# Patient Record
Sex: FEMALE | Race: ASIAN | NOT HISPANIC OR LATINO | Employment: OTHER | URBAN - METROPOLITAN AREA
[De-identification: names, ages, dates, MRNs, and addresses within clinical notes are randomized per-mention and may not be internally consistent; named-entity substitution may affect disease eponyms.]

---

## 2018-04-10 ENCOUNTER — TRANSFERRED RECORDS (OUTPATIENT)
Dept: HEALTH INFORMATION MANAGEMENT | Facility: CLINIC | Age: 73
End: 2018-04-10

## 2018-08-04 ENCOUNTER — TRANSFERRED RECORDS (OUTPATIENT)
Dept: HEALTH INFORMATION MANAGEMENT | Facility: CLINIC | Age: 73
End: 2018-08-04

## 2018-09-13 ENCOUNTER — TRANSFERRED RECORDS (OUTPATIENT)
Dept: HEALTH INFORMATION MANAGEMENT | Facility: CLINIC | Age: 73
End: 2018-09-13

## 2018-11-14 LAB — MAMMOGRAM: NORMAL

## 2018-11-28 ENCOUNTER — TRANSFERRED RECORDS (OUTPATIENT)
Dept: HEALTH INFORMATION MANAGEMENT | Facility: CLINIC | Age: 73
End: 2018-11-28

## 2018-12-10 ENCOUNTER — TRANSFERRED RECORDS (OUTPATIENT)
Dept: HEALTH INFORMATION MANAGEMENT | Facility: CLINIC | Age: 73
End: 2018-12-10

## 2018-12-10 LAB — TSH SERPL-ACNC: 1.39 UIU/ML (ref 0.35–4.94)

## 2019-03-11 ENCOUNTER — DOCUMENTATION ONLY (OUTPATIENT)
Dept: CARE COORDINATION | Facility: CLINIC | Age: 74
End: 2019-03-11

## 2019-03-29 ENCOUNTER — OFFICE VISIT (OUTPATIENT)
Dept: DERMATOLOGY | Facility: CLINIC | Age: 74
End: 2019-03-29
Payer: COMMERCIAL

## 2019-03-29 VITALS — SYSTOLIC BLOOD PRESSURE: 145 MMHG | HEART RATE: 72 BPM | DIASTOLIC BLOOD PRESSURE: 78 MMHG

## 2019-03-29 DIAGNOSIS — L65.0 TELOGEN EFFLUVIUM: ICD-10-CM

## 2019-03-29 DIAGNOSIS — L64.9 ANDROGENETIC ALOPECIA: Primary | ICD-10-CM

## 2019-03-29 ASSESSMENT — PAIN SCALES - GENERAL: PAINLEVEL: NO PAIN (0)

## 2019-03-29 NOTE — LETTER
3/29/2019       RE: Leonardo De La Rosa  [address] [facility] [address]     Dear Colleague,    Thank you for referring your patient, Leonardo De La Rosa, to the [facility] DERMATOLOGY at [facility]. Please see a copy of my visit note below.    [facility] Dermatology Note      Dermatology Problem List:  1. 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 Cazares in 12/2018; report requested  -topical minoxidil 5% foam    2. Skin dyspigmentation, central forehead and chin  -strict photoprotection  -has hydroquinone at home, future: Triluma    Encounter Date: Mar 29, 2019    CC:   Chief Complaint   Patient presents with     Derm Problem     Leonardo is here for hairloss in the last 6 months and pigmentation on her face.      History of Present Illness:  Ms. Leonardo De La Rosa is a 73 year old female who presents in self referral for hair loss. She states she noticed rapid and diffuse loss of hair over the crown of the scalp (no where else on her body) ~6 months ago. She denies any recent stressors (3-6 months prior to shedding) including major life events, surgeries, anesthesia, rapid weight loss. She also has not noticed any symptoms on the scalp including burning, pain, or dysesthesia or redness or significant scale. The patient denies a personal history of Sjogren's syndrome, systemic or cutaneous lupus erythematosus, rheumatoid arthritis, vitiligo, inflammatory bowel disease, thyroid disease, dermatomyositis, and celiac disease.     The patient saw Dr. Cazares in 12/2018 and had labs and  a biopsy performed.  The following labs were unremarkable in 12/2018: vitamin B12/folate, ferritin, zinc, TSH/free T4, vitamin D, EVA, DHEA-S, testosterone, androstenedione, and SHBG. Unfortunately, we do not have copy of the office visit or results of the biopsy. Mediation review is significant for starting venlafaxine last spring and notes her hair loss may have started around this time.     For the dyspigmentation of the central forehead, she states she has had this for several years. Always in fixed areas (she notices this in the central forehead and chin). She wears sunscreen daily. She did notice this around the time she was taking oral contraceptives. She has not noted other areas of involvement. She does have some topical hydroquinone that she is not currently using, but it has helped in the past.       Past Medical History:   Patient Active Problem List   Diagnosis     Pain     Postsurgical arthrodesis status     Past Medical History:   Diagnosis Date     Arthritis     Feet Hands and back.     PONV (postoperative nausea and vomiting)     Severe     Past Surgical History:   Procedure Laterality Date     ARTHRODESIS FOOT  4/22/2013    Procedure: ARTHRODESIS FOOT;  Left Second and Third Tarsal metatarsal Arthrodesis, Bunion Correction, Lapidus Procedure, Weil Osteotomy   ;  Surgeon: Alber Pace MD;  Location: [facility] OR     BUNIONECTOMY LAPIDUS WITH TARSAL METATARSAL (TMT) FUSION  4/22/2013    Procedure: BUNIONECTOMY LAPIDUS WITH TARSAL METATARSAL (TMT) FUSION;;  Surgeon: Alber Pace MD;  Location: [facility] OR     C TOTAL KNEE ARTHROPLASTY      left     FOOT SURGERY      right bunionectomy.     HYSTERECTOMY TOTAL ABDOMINAL, BILATERAL SALPINGO-OOPHORECTOMY, COMBINED         Social History:  Patient does not have a smoking history on file. she has never used smokeless tobacco. She reports that she does not drink alcohol or use drugs.    Family History:  History reviewed. No pertinent family  history.    Medications:  Current Outpatient Medications   Medication Sig Dispense Refill     acetaminophen (TYLENOL) 325 MG tablet Take 3 tablets by mouth every 8 hours. 100 tablet 0     ASPIRIN EC PO Take 81 mg by mouth daily.       Atorvastatin Calcium (LIPITOR PO) Take 10 mg by mouth daily.       calcium carbonate-vitamin D (CALCIUM + D) 600-200 MG-UNIT TABS Take 1 tablet by mouth daily.       Celecoxib (CELEBREX PO) Take 200 mg by mouth daily.       Cholecalciferol (VITAMIN D3 PO) Take 1,000 Units by mouth daily.       glucosamine-chondroitin 500-400 MG CAPS Take 1 capsule by mouth daily.       HYDROmorphone (DILAUDID) 2 MG tablet Take 1 tablet by mouth every 3 hours as needed. 40 tablet 0     hydrOXYzine (ATARAX) 10 MG tablet Take 1-2 tablets by mouth every 6 hours. 50 tablet 1     multivitamin, therapeutic with minerals (MULTI-VITAMIN) TABS Take 1 tablet by mouth daily.       Omega-3 Fatty Acids (OMEGA-3 FISH OIL PO) Take 1,200 g by mouth daily.       ondansetron (ZOFRAN-ODT) 4 MG disintegrating tablet Take 1 tablet by mouth every 6 hours as needed for nausea. 30 tablet 1     senna-docusate (SENOKOT-S;PERICOLACE) 8.6-50 MG per tablet Take 1-2 tablets by mouth 2 times daily. 14 tablet 0        Allergies   Allergen Reactions     Reglan      Review of Systems:  -As per HPI  -Constitutional: Otherwise feeling well today, in usual state of health.  -Skin: As above in HPI. No additional skin concerns.    Physical exam:  Vitals: /78   Pulse 72   GEN: This is a well developed, well-nourished female in no acute distress, in a pleasant mood.    SKIN: Examined face, scalp, neck, upper chest, upper back, arms, hands including nails/digits  -decreased density of follicles, primarily along the crown of the scalp with some miniaturization  -no macular erythema, perifollicular erythema  -subtle perifollicular accentuation   -there is no loss of follicular ostia in areas of hair loss  -normal density of eyebrows and  eyelashes  -poorly demarcated hyperpigmentation over central forehead  -No other lesions of concern on areas examined.     Impression/Plan:  1. Hair loss, appears nonscarring on clinical/trichoscopic exam with some miniaturization of follicles that is not prominent. Given the acute onset of hair loss, we would favor telogen effluvium, but no clear inciting factors. Of note, patient started venlafaxine shortly before hair loss started; this medication has been associated with hair loss, though this is not a common side effect. Recommend patient discuss an alternative antidepressant/antineuropathic agent with PCP in case this is contributory. For component of androgenetic alopecia, will start minoxidil 5% foam. Will obtain recent biopsy report and labs as well.    Recommended starting topical 5% minoxidil foam    Will have the patient sign a release of information to obtain biopsy results +/- office visit with Dr. Cazares    2. Dyspigmentation of central forehead: quite subtle on exam today, possibly consistent with melasma vs lichen planus pigmentosus, partially treated with hydroquinone. Discussed treatment options of topical agents, including risks/benefits, and recommend strict photoprotection and hydroquinone.    Can consider topical Triluma in the future if recalcitrant.     Follow-up in 2 months, earlier for new or changing lesions.     Dr. Zimmerman staffed the patient.    Staff Involved:  Resident(Amira Jung)/Staff    Staff Physician Comments:   I saw and evaluated the patient with the resident and I edited the assessment and plan as documented in the note. I was present for the examination.    Yves Zimmerman MD

## 2019-03-29 NOTE — PROGRESS NOTES
[facility] Dermatology Note      Dermatology Problem List:  1. 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 Cazares in 12/2018; report requested  -topical minoxidil 5% foam    2. Skin dyspigmentation, central forehead and chin  -strict photoprotection  -has hydroquinone at home, future: Triluma    Encounter Date: Mar 29, 2019    CC:   Chief Complaint   Patient presents with     Derm Problem     Leonardo is here for hairloss in the last 6 months and pigmentation on her face.      History of Present Illness:  Ms. Leonardo De La Rosa is a 73 year old female who presents in self referral for hair loss. She states she noticed rapid and diffuse loss of hair over the crown of the scalp (no where else on her body) ~6 months ago. She denies any recent stressors (3-6 months prior to shedding) including major life events, surgeries, anesthesia, rapid weight loss. She also has not noticed any symptoms on the scalp including burning, pain, or dysesthesia or redness or significant scale. The patient denies a personal history of Sjogren's syndrome, systemic or cutaneous lupus erythematosus, rheumatoid arthritis, vitiligo, inflammatory bowel disease, thyroid disease, dermatomyositis, and celiac disease.     The patient saw Dr. Cazares in 12/2018 and had labs and a biopsy performed.  The following labs were unremarkable in 12/2018: vitamin B12/folate, ferritin, zinc, TSH/free T4, vitamin D, EVA, DHEA-S, testosterone, androstenedione, and SHBG. Unfortunately, we do not have copy of the office visit or results of the biopsy. Mediation  review is significant for starting venlafaxine last spring and notes her hair loss may have started around this time.     For the dyspigmentation of the central forehead, she states she has had this for several years. Always in fixed areas (she notices this in the central forehead and chin). She wears sunscreen daily. She did notice this around the time she was taking oral contraceptives. She has not noted other areas of involvement. She does have some topical hydroquinone that she is not currently using, but it has helped in the past.       Past Medical History:   Patient Active Problem List   Diagnosis     Pain     Postsurgical arthrodesis status     Past Medical History:   Diagnosis Date     Arthritis     Feet Hands and back.     PONV (postoperative nausea and vomiting)     Severe     Past Surgical History:   Procedure Laterality Date     ARTHRODESIS FOOT  4/22/2013    Procedure: ARTHRODESIS FOOT;  Left Second and Third Tarsal metatarsal Arthrodesis, Bunion Correction, Lapidus Procedure, Weil Osteotomy   ;  Surgeon: Alber Pace MD;  Location: [facility] OR     BUNIONECTOMY LAPIDUS WITH TARSAL METATARSAL (TMT) FUSION  4/22/2013    Procedure: BUNIONECTOMY LAPIDUS WITH TARSAL METATARSAL (TMT) FUSION;;  Surgeon: Alber Pace MD;  Location: [facility] OR     C TOTAL KNEE ARTHROPLASTY      left     FOOT SURGERY      right bunionectomy.     HYSTERECTOMY TOTAL ABDOMINAL, BILATERAL SALPINGO-OOPHORECTOMY, COMBINED         Social History:  Patient does not have a smoking history on file. she has never used smokeless tobacco. She reports that she does not drink alcohol or use drugs.    Family History:  History reviewed. No pertinent family history.    Medications:  Current Outpatient Medications   Medication Sig Dispense Refill     acetaminophen (TYLENOL) 325 MG tablet Take 3 tablets by mouth every 8 hours. 100 tablet 0     ASPIRIN EC PO Take 81 mg by mouth daily.       Atorvastatin Calcium (LIPITOR PO) Take 10 mg by  mouth daily.       calcium carbonate-vitamin D (CALCIUM + D) 600-200 MG-UNIT TABS Take 1 tablet by mouth daily.       Celecoxib (CELEBREX PO) Take 200 mg by mouth daily.       Cholecalciferol (VITAMIN D3 PO) Take 1,000 Units by mouth daily.       glucosamine-chondroitin 500-400 MG CAPS Take 1 capsule by mouth daily.       HYDROmorphone (DILAUDID) 2 MG tablet Take 1 tablet by mouth every 3 hours as needed. 40 tablet 0     hydrOXYzine (ATARAX) 10 MG tablet Take 1-2 tablets by mouth every 6 hours. 50 tablet 1     multivitamin, therapeutic with minerals (MULTI-VITAMIN) TABS Take 1 tablet by mouth daily.       Omega-3 Fatty Acids (OMEGA-3 FISH OIL PO) Take 1,200 g by mouth daily.       ondansetron (ZOFRAN-ODT) 4 MG disintegrating tablet Take 1 tablet by mouth every 6 hours as needed for nausea. 30 tablet 1     senna-docusate (SENOKOT-S;PERICOLACE) 8.6-50 MG per tablet Take 1-2 tablets by mouth 2 times daily. 14 tablet 0        Allergies   Allergen Reactions     Reglan      Review of Systems:  -As per HPI  -Constitutional: Otherwise feeling well today, in usual state of health.  -Skin: As above in HPI. No additional skin concerns.    Physical exam:  Vitals: /78   Pulse 72   GEN: This is a well developed, well-nourished female in no acute distress, in a pleasant mood.    SKIN: Examined face, scalp, neck, upper chest, upper back, arms, hands including nails/digits  -decreased density of follicles, primarily along the crown of the scalp with some miniaturization  -no macular erythema, perifollicular erythema  -subtle perifollicular accentuation   -there is no loss of follicular ostia in areas of hair loss  -normal density of eyebrows and eyelashes  -poorly demarcated hyperpigmentation over central forehead  -No other lesions of concern on areas examined.     Impression/Plan:  1. Hair loss, appears nonscarring on clinical/trichoscopic exam with some miniaturization of follicles that is not prominent. Given the acute  onset of hair loss, we would favor telogen effluvium, but no clear inciting factors. Of note, patient started venlafaxine shortly before hair loss started; this medication has been associated with hair loss, though this is not a common side effect. Recommend patient discuss an alternative antidepressant/antineuropathic agent with PCP in case this is contributory. For component of androgenetic alopecia, will start minoxidil 5% foam. Will obtain recent biopsy report and labs as well.    Recommended starting topical 5% minoxidil foam    Will have the patient sign a release of information to obtain biopsy results +/- office visit with Dr. Cazares    2. Dyspigmentation of central forehead: quite subtle on exam today, possibly consistent with melasma vs lichen planus pigmentosus, partially treated with hydroquinone. Discussed treatment options of topical agents, including risks/benefits, and recommend strict photoprotection and hydroquinone.    Can consider topical Triluma in the future if recalcitrant.     Follow-up in 2 months, earlier for new or changing lesions.     Dr. Zimmerman staffed the patient.    Staff Involved:  Resident(Amira Jung)/Staff    Staff Physician Comments:   I saw and evaluated the patient with the resident and I edited the assessment and plan as documented in the note. I was present for the examination.    Yves Zimmerman MD   of Dermatology  Department of Dermatology  [facility]

## 2019-03-29 NOTE — NURSING NOTE
Dermatology Rooming Note    Leonardo K Rj's goals for this visit include:   Chief Complaint   Patient presents with     Derm Problem     Leonardo is here for hairloss in the last 6 months and pigmentation on her face.      Jodi Guevara LPN

## 2019-04-03 ENCOUNTER — OFFICE VISIT (OUTPATIENT)
Dept: OTOLARYNGOLOGY | Facility: CLINIC | Age: 74
End: 2019-04-03
Payer: MEDICARE

## 2019-04-03 VITALS — SYSTOLIC BLOOD PRESSURE: 160 MMHG | DIASTOLIC BLOOD PRESSURE: 85 MMHG | HEART RATE: 75 BPM | OXYGEN SATURATION: 100 %

## 2019-04-03 DIAGNOSIS — J31.0 CHRONIC RHINITIS: Primary | ICD-10-CM

## 2019-04-03 DIAGNOSIS — R05.3 CHRONIC COUGH: ICD-10-CM

## 2019-04-03 DIAGNOSIS — J38.7 IRRITABLE LARYNX: ICD-10-CM

## 2019-04-03 PROCEDURE — 31575 DIAGNOSTIC LARYNGOSCOPY: CPT | Performed by: OTOLARYNGOLOGY

## 2019-04-03 PROCEDURE — 99203 OFFICE O/P NEW LOW 30 MIN: CPT | Mod: 25 | Performed by: OTOLARYNGOLOGY

## 2019-04-03 RX ORDER — VENLAFAXINE HYDROCHLORIDE 150 MG/1
150 CAPSULE, EXTENDED RELEASE ORAL DAILY
COMMUNITY
End: 2019-06-19

## 2019-04-03 ASSESSMENT — PAIN SCALES - GENERAL: PAINLEVEL: MODERATE PAIN (5)

## 2019-04-03 NOTE — PATIENT INSTRUCTIONS
Humidifier and possibly air filter in bedroom.    Use saline nasal spray frequently 2-8 times a day    Speech therapy for cough suppresion and muslce tension dysphonia    Continu anti reflux medications  GERD (Gastro Esophageal Reflux Disorder)  Other names    reflux     Laryngopharyngeal Reflux Disorder (LPRD)   Symptoms    dry, choking sensation, especially during the night     voice quality that is worst in the morning     raw, burning sensation in the throat     pain in throat, neck, or running from back of chin along neck     frequent coughing or desire to clear throat     rough, gritty voice quality     decline in voice quality or comfort with continued voice use     a sour taste in your mouth upon waking up       Interestingly, the majority of the patients in the [facility] who have laryngeal symptoms of GERD do not have any stomach discomfort or sensation of heartburn.   Cause  Acid from the stomach refluxes back up through the esophagus and spills over onto the larynx. This irritates the vocal folds (also called vocal cords; see the explanation of this terminology) and creates inflammation, which causes the vocal folds to vibrate unevenly. Coughing and throat clearing from the irritation can make the inflammation worse. The resulting voice disorder is often related to the poor vibratory quality of the inflamed vocal folds and the muscle tension created by effortful attempts at compensation.  Treatment  Anti-reflux medication and dietary precautions are the first line of defense. Functional voice therapy is useful to teach techniques for reducing effortful compensation and instruct the individual in improved vocal hygiene.  At the [facility], we do not hand out a list of foods and beverages that must be avoided. Rather, we educate about types of foods and beverages known to cause reflux and encourage the individual to systematically investigate which foods stimulate their own reflux. Also,  the individual is encouraged to manage reflux under the care of a Gastroenterologist (gastrointestinal specialist).  Interested readers are encouraged to look at the website of the [facility] at [facility] for a more in depth discussion of GERD and the voice (see our Links page).  Lifestyle changes that may help reduce symptoms of GERD/LPRD    eat smaller meals more frequently throughout the day, rather than three large meals     elevate the head of your bed 2-3 inches (don't just use extra pillows for your head)     avoid clothes that fit tightly around the waist     avoid lying down within 2-3 hours of eating (don't eat dinner late at night)   Types of foods known to trigger increased stomach acid    spicy food (such as chili or jalapeño peppers, Azeri or Szechuan spices)     acidic foods such as tomato products or citrus products     greasy foods     caffeine     alcohol     carbonation     roughage, such as popcorn and peanuts, or raw vegetables     dairy products     strong mint such as peppermint candies     chocolate     Reading this list might make you think you can only eat bread and oatmeal for the rest of your life. Fortunately, most individuals are not triggered by everything on this list. For example, for every person who is lactose intolerant and has problems with dairy products, there may be someone else whose digestive system is calmed by milk. Also, in our experience, few persons are triggered by peppermints or chocolate. Therefore, we recommend that you experiment with your own dietary habits, changing one food class at a time. Also, if you have recently started taking anti-reflux medications, you may want to wait for several months, to see how the medication works without any change in your dietary habits.

## 2019-04-03 NOTE — LETTER
4/3/2019         RE: Leonardo De La oRsa  [address] [facility] [address]        Dear Colleague,    Thank you for referring your patient, Leonardo De La Rosa, to the [facility]. Please see a copy of my visit note below.      HISTORY OF PRESENT ILLNESS: Leonardo De La Rosa is a 73 year old female with a history of chronic cough for the last 6 months.  On ENT examination and Staci she was found to have posterior commissure hypertrophy and otherwise normal laryngeal exam.  She had been placed on omeprazole 40 mg a day.  She was switched in September to Protonix 40 mg every day for 3 months.  Lifestyle modification modifications were also encouraged.  She was also treated for postnasal drainage as that could be contributing to her cough.  A sinus rinse twice a day along with continuing Flonase was also recommended.  She also has a history of right frontal sinusitis and bilateral sphenoid sinusitis with sinus ossification.  It was felt by the prior ENT that this was unlikely that her cough was due to this.  Speech therapy was considered for chronic cough as well.  She has tried antibiotics for her sinusitis which have not been effective for her.    She had a history of sinus surgery approximately 30 years ago per her report.  Findings on a CT scan report on 9/13/2018 o showed chronic appearing opacification within the right frontal sinus with a nuchal.  Ostial reaction and internal calcification in the right frontal sinus which is unchanged since August 2018.  I was able to see a copy of the scan it looks more like a hypoplastic right frontal sinus with a possible bony cyst or space.  There was also chronic opacification within the sphenoid sinuses bilaterally with bulky internal calcifications and mucosal periosteal reaction which are unchanged.  There is mild mucosal thickening within the right maxillary sinus.  The left frontal, ethmoid and maxillary sinuses are clear without evidence of mucoperiosteal  reaction.      Treatment for her chronic cough has also included a trial of antibiotics for which she was given doxycycline as she was intolerant of Augmentin due to diarrhea.  Unfortunately this did not help her cough.  She notes her cough occurs mostly in the evening when she lies down and can result in a coughing fit.      PAST MEDICAL HISTORY:   Past Medical History:   Diagnosis Date     Arthritis     Feet Hands and back.     PONV (postoperative nausea and vomiting)     Severe       PAST SURGICAL HISTORY:   Past Surgical History:   Procedure Laterality Date     ARTHRODESIS FOOT  2013    Procedure: ARTHRODESIS FOOT;  Left Second and Third Tarsal metatarsal Arthrodesis, Bunion Correction, Lapidus Procedure, Weil Osteotomy   ;  Surgeon: Alber Pace MD;  Location: [facility] OR     BUNIONECTOMY LAPIDUS WITH TARSAL METATARSAL (TMT) FUSION  2013    Procedure: BUNIONECTOMY LAPIDUS WITH TARSAL METATARSAL (TMT) FUSION;;  Surgeon: Alber Pace MD;  Location: [facility] OR     C TOTAL KNEE ARTHROPLASTY      left     FOOT SURGERY      right bunionectomy.     HYSTERECTOMY TOTAL ABDOMINAL, BILATERAL SALPINGO-OOPHORECTOMY, COMBINED       Family History      Medical History Relation Name Comments   Heart Disease Mother   cad     Relation Name Status Comments   Mother           SOCIAL HISTORY:   Social History     Tobacco Use     Smoking status: Not on file     Smokeless tobacco: Never Used   Substance Use Topics     Alcohol use: No       REVIEW OF SYSTEMS: Ten point review of systems was performed and is negative except for what is noted in the HPI and PMH.     ALLERGIES: Reglan    MEDICATIONS:   Current Outpatient Medications   Medication Sig Dispense Refill     acetaminophen (TYLENOL) 325 MG tablet Take 3 tablets by mouth every 8 hours. 100 tablet 0     ASPIRIN EC PO Take 81 mg by mouth daily.       Atorvastatin Calcium (LIPITOR PO) Take 10 mg by mouth daily.       calcium carbonate-vitamin D (CALCIUM  + D) 600-200 MG-UNIT TABS Take 1 tablet by mouth daily.       Celecoxib (CELEBREX PO) Take 200 mg by mouth daily.       Cholecalciferol (VITAMIN D3 PO) Take 1,000 Units by mouth daily.       glucosamine-chondroitin 500-400 MG CAPS Take 1 capsule by mouth daily.       HYDROmorphone (DILAUDID) 2 MG tablet Take 1 tablet by mouth every 3 hours as needed. 40 tablet 0     hydrOXYzine (ATARAX) 10 MG tablet Take 1-2 tablets by mouth every 6 hours. 50 tablet 1     multivitamin, therapeutic with minerals (MULTI-VITAMIN) TABS Take 1 tablet by mouth daily.       Omega-3 Fatty Acids (OMEGA-3 FISH OIL PO) Take 1,200 g by mouth daily.       ondansetron (ZOFRAN-ODT) 4 MG disintegrating tablet Take 1 tablet by mouth every 6 hours as needed for nausea. 30 tablet 1     senna-docusate (SENOKOT-S;PERICOLACE) 8.6-50 MG per tablet Take 1-2 tablets by mouth 2 times daily. 14 tablet 0       PHYSICAL EXAMINATION:  She  is awake, alert and in no apparent distress.    Her tympanic membranes are clear and intact bilaterally. External auditory canals are clear.  Nasal exam shows a mild septal deviation without obstruction.  Total septal perforation present.  The anterior aspect of the middle turbinate on the right side is largely gone.  Examination of the oral cavity shows no suspicious lesions.  There is symmetric movement of the tongue and soft palate.    The oropharynx is clear.  Her neck is supple without significant adenopathy.  Pulse is regular.  Upper airway is clear.  Cranial nerves II-XII are grossly intact.       PROCEDURE: A flexible laryngoscopy and nasal endoscopy  was performed.  Informed consent was obtained and a time out was performed. 3% lidocaine and 0.25% phenylephrine was sprayed into the nasal cavity and allowed 3 to 5 minutes for effect.  The scope was initially passed through the right nostril.  Extensive surgical changes are seen.  The majority of the inferior turbinate is absent although a posterior remnant is still in  place.  The ethmoid cells are largely open.  A sphenoidotomy is seen.  I was able to look superiorly at what appears to be the  the frontal sinus.  A septal perforation is seen.  On the left side the turbinates are intact.  No surgical changes are present on examination no intranasal polyps or masses are seen.  The airway is much narrower.     Examination of the hypopharynx showed the vocal folds to be mobile and meet in the midline.  No nodules, polyps or ulcerations are seen.  There is mild posterior commissure hypertrophy.  On connected speech there is contracture of the supraglottic larynx in the anterior posterior dimension.  Complete closure of the epiglottic arytenoid space is not seen.  The immediate subglottic area is clear as is the hypopharynx.           IMPRESSION/PLAN: She has irritable larynx with an associated chronic cough.  She does have extensive surgical changes of the nasal cavity resulting in a decrease of humidification and filtering effect.    I am recommending a humidifier for her bedroom as well as an air filter for her bedroom.  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  I want to continue her antireflux medications and did give her her an anti-reflux diet.  I advised her this will not immediately resolve her cough but hopefully if we can make her larynx less irritable this will help.  Because of the decreased filtering available for the right nostril this may be more prolonged process that we might otherwise see.  She does have some changes in her sinus particularly in the sphenoid sinuses and if this is not effective we might consider sphenoidotomies and clearing of the sphenoid sinuses on either side from possible fungal debris or osteoma.  For this I would consider consultation with  my rhinology colleagues for their evaluation.        Again, thank you for allowing me to participate in the care of your patient.         Sincerely,        Jeff Lyons MD

## 2019-04-03 NOTE — NURSING NOTE
Leonardo De La Rosa's goals for this visit include:   Chief Complaint   Patient presents with     Consult     Cough which is causing pt to loose sleep. Voice getting hoarse       She requests these members of her care team be copied on today's visit information: PCP    PCP: Nikolas Gudino    Referring Provider:  No referring provider defined for this encounter.    /85 (BP Location: Right arm, Patient Position: Chair, Cuff Size: Adult Regular)   Pulse 75   SpO2 100%     Do you need any medication refills at today's visit? None      Caren Barrientos, CMA

## 2019-04-03 NOTE — PROGRESS NOTES
HISTORY OF PRESENT ILLNESS: Leonardo De La Rosa is a 73 year old female with a history of chronic cough for the last 6 months.  On ENT examination and Staci she was found to have posterior commissure hypertrophy and otherwise normal laryngeal exam.  She had been placed on omeprazole 40 mg a day.  She was switched in September to Protonix 40 mg every day for 3 months.  Lifestyle modification modifications were also encouraged.  She was also treated for postnasal drainage as that could be contributing to her cough.  A sinus rinse twice a day along with continuing Flonase was also recommended.  She also has a history of right frontal sinusitis and bilateral sphenoid sinusitis with sinus ossification.  It was felt by the prior ENT that this was unlikely that her cough was due to this.  Speech therapy was considered for chronic cough as well.  She has tried antibiotics for her sinusitis which have not been effective for her.    She had a history of sinus surgery approximately 30 years ago per her report.  Findings on a CT scan report on 9/13/2018 o showed chronic appearing opacification within the right frontal sinus with a nuchal.  Ostial reaction and internal calcification in the right frontal sinus which is unchanged since August 2018.  I was able to see a copy of the scan it looks more like a hypoplastic right frontal sinus with a possible bony cyst or space.  There was also chronic opacification within the sphenoid sinuses bilaterally with bulky internal calcifications and mucosal periosteal reaction which are unchanged.  There is mild mucosal thickening within the right maxillary sinus.  The left frontal, ethmoid and maxillary sinuses are clear without evidence of mucoperiosteal reaction.      Treatment for her chronic cough has also included a trial of antibiotics for which she was given doxycycline as she was intolerant of Augmentin due to diarrhea.  Unfortunately this did not help her cough.  She notes her cough  occurs mostly in the evening when she lies down and can result in a coughing fit.      PAST MEDICAL HISTORY:   Past Medical History:   Diagnosis Date     Arthritis     Feet Hands and back.     PONV (postoperative nausea and vomiting)     Severe       PAST SURGICAL HISTORY:   Past Surgical History:   Procedure Laterality Date     ARTHRODESIS FOOT  2013    Procedure: ARTHRODESIS FOOT;  Left Second and Third Tarsal metatarsal Arthrodesis, Bunion Correction, Lapidus Procedure, Weil Osteotomy   ;  Surgeon: Alber Pace MD;  Location: [facility] OR     BUNIONECTOMY LAPIDUS WITH TARSAL METATARSAL (TMT) FUSION  2013    Procedure: BUNIONECTOMY LAPIDUS WITH TARSAL METATARSAL (TMT) FUSION;;  Surgeon: Alber Pace MD;  Location: [facility] OR     C TOTAL KNEE ARTHROPLASTY      left     FOOT SURGERY      right bunionectomy.     HYSTERECTOMY TOTAL ABDOMINAL, BILATERAL SALPINGO-OOPHORECTOMY, COMBINED       Family History      Medical History Relation Name Comments   Heart Disease Mother   cad     Relation Name Status Comments   Mother           SOCIAL HISTORY:   Social History     Tobacco Use     Smoking status: Not on file     Smokeless tobacco: Never Used   Substance Use Topics     Alcohol use: No       REVIEW OF SYSTEMS: Ten point review of systems was performed and is negative except for what is noted in the HPI and PMH.     ALLERGIES: Reglan    MEDICATIONS:   Current Outpatient Medications   Medication Sig Dispense Refill     acetaminophen (TYLENOL) 325 MG tablet Take 3 tablets by mouth every 8 hours. 100 tablet 0     ASPIRIN EC PO Take 81 mg by mouth daily.       Atorvastatin Calcium (LIPITOR PO) Take 10 mg by mouth daily.       calcium carbonate-vitamin D (CALCIUM + D) 600-200 MG-UNIT TABS Take 1 tablet by mouth daily.       Celecoxib (CELEBREX PO) Take 200 mg by mouth daily.       Cholecalciferol (VITAMIN D3 PO) Take 1,000 Units by mouth daily.       glucosamine-chondroitin 500-400 MG CAPS Take 1  capsule by mouth daily.       HYDROmorphone (DILAUDID) 2 MG tablet Take 1 tablet by mouth every 3 hours as needed. 40 tablet 0     hydrOXYzine (ATARAX) 10 MG tablet Take 1-2 tablets by mouth every 6 hours. 50 tablet 1     multivitamin, therapeutic with minerals (MULTI-VITAMIN) TABS Take 1 tablet by mouth daily.       Omega-3 Fatty Acids (OMEGA-3 FISH OIL PO) Take 1,200 g by mouth daily.       ondansetron (ZOFRAN-ODT) 4 MG disintegrating tablet Take 1 tablet by mouth every 6 hours as needed for nausea. 30 tablet 1     senna-docusate (SENOKOT-S;PERICOLACE) 8.6-50 MG per tablet Take 1-2 tablets by mouth 2 times daily. 14 tablet 0       PHYSICAL EXAMINATION:  She  is awake, alert and in no apparent distress.    Her tympanic membranes are clear and intact bilaterally. External auditory canals are clear.  Nasal exam shows a mild septal deviation without obstruction.  Total septal perforation present.  The anterior aspect of the middle turbinate on the right side is largely gone.  Examination of the oral cavity shows no suspicious lesions.  There is symmetric movement of the tongue and soft palate.    The oropharynx is clear.  Her neck is supple without significant adenopathy.  Pulse is regular.  Upper airway is clear.  Cranial nerves II-XII are grossly intact.       PROCEDURE: A flexible laryngoscopy and nasal endoscopy  was performed.  Informed consent was obtained and a time out was performed. 3% lidocaine and 0.25% phenylephrine was sprayed into the nasal cavity and allowed 3 to 5 minutes for effect.  The scope was initially passed through the right nostril.  Extensive surgical changes are seen.  The majority of the inferior turbinate is absent although a posterior remnant is still in place.  The ethmoid cells are largely open.  A sphenoidotomy is seen.  I was able to look superiorly at what appears to be the  the frontal sinus.  A septal perforation is seen.  On the left side the turbinates are intact.  No surgical  changes are present on examination no intranasal polyps or masses are seen.  The airway is much narrower.     Examination of the hypopharynx showed the vocal folds to be mobile and meet in the midline.  No nodules, polyps or ulcerations are seen.  There is mild posterior commissure hypertrophy.  On connected speech there is contracture of the supraglottic larynx in the anterior posterior dimension.  Complete closure of the epiglottic arytenoid space is not seen.  The immediate subglottic area is clear as is the hypopharynx.           IMPRESSION/PLAN: She has irritable larynx with an associated chronic cough.  She does have extensive surgical changes of the nasal cavity resulting in a decrease of humidification and filtering effect.    I am recommending a humidifier for her bedroom as well as an air filter for her bedroom.  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  I want to continue her antireflux medications and did give her her an anti-reflux diet.  I advised her this will not immediately resolve her cough but hopefully if we can make her larynx less irritable this will help.  Because of the decreased filtering available for the right nostril this may be more prolonged process that we might otherwise see.  She does have some changes in her sinus particularly in the sphenoid sinuses and if this is not effective we might consider sphenoidotomies and clearing of the sphenoid sinuses on either side from possible fungal debris or osteoma.  For this I would consider consultation with  my rhinology colleagues for their evaluation.

## 2019-04-05 PROBLEM — J31.0 CHRONIC RHINITIS: Status: ACTIVE | Noted: 2019-04-05

## 2019-04-09 ENCOUNTER — TELEPHONE (OUTPATIENT)
Dept: OTOLARYNGOLOGY | Facility: CLINIC | Age: 74
End: 2019-04-09

## 2019-04-09 NOTE — TELEPHONE ENCOUNTER
[facility] Call Center    Phone Message    May a detailed message be left on voicemail: yes    Reason for Call: Other: patient's daughter is calling to request patient get an appointment tomorrow because the mucus she has is causing a lot of discomfort to her.  She states patient spoke to Dr. Lyons's nurse earlier today and she was offered an appointment on 4/17 but patient is hoping to get in tomorrow since she is so uncomfortable.  Please call patient's daughter back to discuss options.       Action Taken: Message routed to:  Adult Clinics: ENT p [id number]

## 2019-04-11 NOTE — TELEPHONE ENCOUNTER
Phone call to pts daughter Lucie.  Spoke with both pt and daughter on speaker. Reviewed pts concerns with Dr Lyons, specifically the cleaning out of the sinuses. Pt states she was not clear on whether this was done in-clinic or if it was a surgery.  Per Dr Lyons, procedure would be a sinus surgery, not an in-clinic procedure.  Pt states that she would first like to complete speech therapy appointment to see if symptoms can be improved this way, and will call back if she would like to pursue the surgical route.  If so, pt understands she would need a CT scan prior, for surgical planning.  Lena Man RN

## 2019-04-25 ENCOUNTER — HOSPITAL ENCOUNTER (OUTPATIENT)
Dept: SPEECH THERAPY | Facility: CLINIC | Age: 74
Setting detail: THERAPIES SERIES
End: 2019-04-25
Attending: OTOLARYNGOLOGY
Payer: MEDICARE

## 2019-04-25 DIAGNOSIS — R05.3 CHRONIC COUGH: ICD-10-CM

## 2019-04-25 DIAGNOSIS — J38.7 IRRITABLE LARYNX: ICD-10-CM

## 2019-04-25 PROCEDURE — 92524 BEHAVRAL QUALIT ANALYS VOICE: CPT | Mod: GN | Performed by: STUDENT IN AN ORGANIZED HEALTH CARE EDUCATION/TRAINING PROGRAM

## 2019-04-25 PROCEDURE — 92507 TX SP LANG VOICE COMM INDIV: CPT | Mod: GN | Performed by: STUDENT IN AN ORGANIZED HEALTH CARE EDUCATION/TRAINING PROGRAM

## 2019-04-25 NOTE — PROGRESS NOTES
[facility]          OUTPATIENT SPEECH LANGUAGE PATHOLOGY VOICE EVALUATION  PLAN OF TREATMENT FOR OUTPATIENT REHABILITATION  (COMPLETE FOR INITIAL CLAIMS ONLY)    Patient's Last Name, First Name, M.I.  YOB: 1945  Leonardo De La Rosa                        Provider s Name: [facility] Medical Record No.  [id number]     Onset Date:  4/3/2019 (order date)    Start of Care Date:  4/25/2019   Type:     ___PT  __OT   _X_SLP    Medical Diagnosis: Irritable larynx syndrome, Chronic cough, Muscle tension dysphonia   Speech Language Pathology Diagnosis:  Irritable larynx syndrome, Chronic cough, Dysphonia    Visits from SOC: 1      _________________________________________________________________________________  Plan of Treatment/Functional Goals:  Voice         Goals     1. Goal Identifier: Cough       Goal Description: Patient will learn, demonstrate, and implement at least 3 cough/throat clear reduction strategies (i.e. hydration/vocal hygiene, alternative cough/throat clear techniques, breathing techniques to arrest cough, management of triggers) in order to report a week of typical activities in which the cough does not exceed a level of 2 out of 10, 90% of the time.       Target Date: 07/24/19   2. Goal Identifier: Throat relaxation       Goal Description: Patient will learn, demonstrate, and implement use of 2-3 voice exercises (Semi-occluded vocal tract, resonant voice, circumlaryngeal massage), given min cues from SLP to promote reduced laryngeal tension and irritation.       Target Date: 07/24/19             Frequency and Duration: 1x/week for 6 weeks with 1-2 monthly follow-ups  Allison E. Alpers, SLP       I CERTIFY THE NEED FOR THESE SERVICES FURNISHED UNDER        THIS PLAN OF TREATMENT AND WHILE UNDER MY CARE     (Physician co-signature of this document indicates review and  certification of the therapy plan).                Certification Date From:  04/25/19  Certification Date To:  07/24/19  Referring Provider: Jeff Lyons MD               Initial Assessment        See Epic Evaluation Start of Care

## 2019-04-25 NOTE — PROGRESS NOTES
Valentina MA SLP Cough and Voice Evaluation  04/25/19 2287       Present No   General Information   Type Of Visit Initial   Start Of Care Date 04/25/19   Referring Physician Jeff Lyons MD  (ENT)   Orders Evaluate And Treat   Orders Comment Chronic cough and muscle tension dysphonia, also need cough suppression therapy   Medical Diagnosis Irritable larynx, Chronic cough   Onset Of Illness/injury Or Date Of Surgery 04/03/19  (order date)   Precautions/Limitations  no known precautions/limitations   Hearing WFL for 1:1 conversation in session   Surgical/Medical history reviewed Yes   Pertinent History Of Current Problem 74yo female presenting with 6-12 month history of chronic coughing.  Cough has been on and off and occurs mostly at night.  Primary triggers include reflux and PND, but pt also reports that talking for an extended period of time can trigger a cough.  Cough tends to be dry, and once she starts coughing she will have a difficult time stopping the cough.  Cough is preceded by a tickle sensation in her throat.  Cough has improved since starting medications and dietary modifications to manage reflux, but is not yet at desired levels.  Sips of water also help to reduce the cough, but not completely.  Pt denies difficulties with hoarseness, swallowing, breathing, or throat pain.  She has tried a variety of home remedies and medical intervention for the cough, including steroids, antibiotics, inhalers, and allergy medications, most of which have not been helpful.  PMH significant for sinus surgery, arthritis.   Prior Level of Functioning No previous problems.   Patient/family Goals To reduce the cough as much as possible   Evaluation Results   Voice Observations COUGH/THROAT CLEAR: Cough triggered after 1-2 minutes of reclining in chair.  Frequent episodes of sharp, dry coughing observed, each lasting about 2-3 seconds in duration.  Rhinorrhea observed with cough.  Locus of  "cough sounds consistent with upper airway and becomes less frequent with use of relaxed breathing techniques and sips of water.  VISIBLE TENSION: neck.  PALPATION OF THYROHYOID REGION: firm musculature with reduced thyrohyoid space and additional closure on phonation.  Mild tenderness reported.   Voice Profile during conversation, 1 min monologue and paragraph reading   Voice Quality Scratchy;Glottal pierre   Voice quality comments SPEECH: Intermittent mild-moderate strain and intermittent mild roughness vs glottal pierre.   Voice quality severity rating continuum (1=Severe, 7=WNL) 5   Breath control Tight   Breath Control comments Excessive thoracic muscle use pattern on inspiration for speech.  Inspirations for speech are shallow.  Pt talks to past end of breath stream with poor respiratory/phonatory coordination.  Pt demonstrating excessive thoracic muscle use pattern with neck and shoulder involvement for cued deep inspiration.   Breath control severity rating continuum (1=Severe, 7=WNL) 5   Voice Use / Effort Hard glottal attacks;Throat push;Contraction of neck muscles;Pinched / squeezed larynx   Voice use / Effort severity rating continuum (1=Severe, 7=WNL) 5   Comments Mild-moderate chronic dry cough and dysphonia characterized by strain, roughness vs glottal pierre, hard glottal onsets, poor respiratory/phonatory coordination, and tight laryngeal musculature during phonation.   Videostroboscopy / Endoscopy   Other observations Laryngoscopy performed by Dr. Lyons on 4/3/2019.  Significant finginds, per Epic report: \"Examination of the hypopharynx showed the vocal folds to be mobile and meet in the midline.  No nodules, polyps or ulcerations are seen.  There is mild posterior commissure hypertrophy.  On connected speech there is contracture of the supraglottic larynx in the anterior posterior dimension.  Complete closure of the epiglottic arytenoid space is not seen.  The immediate subglottic area is clear as is the " "hypopharynx.\"   General Therapy Interventions   Planned Therapy Interventions Voice   Voice Throat clearing elimination plan;Relaxed breath sequence techniques;Breath flow to sound flow;Voice quality/pitch or volume tasks;Resonant voice techniques   Impressions and Recommendations   Communication Diagnosis Irritable larynx syndrome, Chronic cough, Dysphonia   Summary Ms. De La Rosa presents with mild-moderate chronic dry cough and dysphonia characterized by strain, roughness vs glottal pierre, hard glottal onsets, poor respiratory/phonatory coordination, and tight laryngeal musculature during phonation.  Based on today's evaluation and laryngoscopy with ENT, chronic cough is at least partially accounted for by irritable larynx syndrome in the context of laryngopharyngeal reflux and post nasal drainage, and is likely exacerbated by supraglottic hyperfunction.   Recommendations A course of skilled speech therapy is recommended in order to optimize vocal technique, train techniques to reduce the chronic cough, and promote reduced laryngeal tension and irritation that may be contributing to the cough, in order to improve patient's overall quality of life.   Frequency and Duration 1x/week for 6 weeks with 1-2 monthly follow-ups   Prognosis  Good with intervention   Risks and Benefits of Treatment have been explained. Yes   Patient & /or Caregiver  in agreement with plan of care Yes   Patient Education SLP provided education regarding muscle tension dysphonia causes, symptoms, and treatment options.  Therapy initiated today.   Educational Assessment   Barriers to Learning No barriers   Preferred Learning Style Listening;Reading;Demonstration;Pictures / Video   Voice Goals   Voice Goals 1;2   Voice Goal 1   Goal Identifier Cough   Goal Description Patient will learn, demonstrate, and implement at least 3 cough/throat clear reduction strategies (i.e. hydration/vocal hygiene, alternative cough/throat clear techniques, breathing " techniques to arrest cough, management of triggers) in order to report a week of typical activities in which the cough does not exceed a level of 2 out of 10, 90% of the time.   Target Date 07/24/19   Voice Goal 2   Goal Identifier Throat relaxation   Goal Description Patient will learn, demonstrate, and implement use of 2-3 voice exercises (Semi-occluded vocal tract, resonant voice, circumlaryngeal massage), given min cues from SLP to promote reduced laryngeal tension and irritation.   Target Date 07/24/19   Total Session Time   Voice Minutes (43140) 30   Total Evaluation Time 50   Therapy Certification   Certification date from 04/25/19   Certification date to 07/24/19   Medical Diagnosis Irritable larynx syndrome, Chronic cough, Muscle tension dysphonia     Thank you for the referral of this patient.    Allison Alpers, B.A. (tony), M.A., CCC-SLP  Speech-Language Pathologist  Certificate of Vocology  [facility], [place]  [phone]

## 2019-06-19 ENCOUNTER — OFFICE VISIT (OUTPATIENT)
Dept: DERMATOLOGY | Facility: CLINIC | Age: 74
End: 2019-06-19
Payer: COMMERCIAL

## 2019-06-19 VITALS — HEART RATE: 65 BPM | DIASTOLIC BLOOD PRESSURE: 80 MMHG | SYSTOLIC BLOOD PRESSURE: 162 MMHG

## 2019-06-19 DIAGNOSIS — L64.9 ANDROGENETIC ALOPECIA: ICD-10-CM

## 2019-06-19 DIAGNOSIS — L81.9 HYPERPIGMENTATION: ICD-10-CM

## 2019-06-19 DIAGNOSIS — L64.9 ANDROGENETIC ALOPECIA: Primary | ICD-10-CM

## 2019-06-19 LAB
ANION GAP SERPL CALCULATED.3IONS-SCNC: 6 MMOL/L (ref 3–14)
BUN SERPL-MCNC: 14 MG/DL (ref 7–30)
CALCIUM SERPL-MCNC: 9.2 MG/DL (ref 8.5–10.1)
CHLORIDE SERPL-SCNC: 102 MMOL/L (ref 94–109)
CO2 SERPL-SCNC: 25 MMOL/L (ref 20–32)
CREAT SERPL-MCNC: 0.69 MG/DL (ref 0.52–1.04)
GFR SERPL CREATININE-BSD FRML MDRD: 87 ML/MIN/{1.73_M2}
GLUCOSE SERPL-MCNC: 84 MG/DL (ref 70–99)
POTASSIUM SERPL-SCNC: 4.4 MMOL/L (ref 3.4–5.3)
SODIUM SERPL-SCNC: 133 MMOL/L (ref 133–144)

## 2019-06-19 RX ORDER — SPIRONOLACTONE 50 MG/1
100 TABLET, FILM COATED ORAL DAILY
Qty: 60 TABLET | Refills: 3 | Status: SHIPPED | OUTPATIENT
Start: 2019-06-19 | End: 2021-03-04

## 2019-06-19 ASSESSMENT — PAIN SCALES - GENERAL: PAINLEVEL: NO PAIN (0)

## 2019-06-19 NOTE — PROGRESS NOTES
[facility] Dermatology Note      Dermatology Problem List:  1. 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 Cazares in 12/2018; report requested  -topical minoxidil 5% foam started 3/29/19  -spironolactone 100 mg started 6/19/19    2. Hyperpigmentation/melasma, central forehead, left upper lip and chin  -strict photoprotection  -has hydroquinone at home, future: Tri-neville    Encounter Date: Jun 19, 2019    CC:   Chief Complaint   Patient presents with     Derm Problem     2 month hairloss, no changes     History of Present Illness:  Ms. Leonardo De La Rosa is a 73 year old female who presents for follow up for androgenic alopecia. She was last seen in Dermatology clinic on 3/29/19 in which she was started on topical 5% minoxidil foam that she has been using for the past 3 months without any noticed improvement with starting the minoxidil. She reports that the hair loss is diffuse especially on the crown of her scalp. She stopped taking the venlafaxine after the last visit since it could have been contributing to the hair loss. She was previously taking effexor for pain management for chronic low back pain.     She also reports hyperpigmented areas on her forehead, left upper lip and chin that she uses topical hydroquinone as needed. She has not used it in 4 weeks, but does not notice significant improvement with using it. She states that she always uses sun protection. She has previously had facial chemical peels about 8 months that helped.     Past Medical History:   Patient Active Problem List   Diagnosis     Pain      Postsurgical arthrodesis status     Irritable larynx     Chronic cough     Chronic rhinitis     Past Medical History:   Diagnosis Date     Arthritis     Feet Hands and back.     PONV (postoperative nausea and vomiting)     Severe     Past Surgical History:   Procedure Laterality Date     ARTHRODESIS FOOT  4/22/2013    Procedure: ARTHRODESIS FOOT;  Left Second and Third Tarsal metatarsal Arthrodesis, Bunion Correction, Lapidus Procedure, Weil Osteotomy   ;  Surgeon: Alber Pace MD;  Location: [facility] OR     BUNIONECTOMY LAPIDUS WITH TARSAL METATARSAL (TMT) FUSION  4/22/2013    Procedure: BUNIONECTOMY LAPIDUS WITH TARSAL METATARSAL (TMT) FUSION;;  Surgeon: Alber Pace MD;  Location: [facility] OR     C TOTAL KNEE ARTHROPLASTY      left     FOOT SURGERY      right bunionectomy.     HYSTERECTOMY TOTAL ABDOMINAL, BILATERAL SALPINGO-OOPHORECTOMY, COMBINED         Social History:  Patient reports that she has never smoked. She has never used smokeless tobacco. She reports that she does not drink alcohol or use drugs.    Family History:  No family history on file.    Medications:  Current Outpatient Medications   Medication Sig Dispense Refill     acetaminophen (TYLENOL) 325 MG tablet Take 3 tablets by mouth every 8 hours. 100 tablet 0     ASPIRIN EC PO Take 81 mg by mouth daily.       Atorvastatin Calcium (LIPITOR PO) Take 40 mg by mouth daily        calcium carbonate-vitamin D (CALCIUM + D) 600-200 MG-UNIT TABS Take 1 tablet by mouth daily.       Celecoxib (CELEBREX PO) Take 200 mg by mouth daily.       Cholecalciferol (VITAMIN D3 PO) Take 1,000 Units by mouth daily.       glucosamine-chondroitin 500-400 MG CAPS Take 1 capsule by mouth daily.       HYDROmorphone (DILAUDID) 2 MG tablet Take 1 tablet by mouth every 3 hours as needed. 40 tablet 0     hydrOXYzine (ATARAX) 10 MG tablet Take 1-2 tablets by mouth every 6 hours. 50 tablet 1     multivitamin, therapeutic with minerals (MULTI-VITAMIN) TABS Take 1  tablet by mouth daily.       Omega-3 Fatty Acids (OMEGA-3 FISH OIL PO) Take 1,200 g by mouth every 7 days        ondansetron (ZOFRAN-ODT) 4 MG disintegrating tablet Take 1 tablet by mouth every 6 hours as needed for nausea. 30 tablet 1     senna-docusate (SENOKOT-S;PERICOLACE) 8.6-50 MG per tablet Take 1-2 tablets by mouth 2 times daily. 14 tablet 0     venlafaxine (EFFEXOR-XR) 150 MG 24 hr capsule Take 150 mg by mouth daily          Allergies   Allergen Reactions     Reglan      Review of Systems:  -As per HPI  -Constitutional: Otherwise feeling well today, in usual state of health.  -Skin: As above in HPI. No additional skin concerns.    Physical exam:  Vitals: /80 (BP Location: Right arm)   Pulse 65   GEN: This is a well developed, well-nourished female in no acute distress, in a pleasant mood.    SKIN: Focused exam of the scalp and face was completed.   -There is decreased density of follicles, primarily along the crown of the scalp with slight miniaturization by dermoscopy  -no macular erythema, perifollicular erythema  -normal density of eyebrows and eyelashes  -poorly demarcated hyperpigmentation over central forehead, left upper lip and central chin   -No other lesions of concern on areas examined.     Impression/Plan:  1. Androgenetic Alopecia: Stable on minoxidil 5% foam daily that was started 3 months ago. She has also stopped taking venlafaxine since last appointment since it may have been contributing to her hair loss. Discussed risks/benefits of treatment options including spironolactone, finasteride, low level light therapy, and platelet-rich plasma injections.    Continue using topical 5% minoxidil foam    Start taking spironolactone 50mg daily for 1-2 weeks, then increase to 100mg daily if tolerating the medication     Recommended checking BMP monthly for the next 3 months while starting spironolactone     Will attempt to obtain the biopsy results from previous biopsy with Dr. Cazares    2.  Hyperpigmentation of central forehead, left upper lip and chin: Discussed treatment options of topical agents, including risks/benefits, and recommend strict photoprotection and hydroquinone.    Recommended starting Tri-neville daily (provided local script to obtain at cheapest pharmacy and look for coupon on [facility].     Continue with strict sun protection when exposed to the sun      Follow-up in 3 months, earlier for new or changing lesions.     Dr. Zimmerman staffed the patient.    Staff Involved:  Resident(Dinora Kerns DO)/Staff    Staff Physician Comments:   I saw and evaluated the patient with the resident and I edited the assessment and plan as documented in the note. I was present for the examination.    Yves Zimmerman MD   of Dermatology  Department of Dermatology  [facility] [employer]

## 2019-06-19 NOTE — NURSING NOTE
Chief Complaint   Patient presents with     Derm Problem     2 month hairloss, no changes     Candy Shell, EMT

## 2019-06-19 NOTE — LETTER
6/19/2019       RE: Leonardo De La Rosa  [address] [id number]     Dear Colleague,    Thank you for referring your patient, Leonardo De La Rosa, to the [facility] DERMATOLOGY at [facility]. Please see a copy of my visit note below.    [facility] Dermatology Note      Dermatology Problem List:  1. 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 Cazares in 12/2018; report requested  -topical minoxidil 5% foam started 3/29/19  -spironolactone 100 mg started 6/19/19    2. Hyperpigmentation/melasma, central forehead, left upper lip and chin  -strict photoprotection  -has hydroquinone at home, future: Tri-[place]    Encounter Date: Jun 19, 2019    CC:   Chief Complaint   Patient presents with     Derm Problem     2 month hairloss, no changes     History of Present Illness:  Ms. Leonardo De La Rosa is a 73 year old female who presents for follow up for androgenic alopecia. She was last seen in Dermatology clinic on 3/29/19 in which she was started on topical 5% minoxidil foam that she has been using for the past 3 months without any noticed improvement with starting the minoxidil. She reports that the hair loss is diffuse especially on the crown of her scalp. She stopped taking the venlafaxine after the last visit since it could have been contributing to the hair loss. She was previously taking effexor for pain management for chronic low back pain.     She also reports hyperpigmented areas on her forehead, left upper lip and chin that she uses topical hydroquinone as needed. She has not  used it in 4 weeks, but does not notice significant improvement with using it. She states that she always uses sun protection. She has previously had facial chemical peels about 8 months that helped.     Past Medical History:   Patient Active Problem List   Diagnosis     Pain     Postsurgical arthrodesis status     Irritable larynx     Chronic cough     Chronic rhinitis     Past Medical History:   Diagnosis Date     Arthritis     Feet Hands and back.     PONV (postoperative nausea and vomiting)     Severe     Past Surgical History:   Procedure Laterality Date     ARTHRODESIS FOOT  4/22/2013    Procedure: ARTHRODESIS FOOT;  Left Second and Third Tarsal metatarsal Arthrodesis, Bunion Correction, Lapidus Procedure, Weil Osteotomy   ;  Surgeon: Alber Pace MD;  Location: [facility] OR     BUNIONECTOMY LAPIDUS WITH TARSAL METATARSAL (TMT) FUSION  4/22/2013    Procedure: BUNIONECTOMY LAPIDUS WITH TARSAL METATARSAL (TMT) FUSION;;  Surgeon: Alber Pace MD;  Location: [facility] OR     C TOTAL KNEE ARTHROPLASTY      left     FOOT SURGERY      right bunionectomy.     HYSTERECTOMY TOTAL ABDOMINAL, BILATERAL SALPINGO-OOPHORECTOMY, COMBINED         Social History:  Patient reports that she has never smoked. She has never used smokeless tobacco. She reports that she does not drink alcohol or use drugs.    Family History:  No family history on file.    Medications:  Current Outpatient Medications   Medication Sig Dispense Refill     acetaminophen (TYLENOL) 325 MG tablet Take 3 tablets by mouth every 8 hours. 100 tablet 0     ASPIRIN EC PO Take 81 mg by mouth daily.       Atorvastatin Calcium (LIPITOR PO) Take 40 mg by mouth daily        calcium carbonate-vitamin D (CALCIUM + D) 600-200 MG-UNIT TABS Take 1 tablet by mouth daily.       Celecoxib (CELEBREX PO) Take 200 mg by mouth daily.       Cholecalciferol (VITAMIN D3 PO) Take 1,000 Units by mouth daily.       glucosamine-chondroitin 500-400 MG CAPS Take 1 capsule by mouth  daily.       HYDROmorphone (DILAUDID) 2 MG tablet Take 1 tablet by mouth every 3 hours as needed. 40 tablet 0     hydrOXYzine (ATARAX) 10 MG tablet Take 1-2 tablets by mouth every 6 hours. 50 tablet 1     multivitamin, therapeutic with minerals (MULTI-VITAMIN) TABS Take 1 tablet by mouth daily.       Omega-3 Fatty Acids (OMEGA-3 FISH OIL PO) Take 1,200 g by mouth every 7 days        ondansetron (ZOFRAN-ODT) 4 MG disintegrating tablet Take 1 tablet by mouth every 6 hours as needed for nausea. 30 tablet 1     senna-docusate (SENOKOT-S;PERICOLACE) 8.6-50 MG per tablet Take 1-2 tablets by mouth 2 times daily. 14 tablet 0     venlafaxine (EFFEXOR-XR) 150 MG 24 hr capsule Take 150 mg by mouth daily          Allergies   Allergen Reactions     Reglan      Review of Systems:  -As per HPI  -Constitutional: Otherwise feeling well today, in usual state of health.  -Skin: As above in HPI. No additional skin concerns.    Physical exam:  Vitals: /80 (BP Location: Right arm)   Pulse 65   GEN: This is a well developed, well-nourished female in no acute distress, in a pleasant mood.    SKIN: Focused exam of the scalp and face was completed.   -There is decreased density of follicles, primarily along the crown of the scalp with slight miniaturization by dermoscopy  -no macular erythema, perifollicular erythema  -normal density of eyebrows and eyelashes  -poorly demarcated hyperpigmentation over central forehead, left upper lip and central chin   -No other lesions of concern on areas examined.     Impression/Plan:  1. Androgenetic Alopecia: Stable on minoxidil 5% foam daily that was started 3 months ago. She has also stopped taking venlafaxine since last appointment since it may have been contributing to her hair loss. Discussed risks/benefits of treatment options including spironolactone, finasteride, low level light therapy, and platelet-rich plasma injections.    Continue using topical 5% minoxidil foam    Start taking  spironolactone 50mg daily for 1-2 weeks, then increase to 100mg daily if tolerating the medication     Recommended checking BMP monthly for the next 3 months while starting spironolactone     Will attempt to obtain the biopsy results from previous biopsy with Dr. Cazares    2. Hyperpigmentation of central forehead, left upper lip and chin: Discussed treatment options of topical agents, including risks/benefits, and recommend strict photoprotection and hydroquinone.    Recommended starting Tri-neville daily (provided local script to obtain at cheapest pharmacy and look for coupon on [facility].     Continue with strict sun protection when exposed to the sun      Follow-up in 3 months, earlier for new or changing lesions.     Dr. Zimmerman staffed the patient.    Staff Involved:  Resident(Dinora Kerns DO)/Staff    Staff Physician Comments:   I saw and evaluated the patient with the resident and I edited the assessment and plan as documented in the note. I was present for the examination.    Yves Zimmerman MD   of Dermatology  Department of Dermatology  [facility]

## 2019-09-03 ENCOUNTER — DOCUMENTATION ONLY (OUTPATIENT)
Dept: CARE COORDINATION | Facility: CLINIC | Age: 74
End: 2019-09-03

## 2019-10-03 ENCOUNTER — HEALTH MAINTENANCE LETTER (OUTPATIENT)
Age: 74
End: 2019-10-03

## 2019-12-16 ENCOUNTER — HEALTH MAINTENANCE LETTER (OUTPATIENT)
Age: 74
End: 2019-12-16

## 2020-03-26 NOTE — ADDENDUM NOTE
Encounter addended by: Alpers, Allison E, SLP on: 3/26/2020 9:25 AM   Actions taken: Episode resolved

## 2020-05-04 ENCOUNTER — TRANSFERRED RECORDS (OUTPATIENT)
Dept: HEALTH INFORMATION MANAGEMENT | Facility: CLINIC | Age: 75
End: 2020-05-04

## 2020-08-12 ENCOUNTER — RECORDS - HEALTHEAST (OUTPATIENT)
Dept: ADMINISTRATIVE | Facility: OTHER | Age: 75
End: 2020-08-12

## 2020-08-18 ENCOUNTER — HOSPITAL ENCOUNTER (OUTPATIENT)
Dept: PALLIATIVE MEDICINE | Facility: OTHER | Age: 75
Discharge: HOME | End: 2020-08-18

## 2020-08-18 DIAGNOSIS — G89.29 CHRONIC BILATERAL LOW BACK PAIN WITHOUT SCIATICA: ICD-10-CM

## 2020-08-18 DIAGNOSIS — G89.4 CHRONIC PAIN SYNDROME: ICD-10-CM

## 2020-08-18 DIAGNOSIS — M54.50 CHRONIC BILATERAL LOW BACK PAIN WITHOUT SCIATICA: ICD-10-CM

## 2020-08-25 ENCOUNTER — HOSPITAL ENCOUNTER (OUTPATIENT)
Dept: PALLIATIVE MEDICINE | Facility: OTHER | Age: 75
Discharge: HOME | End: 2020-08-25

## 2020-08-25 ENCOUNTER — COMMUNICATION - HEALTHEAST (OUTPATIENT)
Dept: PALLIATIVE MEDICINE | Facility: OTHER | Age: 75
End: 2020-08-25

## 2020-08-25 DIAGNOSIS — M54.50 CHRONIC BILATERAL LOW BACK PAIN WITHOUT SCIATICA: ICD-10-CM

## 2020-08-25 DIAGNOSIS — G89.4 CHRONIC PAIN SYNDROME: ICD-10-CM

## 2020-08-25 DIAGNOSIS — G89.29 CHRONIC BILATERAL LOW BACK PAIN WITHOUT SCIATICA: ICD-10-CM

## 2020-09-01 ENCOUNTER — HOSPITAL ENCOUNTER (OUTPATIENT)
Dept: PALLIATIVE MEDICINE | Facility: OTHER | Age: 75
Discharge: HOME | End: 2020-09-01

## 2020-09-01 DIAGNOSIS — G89.29 CHRONIC BILATERAL LOW BACK PAIN WITHOUT SCIATICA: ICD-10-CM

## 2020-09-01 DIAGNOSIS — M54.50 CHRONIC BILATERAL LOW BACK PAIN WITHOUT SCIATICA: ICD-10-CM

## 2020-09-01 DIAGNOSIS — G89.4 CHRONIC PAIN SYNDROME: ICD-10-CM

## 2020-09-03 ENCOUNTER — HOSPITAL ENCOUNTER (OUTPATIENT)
Dept: PALLIATIVE MEDICINE | Facility: OTHER | Age: 75
Discharge: HOME | End: 2020-09-03

## 2020-09-03 DIAGNOSIS — G89.4 CHRONIC PAIN SYNDROME: ICD-10-CM

## 2020-09-03 DIAGNOSIS — G89.29 CHRONIC BILATERAL LOW BACK PAIN WITHOUT SCIATICA: ICD-10-CM

## 2020-09-03 DIAGNOSIS — M54.50 CHRONIC BILATERAL LOW BACK PAIN WITHOUT SCIATICA: ICD-10-CM

## 2020-09-08 ENCOUNTER — HOSPITAL ENCOUNTER (OUTPATIENT)
Dept: PALLIATIVE MEDICINE | Facility: OTHER | Age: 75
Discharge: HOME | End: 2020-09-08

## 2020-09-08 DIAGNOSIS — G89.4 CHRONIC PAIN SYNDROME: ICD-10-CM

## 2020-09-08 DIAGNOSIS — M54.50 CHRONIC BILATERAL LOW BACK PAIN WITHOUT SCIATICA: ICD-10-CM

## 2020-09-08 DIAGNOSIS — G89.29 CHRONIC BILATERAL LOW BACK PAIN WITHOUT SCIATICA: ICD-10-CM

## 2021-01-15 ENCOUNTER — HEALTH MAINTENANCE LETTER (OUTPATIENT)
Age: 76
End: 2021-01-15

## 2021-03-04 ENCOUNTER — AMBULATORY - HEALTHEAST (OUTPATIENT)
Dept: NEUROLOGY | Facility: CLINIC | Age: 76
End: 2021-03-04

## 2021-03-04 ENCOUNTER — OFFICE VISIT (OUTPATIENT)
Dept: NEUROLOGY | Facility: CLINIC | Age: 76
End: 2021-03-04
Payer: MEDICARE

## 2021-03-04 VITALS
DIASTOLIC BLOOD PRESSURE: 85 MMHG | SYSTOLIC BLOOD PRESSURE: 161 MMHG | HEART RATE: 87 BPM | BODY MASS INDEX: 21.58 KG/M2 | WEIGHT: 137.5 LBS | HEIGHT: 67 IN

## 2021-03-04 DIAGNOSIS — R41.9 NEUROCOGNITIVE DISORDER: ICD-10-CM

## 2021-03-04 DIAGNOSIS — R41.9 NEUROCOGNITIVE DISORDER: Primary | ICD-10-CM

## 2021-03-04 PROBLEM — R05.3 CHRONIC COUGH: Status: RESOLVED | Noted: 2019-04-03 | Resolved: 2021-03-04

## 2021-03-04 PROBLEM — Z98.1 STATUS POST LUMBAR SPINAL FUSION: Status: ACTIVE | Noted: 2020-02-24

## 2021-03-04 PROBLEM — J31.0 CHRONIC RHINITIS: Status: RESOLVED | Noted: 2019-04-05 | Resolved: 2021-03-04

## 2021-03-04 PROBLEM — K21.9 GASTROESOPHAGEAL REFLUX DISEASE: Status: ACTIVE | Noted: 2018-10-15

## 2021-03-04 PROBLEM — E78.5 HLD (HYPERLIPIDEMIA): Status: ACTIVE | Noted: 2021-03-04

## 2021-03-04 PROBLEM — J38.7 IRRITABLE LARYNX: Status: RESOLVED | Noted: 2019-04-03 | Resolved: 2021-03-04

## 2021-03-04 PROBLEM — Z98.1 STATUS POST LUMBAR SPINAL FUSION: Status: RESOLVED | Noted: 2020-02-24 | Resolved: 2021-03-04

## 2021-03-04 PROBLEM — M48.00 SPINAL STENOSIS: Status: ACTIVE | Noted: 2019-10-24

## 2021-03-04 PROCEDURE — 99205 OFFICE O/P NEW HI 60 MIN: CPT | Performed by: PSYCHIATRY & NEUROLOGY

## 2021-03-04 PROCEDURE — 99417 PROLNG OP E/M EACH 15 MIN: CPT | Performed by: PSYCHIATRY & NEUROLOGY

## 2021-03-04 RX ORDER — FLUTICASONE PROPIONATE 50 MCG
SPRAY, SUSPENSION (ML) NASAL
COMMUNITY
Start: 2020-10-22

## 2021-03-04 RX ORDER — METHOCARBAMOL 500 MG/1
TABLET, FILM COATED ORAL
COMMUNITY
Start: 2021-02-17 | End: 2023-01-01

## 2021-03-04 RX ORDER — PANTOPRAZOLE SODIUM 40 MG/1
TABLET, DELAYED RELEASE ORAL
COMMUNITY
Start: 2020-11-07 | End: 2021-08-27

## 2021-03-04 RX ORDER — PREGABALIN 100 MG/1
100 CAPSULE ORAL 2 TIMES DAILY
COMMUNITY
Start: 2020-07-29 | End: 2022-03-05

## 2021-03-04 RX ORDER — PRAVASTATIN SODIUM 20 MG
20 TABLET ORAL DAILY
COMMUNITY
Start: 2019-07-25

## 2021-03-04 RX ORDER — DULOXETIN HYDROCHLORIDE 60 MG/1
60 CAPSULE, DELAYED RELEASE ORAL DAILY
COMMUNITY
Start: 2020-10-16

## 2021-03-04 RX ORDER — LOSARTAN POTASSIUM 100 MG/1
100 TABLET ORAL DAILY
COMMUNITY
Start: 2020-12-17

## 2021-03-04 RX ORDER — FAMOTIDINE 40 MG/1
20 TABLET, FILM COATED ORAL EVERY EVENING
COMMUNITY
Start: 2020-12-16

## 2021-03-04 RX ORDER — ZOLPIDEM TARTRATE 5 MG/1
5 TABLET ORAL AT BEDTIME
COMMUNITY
Start: 2020-07-14 | End: 2023-01-01

## 2021-03-04 ASSESSMENT — MONTREAL COGNITIVE ASSESSMENT (MOCA)
11. FOR EACH PAIR OF WORDS, WHAT CATEGORY DO THEY BELONG TO (OUT OF 2): 2
10. [FLUENCY] NAME WORDS STARTING WITH DESIGNATED LETTER: 0
WHAT IS THE TOTAL SCORE (OUT OF 30): 23
12. MEMORY INDEX SCORE: 2
8. SERIAL SUBTRACTION OF 7S: 3
7. [VIGILENCE] TAP WHEN HEARING DESIGNATED LETTER: 1
4. NAME EACH OF THE THREE ANIMALS SHOWN: 3
9. REPEAT EACH SENTENCE: 2
13. ORIENTATION SUBSCORE: 6
VISUOSPATIAL/EXECUTIVE SUBSCORE: 2
WHAT LEVEL OF EDUCATION WAS ATTAINED: 0
6. READ LIST OF DIGITS [FORWARD/BACKWARD]: 2

## 2021-03-04 ASSESSMENT — MIFFLIN-ST. JEOR: SCORE: 1156.33

## 2021-03-04 NOTE — PROGRESS NOTES
NEUROLOGY CONSULTATION NOTE       [facility] [place]  [address] [place] [address]  Tel: [phone]  Fax: [phone]  [email or web address]     Leonardo De La Rosa,  1945, MRN [id number]  PCP: Cassie Ortega, None  Date: 3/4/2021     ASSESSMENT & PLAN     Diagnosis code  Neurocognitive disorder     Neurocognitive disorder  75-year-old female with sensorineural hearing loss, gastroesophageal reflux, osteoporosis was referred for evaluation of progressive cognitive decline.  She is well-known to me as she comes with her  regularly who has history of intracranial hemorrhage and dementia.  She scored 23/30 on [place] cognitive assessment.  I do suspect due to family dynamics she is under a lot of stress resulting in depression and anxiety that is accentuating her mild cognitive impairment.  It is reassuring that her MRI of the head and MRA are normal but I have recommended lab work to include folate, homocystine, methylmalonic acid level, TSH, vitamin B1, vitamin B12 and alpha-tocopherol.  Additionally, neuropsychological evaluation will be done.  Follow-up will be after above tests and if it confirms pseudodementia, we might need to make adjustment in the dose of Cymbalta or consider different antidepressant.    Thank you again for this referral, please feel free to contact me if you have any questions.    Augustin Terry MD  [facility]  (Former[facility], [facility]     REASON FOR CONSULTATION Memory Loss        HISTORY OF PRESENT ILLNESS     We have been requested by Dr. Ortega to evaluate Leonardo De La Rosa who is a 75 year old  female for cognitive decline    Patient is a 75-year-old female with history of sensorineural hearing loss, osteoporosis, gastroesophageal reflux who was referred for evaluation of progressive cognitive decline with frequent fall, repeating herself and at times not recalling events  leading up to her fall.  I know patient well as I take care of her  and she is brought in today at the insistence of her daughter who recently moved back home as she was concerned patient is having difficulty with memory she tends to get confused easily and has fallen down few times.  She recently injured her thumb and did not recall how that happened.  She recently was also evaluated for right ankle fracture.  She was doing FaceTime with her daughter when she suddenly fell forward resulting in fracture of the right ankle.  She is quite embarrassed coming to see neurologist as she does not want to discuss her problems but does admit that taking care of her  who has dementia with history of intracranial hemorrhage is taking an emotional toll.  He has become more stubborn and his lack of empathy is impairing her down.  She does do her best to place him but feels her life is in a rut and she is going around in circles.  There is no history of any auditory or visual hallucination or any bladder incontinence.  She does admit that due to her ongoing symptoms she has started drinking and is embarrassed about it as she feels she is a [employer] person and should not be doing this.  She has not done anything that puts her or other people at risk.  She is still able to attend to activities of daily living and also takes care of her      PROBLEM LIST   Patient Active Problem List   Diagnosis Code     Gastroesophageal reflux disease K21.9     Osteoporosis M81.0     Sensorineural hearing loss, bilateral H90.3     Spinal stenosis M48.00     HLD (hyperlipidemia) E78.5         PAST MEDICAL & SURGICAL HISTORY     Past Medical History:   Patient  has a past medical history of Arthritis, PONV (postoperative nausea and vomiting), Postsurgical arthrodesis status (4/23/2013), and Status post lumbar spinal fusion (2/24/2020).    Surgical History:  She  has a past surgical history that includes TOTAL KNEE ARTHROPLASTY;  "Foot surgery; Hysterectomy total abdominal, bilateral salpingo-oophorectomy, combined; Arthrodesis foot (4/22/2013); and Bunionectomy lapidus with tarsal metatarsal (TMT) fusion (4/22/2013).     SOCIAL HISTORY     Reviewed, and she  reports that she has never smoked. She has never used smokeless tobacco. She reports that she does not drink alcohol or use drugs.     FAMILY HISTORY     Reviewed, and family history includes Coronary Artery Disease in her mother.     ALLERGIES     Allergies   Allergen Reactions     Estratest H.S. Other (See Comments) and Unknown     Other reaction(s): Other (see comments)  Increase lipids  Outside source did not list reaction  Increase lipids       Propofol Nausea and Vomiting     Outside source states \"injectable and inhaled\"  Outside source states \"injectable and inhaled\"  Outside source states \"injectable and inhaled\"  Outside source states \"injectable and inhaled\"       Reglan      Estrogens Rash     Other reaction(s): Other (see comments)  Outside source did not list reaction  Estrogen patches, from patch only, ( Adhesive )  does tolerate estrogen other forms        Penicillins Nausea and Vomiting and Nausea     Other reaction(s): GI intolerance, GI Upset  Patient states upset stomach.  Patient states upset stomach.  Patient states upset stomach.  Patient states upset stomach.           REVIEW OF SYSTEMS     A 12 point review of system was performed and was negative except as outlined in the history of present illness.     HOME MEDICATIONS       Current Outpatient Medications:      acetaminophen (TYLENOL) 325 MG tablet, Take 3 tablets by mouth every 8 hours., Disp: 100 tablet, Rfl: 0     calcium carbonate-vitamin D (CALCIUM + D) 600-200 MG-UNIT TABS, Take 1 tablet by mouth daily., Disp: , Rfl:      Celecoxib (CELEBREX PO), Take 200 mg by mouth daily., Disp: , Rfl:      Chlorpheniramine-Phenylephrine 4-10 MG TABS, , Disp: , Rfl:      Cholecalciferol (VITAMIN D3 PO), Take 1,000 Units " "by mouth daily., Disp: , Rfl:      denosumab (PROLIA) 60 MG/ML SOSY injection, Inject 60 mg Subcutaneous once, Disp: , Rfl:      DULoxetine HCl 40 MG CPEP, TAKE 1 CAPSULE BY MOUTH EVERY DAY, Disp: , Rfl:      famotidine (PEPCID) 40 MG tablet, TAKE 1 TABLET BY MOUTH TWICE A DAY, Disp: , Rfl:      fluocin-hydroquinone-tretinoin (TRI-RODNEY) 0.01-4-0.05 % CREA, Externally apply topically At Bedtime, Disp: 30 g, Rfl: 3     fluticasone (FLONASE) 50 MCG/ACT nasal spray, INSTILL 1 SPRAY INTO EACH NOSTRIL ONCE DAILY AS NEEDED FOR RHINITIS, Disp: , Rfl:      glucosamine-chondroitin 500-400 MG CAPS, Take 1 capsule by mouth daily., Disp: , Rfl:      losartan (COZAAR) 100 MG tablet, Take 100 mg by mouth, Disp: , Rfl:      medical cannabis (Patient's own supply), See Admin Instructions (The purpose of this order is to document that the patient reports taking medical cannabis.  This is not a prescription, and is not used to certify that the patient has a qualifying medical condition.), Disp: , Rfl:      methocarbamol (ROBAXIN) 500 MG tablet, TAKE 1 TABLET BY MOUTH 3 TIMES A DAY, Disp: , Rfl:      multivitamin, therapeutic with minerals (MULTI-VITAMIN) TABS, Take 1 tablet by mouth daily., Disp: , Rfl:      Omega-3 Fatty Acids (OMEGA-3 FISH OIL PO), Take 1,200 g by mouth every 7 days , Disp: , Rfl:      pantoprazole (PROTONIX) 40 MG EC tablet, TAKE 1 TABLET BY MOUTH TWICE A DAY BEFORE MEALS, Disp: , Rfl:      pravastatin (PRAVACHOL) 40 MG tablet, Take 40 mg by mouth, Disp: , Rfl:      pregabalin (LYRICA) 100 MG capsule, Take 100 mg by mouth daily, Disp: , Rfl:      zolpidem (AMBIEN) 5 MG tablet, Take 5 mg by mouth, Disp: , Rfl:      ASPIRIN EC PO, Take 81 mg by mouth daily., Disp: , Rfl:       PHYSICAL EXAM     Vital signs  BP (!) 161/85 (BP Location: Left arm, Patient Position: Sitting)   Pulse 87   Ht 1.702 m (5' 7\")   Wt 62.4 kg (137 lb 8 oz)   BMI 21.54 kg/m      Weight:   137 lbs 8 oz    [place] Cognitive Assessment:  "   [place] Cognitive Assessment (MOCA)  Visuospatial/Executive : 2  Naming: 3  Attention - Digits: 2  Attention - Letters: 1  Attention - Subtraction: 3  Language - Repeat: 2  Language - Fluency : 0  Abstraction: 2  Delayed Recall: 2  Orientation: 6  Education: 0  MOCA Score: 23     [place] Cognitive Assessment Score:  MOCA Score: 23/30.    Patient is alert and oriented x4 in no acute distress. Vital signs were reviewed and are documented in electronic medical record. Neck was supple, no carotid bruits, thyromegaly, JVD, or lymphadenopathy was noted.   NEUROLOGY EXAM:    Patient s speech was normal with no aphasia or dysarthria. Mentation, and affect were also normal.     Funduscopic exam was normal, with normal cup to disc ratio. Cranial nerves II -XII were intact.     Patient had normal mass, tone and motor strength was 5/5 in all extremities without pronator drift.     Sensation was intact to light touch, pinprick, and vibratory sensation.     Reflexes were 0 symmetrical with downgoing toes.     No dysmetria noted on FNF or HKS.     Gait testing normal and could not be adequately tested due to right ankle fracture     DIAGNOSTIC STUDIES     PERTINENT RADIOLOGY  Following imaging studies were reviewed:     CT BRAIN 12/17/2020  1.  No acute intracranial process.  2.  Minimal chronic small vessel ischemic disease.    CT LUMBAR SPINE 9/23/2020  1. New but subacute to chronic appearing fractures of L2, including mild  superior endplate compression and incompletely healed bilateral pedicle  fractures, which on the left extend into the posterosuperior vertebral body.  2. Postoperative changes including anterior and posterior instrumented fusion at  L5-S1. No hardware failure. No advanced spinal canal or neural foraminal  narrowing at any level.    MRI, MRA BRAIN 2/26/2021  HEAD MRI:   1.  Mild chronic small vessel ischemic changes and age-related changes.  2.  Otherwise normal brain MRI.    HEAD MRA:   1.  Normal MRA  [place].    NECK MRA:  1.  Minimal plaque in the right carotid bulb, but no significant associated stenosis by NASCET criteria.  2.   Otherwise normal neck MRA.    MRI LUMBAR SPINE 9/23/2020  No new spondylolisthesis since 05/04/2020. Interval subacute  appearing superior L2 compression fracture and bilateral pedicle fractures.  Stable chronic healing fracture through the posterolateral left L3 vertebral  body and pedicle.     PERTINENT LABS  Following labs were reviewed:  Component Value Ref Range Performed At Pathologist Signature   IRON 56 25 - 156 ug/dL [facility]     UIBC (UNSATURATED)  323   [facility]     IRON BINDING CAPACITY  379 245 - 400 ug/dL [facility]     IRON,% SATURATION  15 (L) 20 - 55 % [facility]               Total time spent for face to face visit, reviewing labs/imaging studies, counseling and coordination of care was: 1 Hour 15 Minutes spent on the date of the encounter doing chart review, review of outside records, review of test results, interpretation of tests, patient visit, documentation and discussion with family     This note was dictated using voice recognition software.  Any grammatical or context distortions are unintentional and inherent to the software.

## 2021-03-04 NOTE — LETTER
3/4/2021         RE: Leonardo De La Rosa  [address] [facility] [address]        Dear Colleague,    Thank you for referring your patient, Leonardo De La Rosa, to the [facility] [place]. Please see a copy of my visit note below.    NEUROLOGY CONSULTATION NOTE       [facility] [place]  [address] [place] [address]  Tel: [phone]  Fax: [phone]  [email or web address]     Leonardo De La Rosa,  1945, MRN [id number]  PCP: Cassie Ortega, None  Date: 3/4/2021     ASSESSMENT & PLAN     Diagnosis code  1. Neurocognitive disorder     Neurocognitive disorder  75-year-old female with sensorineural hearing loss, gastroesophageal reflux, osteoporosis was referred for evaluation of progressive cognitive decline.  She is well-known to me as she comes with her  regularly who has history of intracranial hemorrhage and dementia.  She scored 23/30 on [facility] cognitive assessment.  I do suspect due to family dynamics she is under a lot of stress resulting in depression and anxiety that is accentuating her mild cognitive impairment.  It is reassuring that her MRI of the head and MRA are normal but I have recommended lab work to include folate, homocystine, methylmalonic acid level, TSH, vitamin B1, vitamin B12 and alpha-tocopherol.  Additionally, neuropsychological evaluation will be done.  Follow-up will be after above tests and if it confirms pseudodementia, we might need to make adjustment in the dose of Cymbalta or consider different antidepressant.    Thank you again for this referral, please feel free to contact me if you have any questions.    Augustin Terry MD  [facility]  ([facility], [facility]     REASON FOR CONSULTATION Memory Loss        HISTORY OF PRESENT ILLNESS     We have been requested by Dr. Ortega to evaluate Leonardo De La Rosa who is a 75 year old  female for cognitive  decline    Patient is a 75-year-old female with history of sensorineural hearing loss, osteoporosis, gastroesophageal reflux who was referred for evaluation of progressive cognitive decline with frequent fall, repeating herself and at times not recalling events leading up to her fall.  I know patient well as I take care of her  and she is brought in today at the insistence of her daughter who recently moved back home as she was concerned patient is having difficulty with memory she tends to get confused easily and has fallen down few times.  She recently injured her thumb and did not recall how that happened.  She recently was also evaluated for right ankle fracture.  She was doing FaceTime with her daughter when she suddenly fell forward resulting in fracture of the right ankle.  She is quite embarrassed coming to see neurologist as she does not want to discuss her problems but does admit that taking care of her  who has dementia with history of intracranial hemorrhage is taking an emotional toll.  He has become more stubborn and his lack of empathy is impairing her down.  She does do her best to place him but feels her life is in a rut and she is going around in circles.  There is no history of any auditory or visual hallucination or any bladder incontinence.  She does admit that due to her ongoing symptoms she has started drinking and is embarrassed about it as she feels she is a [employer] person and should not be doing this.  She has not done anything that puts her or other people at risk.  She is still able to attend to activities of daily living and also takes care of her      PROBLEM LIST   Patient Active Problem List   Diagnosis Code     Gastroesophageal reflux disease K21.9     Osteoporosis M81.0     Sensorineural hearing loss, bilateral H90.3     Spinal stenosis M48.00     HLD (hyperlipidemia) E78.5         PAST MEDICAL & SURGICAL HISTORY     Past Medical History:   Patient  has a past  "medical history of Arthritis, PONV (postoperative nausea and vomiting), Postsurgical arthrodesis status (4/23/2013), and Status post lumbar spinal fusion (2/24/2020).    Surgical History:  She  has a past surgical history that includes TOTAL KNEE ARTHROPLASTY; Foot surgery; Hysterectomy total abdominal, bilateral salpingo-oophorectomy, combined; Arthrodesis foot (4/22/2013); and Bunionectomy lapidus with tarsal metatarsal (TMT) fusion (4/22/2013).     SOCIAL HISTORY     Reviewed, and she  reports that she has never smoked. She has never used smokeless tobacco. She reports that she does not drink alcohol or use drugs.     FAMILY HISTORY     Reviewed, and family history includes Coronary Artery Disease in her mother.     ALLERGIES     Allergies   Allergen Reactions     Estratest H.S. Other (See Comments) and Unknown     Other reaction(s): Other (see comments)  Increase lipids  Outside source did not list reaction  Increase lipids       Propofol Nausea and Vomiting     Outside source states \"injectable and inhaled\"  Outside source states \"injectable and inhaled\"  Outside source states \"injectable and inhaled\"  Outside source states \"injectable and inhaled\"       Reglan      Estrogens Rash     Other reaction(s): Other (see comments)  Outside source did not list reaction  Estrogen patches, from patch only, ( Adhesive )  does tolerate estrogen other forms        Penicillins Nausea and Vomiting and Nausea     Other reaction(s): GI intolerance, GI Upset  Patient states upset stomach.  Patient states upset stomach.  Patient states upset stomach.  Patient states upset stomach.           REVIEW OF SYSTEMS     A 12 point review of system was performed and was negative except as outlined in the history of present illness.     HOME MEDICATIONS       Current Outpatient Medications:      acetaminophen (TYLENOL) 325 MG tablet, Take 3 tablets by mouth every 8 hours., Disp: 100 tablet, Rfl: 0     calcium carbonate-vitamin D (CALCIUM + " D) 600-200 MG-UNIT TABS, Take 1 tablet by mouth daily., Disp: , Rfl:      Celecoxib (CELEBREX PO), Take 200 mg by mouth daily., Disp: , Rfl:      Chlorpheniramine-Phenylephrine 4-10 MG TABS, , Disp: , Rfl:      Cholecalciferol (VITAMIN D3 PO), Take 1,000 Units by mouth daily., Disp: , Rfl:      denosumab (PROLIA) 60 MG/ML SOSY injection, Inject 60 mg Subcutaneous once, Disp: , Rfl:      DULoxetine HCl 40 MG CPEP, TAKE 1 CAPSULE BY MOUTH EVERY DAY, Disp: , Rfl:      famotidine (PEPCID) 40 MG tablet, TAKE 1 TABLET BY MOUTH TWICE A DAY, Disp: , Rfl:      fluocin-hydroquinone-tretinoin (TRI-RODNEY) 0.01-4-0.05 % CREA, Externally apply topically At Bedtime, Disp: 30 g, Rfl: 3     fluticasone (FLONASE) 50 MCG/ACT nasal spray, INSTILL 1 SPRAY INTO EACH NOSTRIL ONCE DAILY AS NEEDED FOR RHINITIS, Disp: , Rfl:      glucosamine-chondroitin 500-400 MG CAPS, Take 1 capsule by mouth daily., Disp: , Rfl:      losartan (COZAAR) 100 MG tablet, Take 100 mg by mouth, Disp: , Rfl:      medical cannabis (Patient's own supply), See Admin Instructions (The purpose of this order is to document that the patient reports taking medical cannabis.  This is not a prescription, and is not used to certify that the patient has a qualifying medical condition.), Disp: , Rfl:      methocarbamol (ROBAXIN) 500 MG tablet, TAKE 1 TABLET BY MOUTH 3 TIMES A DAY, Disp: , Rfl:      multivitamin, therapeutic with minerals (MULTI-VITAMIN) TABS, Take 1 tablet by mouth daily., Disp: , Rfl:      Omega-3 Fatty Acids (OMEGA-3 FISH OIL PO), Take 1,200 g by mouth every 7 days , Disp: , Rfl:      pantoprazole (PROTONIX) 40 MG EC tablet, TAKE 1 TABLET BY MOUTH TWICE A DAY BEFORE MEALS, Disp: , Rfl:      pravastatin (PRAVACHOL) 40 MG tablet, Take 40 mg by mouth, Disp: , Rfl:      pregabalin (LYRICA) 100 MG capsule, Take 100 mg by mouth daily, Disp: , Rfl:      zolpidem (AMBIEN) 5 MG tablet, Take 5 mg by mouth, Disp: , Rfl:      ASPIRIN EC PO, Take 81 mg by mouth daily.,  "Disp: , Rfl:       PHYSICAL EXAM     Vital signs  BP (!) 161/85 (BP Location: Left arm, Patient Position: Sitting)   Pulse 87   Ht 1.702 m (5' 7\")   Wt 62.4 kg (137 lb 8 oz)   BMI 21.54 kg/m      Weight:   137 lbs 8 oz    [facility] Cognitive Assessment:    [facility] Cognitive Assessment (MOCA)  Visuospatial/Executive : 2  Naming: 3  Attention - Digits: 2  Attention - Letters: 1  Attention - Subtraction: 3  Language - Repeat: 2  Language - Fluency : 0  Abstraction: 2  Delayed Recall: 2  Orientation: 6  Education: 0  MOCA Score: 23     [facility] Cognitive Assessment Score:  MOCA Score: 23/30.    Patient is alert and oriented x4 in no acute distress. Vital signs were reviewed and are documented in electronic medical record. Neck was supple, no carotid bruits, thyromegaly, JVD, or lymphadenopathy was noted.   NEUROLOGY EXAM:    Patient s speech was normal with no aphasia or dysarthria. Mentation, and affect were also normal.     Funduscopic exam was normal, with normal cup to disc ratio. Cranial nerves II -XII were intact.     Patient had normal mass, tone and motor strength was 5/5 in all extremities without pronator drift.     Sensation was intact to light touch, pinprick, and vibratory sensation.     Reflexes were 0 symmetrical with downgoing toes.     No dysmetria noted on FNF or HKS.     Gait testing normal and could not be adequately tested due to right ankle fracture     DIAGNOSTIC STUDIES     PERTINENT RADIOLOGY  Following imaging studies were reviewed:     CT BRAIN 12/17/2020  1.  No acute intracranial process.  2.  Minimal chronic small vessel ischemic disease.    CT LUMBAR SPINE 9/23/2020  1. New but subacute to chronic appearing fractures of L2, including mild  superior endplate compression and incompletely healed bilateral pedicle  fractures, which on the left extend into the posterosuperior vertebral body.  2. Postoperative changes including anterior and posterior instrumented fusion at  L5-S1. No " hardware failure. No advanced spinal canal or neural foraminal  narrowing at any level.    MRI, MRA BRAIN 2/26/2021  HEAD MRI:   1.  Mild chronic small vessel ischemic changes and age-related changes.  2.  Otherwise normal brain MRI.    HEAD MRA:   1.  Normal MRA [place] [facility] [place].    NECK MRA:  1.  Minimal plaque in the right carotid bulb, but no significant associated stenosis by NASCET criteria.  2.   Otherwise normal neck MRA.    MRI LUMBAR SPINE 9/23/2020  No new spondylolisthesis since 05/04/2020. Interval subacute  appearing superior L2 compression fracture and bilateral pedicle fractures.  Stable chronic healing fracture through the posterolateral left L3 vertebral  body and pedicle.     PERTINENT LABS  Following labs were reviewed:  Component Value Ref Range Performed At Pathologist Signature   IRON 56 25 - 156 ug/dL [facility]     UIBC (UNSATURATED)  323   [facility]     IRON BINDING CAPACITY  379 245 - 400 ug/dL [facility]     IRON,% SATURATION  15 (L) 20 - 55 % [facility]               Total time spent for face to face visit, reviewing labs/imaging studies, counseling and coordination of care was: 1 Hour 15 Minutes spent on the date of the encounter doing chart review, review of outside records, review of test results, interpretation of tests, patient visit, documentation and discussion with family       This note was dictated using voice recognition software.  Any grammatical or context distortions are unintentional and inherent to the software.       Again, thank you for allowing me to participate in the care of your patient.        Sincerely,        Augustin Terry MD

## 2021-03-04 NOTE — NURSING NOTE
Celi Carson - 02/26/2021  2:37 PM CST  EXAM: MR HEAD BRAIN STROKE WO MR ANGIO HEAD WO NECK WWO  LOCATION: [facility]  DATE/TIME: 2/26/2021 10:48 AM    INDICATION: Other Transient Cerebral Ischemic Attacks And Related Syndromes Unsteady Gait Weakness Of Lower Extremity, Unspecified Laterality Memory Loss  COMPARISON: None.  CONTRAST: None.  TECHNIQUE:   1) Routine multiplanar multisequence head MRI without and with intravenous contrast.  2) 3D time-of-flight head MRA without intravenous contrast.  3) Neck MRA without and with IV contrast. Stenosis measurements made according to NASCET criteria unless otherwise specified.    FINDINGS:  HEAD MRI:  INTRACRANIAL CONTENTS: No restricted diffusion, and no acute or subacute infarct. No mass, acute hemorrhage, or extra-axial fluid collections. Minor patchy and punctate areas of high T2 and FLAIR signal in the bilateral corona radiata. These are nonspecific but likely reflect sequelae of mild chronic small vessel ischemic disease. Otherwise normal brain parenchymal signal. Moderate diffuse brain volume loss with some mild ex vacuo enlargement of the ventricular system. No hydrocephalus. Normal position of the cerebellar tonsils. No pathologic contrast enhancement.    SELLA: No abnormality accounting for technique.    OSSEOUS STRUCTURES/SOFT TISSUES: Normal marrow signal. The major intracranial vascular flow voids are maintained.     ORBITS: No abnormality accounting for technique.     SINUSES/MASTOIDS: Mild mucosal thickening in the right frontal ethmoidal recess. Paranasal sinuses otherwise clear. No middle ear or mastoid effusion.     HEAD MRA:   ANTERIOR CIRCULATION: No stenosis/occlusion, aneurysm, or high flow vascular malformation. Standard [place] anatomy.    POSTERIOR CIRCULATION: No stenosis/occlusion, aneurysm, or high flow vascular malformation. Balanced vertebral arteries supply a normal basilar artery.     NECK MRA:   RIGHT  CAROTID: Minimal plaque at the distal aspect of the right carotid bulb, but no significant stenosis by NASCET criteria. No dissection.    LEFT CAROTID: No measurable stenosis or dissection.    VERTEBRAL ARTERIES: No focal stenosis or dissection. Balanced vertebral arteries.    AORTIC ARCH: Classic aortic arch anatomy with no significant stenosis at the origin of the great vessels.    IMPRESSION:  HEAD MRI:   1.  Mild chronic small vessel ischemic changes and age-related changes.  2.  Otherwise normal brain MRI.    HEAD MRA:   1.  Normal MRA [place].    NECK MRA:  1.  Minimal plaque in the right carotid bulb, but no significant associated stenosis by NASCET criteria.  2.   Otherwise normal neck MRA.

## 2021-03-04 NOTE — NURSING NOTE
Chief Complaint   Patient presents with     Memory Loss     Memory loss, falls, confusion, imbalance [id number]     Leonora Martinez CMA on 3/4/2021 at 1:13 PM

## 2021-03-04 NOTE — NURSING NOTE
[place] COGNITIVE ASSESSMENT (MOCA)  Version 7.1 Original Version  VISUOSPATIAL/EXECUTIVE               COPY CUBE      [ 0   ]                                [  0  ] DRAW CLOCK (Ten past eleven)  (3 points)    [1    ]                    [1  ]               [  0 ]       Contour            Numbers     Hands POINTS                2   / 5   NAMING    [  1 ]                                                                        [ 1   ]                                              1   ]  Linicolas Clark                                Camel                 3    / 3   MEMORY Read list of words, subject must repeat them. Do 2 trials, even if 1st trial is successful. Do a recall after 5 minutes  FACE VELVET Yarsani LUCAS RED No Points    1st          2nd         ATTENTION Read list of digits (1 digit/sec) Subject has to repeat in the forward order       [  1  ]   2  1  8  5  4                                [  1  ] 7 4 2                        2  /2   Read list of letters. The subject must tap with his hand at each letter A. No points if > 2 errors.  [    ] F B A C M N A A J K L B A F A K D E A A A J A M O F A A B            1  /1   Serial 7 subtraction starting at 100          [ 1   ] 93         [  1  ] 86          [ 1 ] 79          [  1  ] 72         [ 1   ] 65   4 or 5 correct subtractions: 3 points,  2 or 3 correct: 2 points,  1correct: 1 point,   0 correct: 0 points         3   /3   LANGUAGE Repeat: I only know that Kyler is the one to help today. [ 1    ]                                      The cat always hid under the couch when dogs were in the room. [ 1  ]            2   /2   Fluency: Name maximum number of words in one minute that begin with the letter F                                                                                                                    [ 0   ] _10__ (N > 11 words)           0    /1   ABSTRACTION Similarity between  e.g. banana-orange=fruit                                                                   [  1  ] train-bicycle                      [1    ] watch-ruler         2     /2   DELAYED  RECALL Has to recall words  WITH NO CUE FACE  [    ] VELVET  [    ] Jain  [    ]  LUCAS  [  1  ] RED  [ 1   ] Points for UNCUED recall only        2    /5           OPTIONAL Category cue           Multiple choice cue          ORIENTATION  [  1  ] Date     [1    ] Month       [ 1   ] Year      [ 1   ] Day      [ 1   ] Place        [ 1   ] City       6 /6   TOTAL  Normal > 26/30 Add 1 point if < 12 years education      23 /30

## 2021-03-25 ENCOUNTER — HOSPITAL ENCOUNTER (OUTPATIENT)
Dept: NEUROLOGY | Facility: CLINIC | Age: 76
Setting detail: THERAPIES SERIES
Discharge: STILL ADMITTED OR ADMINISTRATIVE | End: 2021-03-25
Attending: CLINICAL NEUROPSYCHOLOGIST

## 2021-03-25 DIAGNOSIS — F43.22 ADJUSTMENT DISORDER WITH ANXIETY: ICD-10-CM

## 2021-04-08 ENCOUNTER — HOSPITAL ENCOUNTER (OUTPATIENT)
Dept: NEUROLOGY | Facility: CLINIC | Age: 76
Setting detail: THERAPIES SERIES
Discharge: STILL ADMITTED OR ADMINISTRATIVE | End: 2021-04-08
Attending: CLINICAL NEUROPSYCHOLOGIST

## 2021-04-08 DIAGNOSIS — F43.22 ADJUSTMENT DISORDER WITH ANXIETY: ICD-10-CM

## 2021-04-12 ENCOUNTER — OFFICE VISIT (OUTPATIENT)
Dept: NEUROLOGY | Facility: CLINIC | Age: 76
End: 2021-04-12
Payer: MEDICARE

## 2021-04-12 VITALS
HEIGHT: 67 IN | HEART RATE: 76 BPM | WEIGHT: 138 LBS | DIASTOLIC BLOOD PRESSURE: 76 MMHG | BODY MASS INDEX: 21.66 KG/M2 | SYSTOLIC BLOOD PRESSURE: 143 MMHG

## 2021-04-12 DIAGNOSIS — R41.89 PSEUDODEMENTIA: ICD-10-CM

## 2021-04-12 PROCEDURE — 99214 OFFICE O/P EST MOD 30 MIN: CPT | Performed by: PSYCHIATRY & NEUROLOGY

## 2021-04-12 ASSESSMENT — MIFFLIN-ST. JEOR: SCORE: 1158.59

## 2021-04-12 NOTE — LETTER
2021         RE: Leonardo De La Rosa  [address] [facility] [address]        Dear Colleague,    Thank you for referring your patient, Leonardo De La Rosa, to the [facility] [place]. Please see a copy of my visit note below.    NEUROLOGY FOLLOW UP VISIT  NOTE       [facility] [place]  [address] [place] [address]  Tel: [phone]  Fax: [phone]  [email or web address]     Leonardo De La Rosa,  1945, MRN [id number]  PCP: Cassie Ortega, None  Date: 2021      ASSESSMENT & PLAN     Diagnosis code  1. Pseudodementia     Pseudodementia  75-year-old female with sensorineural hearing loss, good, osteoporosis was recently evaluated for progressive cognitive decline.  Although she scored 23/30 on Carlos Eduardo cognitive assessment, formal neuropsychological testing was unremarkable MRI of the brain, MRA and lab work for common causes of cognitive decline was also normal.  She is under a lot of stress due to her 's situation who has dementia but finds it difficult to except that her symptoms could be related to depression and anxiety.  She is already on Cymbalta 40 mg daily and I would recommend switching her to Zoloft or Paxil.  She was confused about the dose of Cymbalta and initially commented that she was taking 300 mg a day which is a fairly high dose and was somewhat surprised to learn that her pharmacy is only filling 40 mg daily.  She will check her home supply and will let us know if she is interested in switching to a different SSRI.  She will also benefit from seeing a psychologist but as noted above she has difficulty excepting that depression and anxiety could be the cause for her symptoms.  Follow-up will be in 2 months    Thank you again for this referral, please feel free to contact me if you have any questions.    Augustin Terry MD  [facility]  (Former[facility], [facility]     HISTORY OF PRESENT ILLNESS     Patient is a 75-year-old female with history of sensorineural hearing loss, osteoporosis, GERD who was seen on 3/4/2021 for progressive cognitive decline with frequent fall and repeating herself at times not recalling events leading up to her fall.  Her daughters were concerned that patient was having memory difficulty and she tends to get confused easily and fell down few times not recalling how she ended up falling.  This also resulted in right ankle fracture.  She was quite reluctant coming to see a neurologist as she did not want to discuss her medical problems but does admit taking care of her  who has dementia and intracranial hemorrhage takes an emotional toll.  He is high maintenance and due to cultural issues they are having difficulty managing issues they are facing in later part of the area life.  She had MRI of the head and MRA that was unremarkable and also had some lab work that included normal vitamin B12, TSH, vitamin B1, methylmalonic acid level, homocysteine, folic acid and alpha-tocopherol.  She also had had neuropsychological testing done that did not find any cognitive disorder and patient was felt to have adjustment disorder with anxiety.  Since her last visit she reports ongoing difficulty with her memory.  She feels quite overwhelmed taking care of her  Who at times can be quite stubborn.  She is happy to learn that she does not have dementia but has difficulty excepting that her symptoms could be depression or anxiety related.     PROBLEM LIST   Patient Active Problem List   Diagnosis Code     Gastroesophageal reflux disease K21.9     Osteoporosis M81.0     Sensorineural hearing loss, bilateral H90.3     Spinal stenosis M48.00     HLD (hyperlipidemia) E78.5     Pseudodementia R41.89         PAST MEDICAL & SURGICAL HISTORY     Past Medical History:   Patient  has a past medical history of Arthritis, PONV (postoperative nausea and  "vomiting), Postsurgical arthrodesis status (4/23/2013), and Status post lumbar spinal fusion (2/24/2020).    Surgical History:  She  has a past surgical history that includes TOTAL KNEE ARTHROPLASTY; Foot surgery; Hysterectomy total abdominal, bilateral salpingo-oophorectomy, combined; Arthrodesis foot (4/22/2013); and Bunionectomy lapidus with tarsal metatarsal (TMT) fusion (4/22/2013).     SOCIAL HISTORY     Reviewed, and she  reports that she has never smoked. She has never used smokeless tobacco. She reports that she does not drink alcohol or use drugs.     FAMILY HISTORY     Reviewed, and family history includes Coronary Artery Disease in her mother.     ALLERGIES     Allergies   Allergen Reactions     Estratest H.S. Other (See Comments) and Unknown     Other reaction(s): Other (see comments)  Increase lipids  Outside source did not list reaction  Increase lipids       Propofol Nausea and Vomiting     Outside source states \"injectable and inhaled\"  Outside source states \"injectable and inhaled\"  Outside source states \"injectable and inhaled\"  Outside source states \"injectable and inhaled\"       Reglan      Estrogens Rash     Other reaction(s): Other (see comments)  Outside source did not list reaction  Estrogen patches, from patch only, ( Adhesive )  does tolerate estrogen other forms        Penicillins Nausea and Vomiting and Nausea     Other reaction(s): GI intolerance, GI Upset  Patient states upset stomach.  Patient states upset stomach.  Patient states upset stomach.  Patient states upset stomach.           REVIEW OF SYSTEMS     A 12 point review of system was performed and was negative except as outlined in the history of present illness.     HOME MEDICATIONS     Current Outpatient Rx   Medication Sig Dispense Refill     acetaminophen (TYLENOL) 325 MG tablet Take 3 tablets by mouth every 8 hours. 100 tablet 0     ASPIRIN EC PO Take 81 mg by mouth daily.       calcium carbonate-vitamin D (CALCIUM + D) " "600-200 MG-UNIT TABS Take 1 tablet by mouth daily.       Celecoxib (CELEBREX PO) Take 200 mg by mouth daily.       Chlorpheniramine-Phenylephrine 4-10 MG TABS        Cholecalciferol (VITAMIN D3 PO) Take 1,000 Units by mouth daily.       denosumab (PROLIA) 60 MG/ML SOSY injection Inject 60 mg Subcutaneous once       DULoxetine HCl 40 MG CPEP TAKE 1 CAPSULE BY MOUTH EVERY DAY       famotidine (PEPCID) 40 MG tablet TAKE 1 TABLET BY MOUTH TWICE A DAY       fluocin-hydroquinone-tretinoin (TRI-RODNEY) 0.01-4-0.05 % CREA Externally apply topically At Bedtime 30 g 3     fluticasone (FLONASE) 50 MCG/ACT nasal spray INSTILL 1 SPRAY INTO EACH NOSTRIL ONCE DAILY AS NEEDED FOR RHINITIS       glucosamine-chondroitin 500-400 MG CAPS Take 1 capsule by mouth daily.       losartan (COZAAR) 100 MG tablet Take 100 mg by mouth       medical cannabis (Patient's own supply) See Admin Instructions (The purpose of this order is to document that the patient reports taking medical cannabis.  This is not a prescription, and is not used to certify that the patient has a qualifying medical condition.)       methocarbamol (ROBAXIN) 500 MG tablet TAKE 1 TABLET BY MOUTH 3 TIMES A DAY       multivitamin, therapeutic with minerals (MULTI-VITAMIN) TABS Take 1 tablet by mouth daily.       Omega-3 Fatty Acids (OMEGA-3 FISH OIL PO) Take 1,200 g by mouth every 7 days        pantoprazole (PROTONIX) 40 MG EC tablet TAKE 1 TABLET BY MOUTH TWICE A DAY BEFORE MEALS       pravastatin (PRAVACHOL) 40 MG tablet Take 40 mg by mouth       pregabalin (LYRICA) 100 MG capsule Take 100 mg by mouth daily       zolpidem (AMBIEN) 5 MG tablet Take 5 mg by mouth           PHYSICAL EXAM     Vital signs  BP (!) 143/76   Pulse 76   Ht 1.702 m (5' 7\")   Wt 62.6 kg (138 lb)   BMI 21.61 kg/m      Weight:   138 lbs 0 oz    Carlos Eduardo Cognitive Assessment:    [place] Cognitive Assessment (MOCA)  Visuospatial/Executive : 2  Naming: 3  Attention - Digits: 2  Attention - Letters: " 1  Attention - Subtraction: 3  Language - Repeat: 2  Language - Fluency : 0  Abstraction: 2  Delayed Recall: 2  Orientation: 6  Education: 0  MOCA Score: 23      Carlos Eduardo Cognitive Assessment Score:  MOCA Score: 23/30.     Patient is alert and oriented x4 in no acute distress. Vital signs were reviewed and are documented in electronic medical record. Neck was supple, no carotid bruits, thyromegaly, JVD, or lymphadenopathy was noted.   NEUROLOGY EXAM:    Patient s speech was normal with no aphasia or dysarthria. Mentation, and affect were also normal.     Funduscopic exam was normal, with normal cup to disc ratio. Cranial nerves II -XII were intact.     Patient had normal mass, tone and motor strength was 5/5 in all extremities without pronator drift.     Sensation was intact to light touch, pinprick, and vibratory sensation.     Reflexes were 0 symmetrical with downgoing toes.     No dysmetria noted on FNF or HKS.     Gait testing normal and could not be adequately tested due to right ankle fracture     DIAGNOSTIC STUDIES     PERTINENT RADIOLOGY  Following imaging studies were reviewed:     CT BRAIN 12/17/2020  1.  No acute intracranial process.  2.  Minimal chronic small vessel ischemic disease.     CT LUMBAR SPINE 9/23/2020  1. New but subacute to chronic appearing fractures of L2, including mild  superior endplate compression and incompletely healed bilateral pedicle  fractures, which on the left extend into the posterosuperior vertebral body.  2. Postoperative changes including anterior and posterior instrumented fusion at  L5-S1. No hardware failure. No advanced spinal canal or neural foraminal  narrowing at any level.     MRI, MRA BRAIN 2/26/2021  HEAD MRI:   1.  Mild chronic small vessel ischemic changes and age-related changes.  2.  Otherwise normal brain MRI.    HEAD MRA:   1.  Normal MRA [place].    NECK MRA:  1.  Minimal plaque in the right carotid bulb, but no significant associated stenosis by  NASCET criteria.  2.   Otherwise normal neck MRA.     MRI LUMBAR SPINE 9/23/2020  No new spondylolisthesis since 05/04/2020. Interval subacute  appearing superior L2 compression fracture and bilateral pedicle fractures.  Stable chronic healing fracture through the posterolateral left L3 vertebral  body and pedicle.    NEUROPSYCHOLOGICAL EVALUATION 3/25/2021  No Cognitive Disorder     Adjustment Disorder with Anxiety     RECOMMENDATIONS:  1) Stress management strategies are strongly encouraged, as improvement in emotional distress will lead to perceived improvements in her cognitive functioning (and even her physical pain level to some degree) over time. Such strategies may include:  ? Participating in individual psychotherapy. If the patient is amenable, I will refer her to our psychologist (Dr. Flavia Rivera). I will have our schedulers call her to set up an appointment, or she can always call our clinic at a later time to establish care ([phone]).  ? Consider an adjustment to her medications for her mood/anxiety. She experienced side effects with increased dosing of Cymbalta; as such, consider an alternative medication for anxiety (e.g., a selective serotonin reuptake inhibitor). This may help with her daytime anxiety as well as the heightened anxiety she experiences at night (she fears her sleep medications will make her too sedated to respond to her 's needs, but her anxiety about this keeps her from sleeping and increases her hypervigilance at night as well). This recommendation will be deferred to her PCP.  ? Utilizing relaxation practices. I will mail her a handout with some techniques she may try on her own.  ? Attending caregiving support groups. She may find these through the Alzheimer's Association website or hotline (https://www.alz.org/ or by calling 977-418-3332 ) or through the [employer] Line ([place]://[place].gov/Hi[place]ired-Wallit-line/ or by calling 858-961-9190).     2) In addition, a  referral to a  or  or calling the Senior Linkage Line may be helpful to assist her in seeking out respite services or formal in-home care giving services to allow Ms. De La Rosa to take a break or re-engage in her hobbies and interests.     3) Ms. De La Rosa is also encouraged to follow up with her medical providers for ongoing pain management. Improvements in her pain may help her stress level and cognitive functioning to some degree.     4) Ms. De La Rosa should adhere closely to her medication regimen in order to manage her cerebrovascular risk factors (hyperlipidemia), pain, and mood. This may help to prevent future cognitive decline.     5) The patient is encouraged to remain physically, socially, and mentally active for optimal brain health. This may also help to improve her emotional wellbeing.     6) From a neurocognitive standpoint, I have no concerns about the patient's ability to independently perform IADLs or care for her .      7) Cognitive strategies may be helpful for her own reassurance. These may include utilizing note pads, checklists, to-do lists, alarm reminders, a detailed calendar/planner, a GPS, a pillbox, and maintaining a daily morning and nighttime routine and an organized living environment. She should avoid multi-tasking when possible and should attempt to reduce as much distraction as possible while performing complex or important tasks.     8) Neuropsychological follow-up is not warranted at this time. However, the current test data can now serve as a baseline should a repeat assessment be indicated in the future     PERTINENT LABS  Following labs were reviewed:     Ref Range & Units 1mo ago    Vit E Alpha Study [place] 9.0 - 29.0 mg/L 14    Vit E Gamma Study [place] 0.5 - 4.9 mg/L 1.9      Ref Range & Units 1mo ago    VITAMIN B12 180 - 914 pg/mL 550       Ref Range & Units 1mo ago   TSH 0.35 - 4.94 uIU/mL 1.16       Ref Range & Units 1mo ago   VITAMIN B1 WHL BLD 66.5 - 200.0 nmol/L 97.5       Ref Range & Units 1mo ago    Methylmalonic Acid 0 - 378 nmol/L 293      Ref Range & Units 1mo ago    HOMOCYSTEINE,TOTAL 5.0 - 15.4 umol/L 10.5       Ref Range & Units 1mo ago   FOLIC ACID >=4.0 ng/mL >20.0        Total time spent for face to face visit, reviewing labs/imaging studies, counseling and coordination of care was: 30 Minutes spent on the date of the encounter doing chart review, review of outside records, review of test results, interpretation of tests, patient visit, documentation and discussion with family       This note was dictated using voice recognition software.  Any grammatical or context distortions are unintentional and inherent to the software.             Again, thank you for allowing me to participate in the care of your patient.        Sincerely,        Augustin Terry MD

## 2021-04-12 NOTE — PROGRESS NOTES
NEUROLOGY FOLLOW UP VISIT  NOTE       [facility] NEUROLOGY [place]  [address] [place] [address]  Tel: [phone]  Fax: [phone]  [email or web address]     Leonardo De La Rosa,  1945, MRN [id number]  PCP: Cassie Ortega, None  Date: 2021      ASSESSMENT & PLAN     Diagnosis code  Pseudodementia     Pseudodementia  75-year-old female with sensorineural hearing loss, good, osteoporosis was recently evaluated for progressive cognitive decline.  Although she scored 23/30 on [place] cognitive assessment, formal neuropsychological testing was unremarkable MRI of the brain, MRA and lab work for common causes of cognitive decline was also normal.  She is under a lot of stress due to her 's situation who has dementia but finds it difficult to except that her symptoms could be related to depression and anxiety.  She is already on Cymbalta 40 mg daily and I would recommend switching her to Zoloft or Paxil.  She was confused about the dose of Cymbalta and initially commented that she was taking 300 mg a day which is a fairly high dose and was somewhat surprised to learn that her pharmacy is only filling 40 mg daily.  She will check her home supply and will let us know if she is interested in switching to a different SSRI.  She will also benefit from seeing a psychologist but as noted above she has difficulty excepting that depression and anxiety could be the cause for her symptoms.  Follow-up will be in 2 months    Thank you again for this referral, please feel free to contact me if you have any questions.    Augustin Terry MD  [facility]  (Former[facility], [facility]     HISTORY OF PRESENT ILLNESS     Patient is a 75-year-old female with history of sensorineural hearing loss, osteoporosis, GERD who was seen on 3/4/2021 for progressive cognitive decline with frequent fall and repeating herself at times not recalling  events leading up to her fall.  Her daughters were concerned that patient was having memory difficulty and she tends to get confused easily and fell down few times not recalling how she ended up falling.  This also resulted in right ankle fracture.  She was quite reluctant coming to see a neurologist as she did not want to discuss her medical problems but does admit taking care of her  who has dementia and intracranial hemorrhage takes an emotional toll.  He is high maintenance and due to cultural issues they are having difficulty managing issues they are facing in later part of the area life.  She had MRI of the head and MRA that was unremarkable and also had some lab work that included normal vitamin B12, TSH, vitamin B1, methylmalonic acid level, homocysteine, folic acid and alpha-tocopherol.  She also had had neuropsychological testing done that did not find any cognitive disorder and patient was felt to have adjustment disorder with anxiety.  Since her last visit she reports ongoing difficulty with her memory.  She feels quite overwhelmed taking care of her  Who at times can be quite stubborn.  She is happy to learn that she does not have dementia but has difficulty excepting that her symptoms could be depression or anxiety related.     PROBLEM LIST   Patient Active Problem List   Diagnosis Code     Gastroesophageal reflux disease K21.9     Osteoporosis M81.0     Sensorineural hearing loss, bilateral H90.3     Spinal stenosis M48.00     HLD (hyperlipidemia) E78.5     Pseudodementia R41.89         PAST MEDICAL & SURGICAL HISTORY     Past Medical History:   Patient  has a past medical history of Arthritis, PONV (postoperative nausea and vomiting), Postsurgical arthrodesis status (4/23/2013), and Status post lumbar spinal fusion (2/24/2020).    Surgical History:  She  has a past surgical history that includes TOTAL KNEE ARTHROPLASTY; Foot surgery; Hysterectomy total abdominal, bilateral  "salpingo-oophorectomy, combined; Arthrodesis foot (4/22/2013); and Bunionectomy lapidus with tarsal metatarsal (TMT) fusion (4/22/2013).     SOCIAL HISTORY     Reviewed, and she  reports that she has never smoked. She has never used smokeless tobacco. She reports that she does not drink alcohol or use drugs.     FAMILY HISTORY     Reviewed, and family history includes Coronary Artery Disease in her mother.     ALLERGIES     Allergies   Allergen Reactions     Estratest H.S. Other (See Comments) and Unknown     Other reaction(s): Other (see comments)  Increase lipids  Outside source did not list reaction  Increase lipids       Propofol Nausea and Vomiting     Outside source states \"injectable and inhaled\"  Outside source states \"injectable and inhaled\"  Outside source states \"injectable and inhaled\"  Outside source states \"injectable and inhaled\"       Reglan      Estrogens Rash     Other reaction(s): Other (see comments)  Outside source did not list reaction  Estrogen patches, from patch only, ( Adhesive )  does tolerate estrogen other forms        Penicillins Nausea and Vomiting and Nausea     Other reaction(s): GI intolerance, GI Upset  Patient states upset stomach.  Patient states upset stomach.  Patient states upset stomach.  Patient states upset stomach.           REVIEW OF SYSTEMS     A 12 point review of system was performed and was negative except as outlined in the history of present illness.     HOME MEDICATIONS     Current Outpatient Rx   Medication Sig Dispense Refill     acetaminophen (TYLENOL) 325 MG tablet Take 3 tablets by mouth every 8 hours. 100 tablet 0     ASPIRIN EC PO Take 81 mg by mouth daily.       calcium carbonate-vitamin D (CALCIUM + D) 600-200 MG-UNIT TABS Take 1 tablet by mouth daily.       Celecoxib (CELEBREX PO) Take 200 mg by mouth daily.       Chlorpheniramine-Phenylephrine 4-10 MG TABS        Cholecalciferol (VITAMIN D3 PO) Take 1,000 Units by mouth daily.       denosumab (PROLIA) 60 " "MG/ML SOSY injection Inject 60 mg Subcutaneous once       DULoxetine HCl 40 MG CPEP TAKE 1 CAPSULE BY MOUTH EVERY DAY       famotidine (PEPCID) 40 MG tablet TAKE 1 TABLET BY MOUTH TWICE A DAY       fluocin-hydroquinone-tretinoin (TRI-RODNEY) 0.01-4-0.05 % CREA Externally apply topically At Bedtime 30 g 3     fluticasone (FLONASE) 50 MCG/ACT nasal spray INSTILL 1 SPRAY INTO EACH NOSTRIL ONCE DAILY AS NEEDED FOR RHINITIS       glucosamine-chondroitin 500-400 MG CAPS Take 1 capsule by mouth daily.       losartan (COZAAR) 100 MG tablet Take 100 mg by mouth       medical cannabis (Patient's own supply) See Admin Instructions (The purpose of this order is to document that the patient reports taking medical cannabis.  This is not a prescription, and is not used to certify that the patient has a qualifying medical condition.)       methocarbamol (ROBAXIN) 500 MG tablet TAKE 1 TABLET BY MOUTH 3 TIMES A DAY       multivitamin, therapeutic with minerals (MULTI-VITAMIN) TABS Take 1 tablet by mouth daily.       Omega-3 Fatty Acids (OMEGA-3 FISH OIL PO) Take 1,200 g by mouth every 7 days        pantoprazole (PROTONIX) 40 MG EC tablet TAKE 1 TABLET BY MOUTH TWICE A DAY BEFORE MEALS       pravastatin (PRAVACHOL) 40 MG tablet Take 40 mg by mouth       pregabalin (LYRICA) 100 MG capsule Take 100 mg by mouth daily       zolpidem (AMBIEN) 5 MG tablet Take 5 mg by mouth           PHYSICAL EXAM     Vital signs  BP (!) 143/76   Pulse 76   Ht 1.702 m (5' 7\")   Wt 62.6 kg (138 lb)   BMI 21.61 kg/m      Weight:   138 lbs 0 oz    [place] Cognitive Assessment:    [facility] [facility] Cognitive Assessment (MOCA)  Visuospatial/Executive : 2  Naming: 3  Attention - Digits: 2  Attention - Letters: 1  Attention - Subtraction: 3  Language - Repeat: 2  Language - Fluency : 0  Abstraction: 2  Delayed Recall: 2  Orientation: 6  Education: 0  MOCA Score: 23      [place] Cognitive Assessment Score:  MOCA Score: 23/30.     Patient is alert and oriented x4 in no " acute distress. Vital signs were reviewed and are documented in electronic medical record. Neck was supple, no carotid bruits, thyromegaly, JVD, or lymphadenopathy was noted.   NEUROLOGY EXAM:    Patient s speech was normal with no aphasia or dysarthria. Mentation, and affect were also normal.     Funduscopic exam was normal, with normal cup to disc ratio. Cranial nerves II -XII were intact.     Patient had normal mass, tone and motor strength was 5/5 in all extremities without pronator drift.     Sensation was intact to light touch, pinprick, and vibratory sensation.     Reflexes were 0 symmetrical with downgoing toes.     No dysmetria noted on FNF or HKS.     Gait testing normal and could not be adequately tested due to right ankle fracture     DIAGNOSTIC STUDIES     PERTINENT RADIOLOGY  Following imaging studies were reviewed:     CT BRAIN 12/17/2020  1.  No acute intracranial process.  2.  Minimal chronic small vessel ischemic disease.     CT LUMBAR SPINE 9/23/2020  1. New but subacute to chronic appearing fractures of L2, including mild  superior endplate compression and incompletely healed bilateral pedicle  fractures, which on the left extend into the posterosuperior vertebral body.  2. Postoperative changes including anterior and posterior instrumented fusion at  L5-S1. No hardware failure. No advanced spinal canal or neural foraminal  narrowing at any level.     MRI, MRA BRAIN 2/26/2021  HEAD MRI:   1.  Mild chronic small vessel ischemic changes and age-related changes.  2.  Otherwise normal brain MRI.    HEAD MRA:   1.  Normal MRA [place].    NECK MRA:  1.  Minimal plaque in the right carotid bulb, but no significant associated stenosis by NASCET criteria.  2.   Otherwise normal neck MRA.     MRI LUMBAR SPINE 9/23/2020  No new spondylolisthesis since 05/04/2020. Interval subacute  appearing superior L2 compression fracture and bilateral pedicle fractures.  Stable chronic healing fracture through  the posterolateral left L3 vertebral  body and pedicle.    NEUROPSYCHOLOGICAL EVALUATION 3/25/2021  No Cognitive Disorder     Adjustment Disorder with Anxiety     RECOMMENDATIONS:  1) Stress management strategies are strongly encouraged, as improvement in emotional distress will lead to perceived improvements in her cognitive functioning (and even her physical pain level to some degree) over time. Such strategies may include:  Participating in individual psychotherapy. If the patient is amenable, I will refer her to our psychologist (Dr. Flavia Rivera). I will have our schedulers call her to set up an appointment, or she can always call our clinic at a later time to establish care ([phone]).  Consider an adjustment to her medications for her mood/anxiety. She experienced side effects with increased dosing of Cymbalta; as such, consider an alternative medication for anxiety (e.g., a selective serotonin reuptake inhibitor). This may help with her daytime anxiety as well as the heightened anxiety she experiences at night (she fears her sleep medications will make her too sedated to respond to her 's needs, but her anxiety about this keeps her from sleeping and increases her hypervigilance at night as well). This recommendation will be deferred to her PCP.  Utilizing relaxation practices. I will mail her a handout with some techniques she may try on her own.  Attending caregiving support groups. She may find these through the Alzheimer's Association website or hotline (https://www.alz.org/ or by calling 742-480-9875 ) or through the [employer] Line (https://[place].gov/[employer]-line/ or by calling 014-783-8424).     2) In addition, a referral to a  or  or calling the [employer] Line may be helpful to assist her in seeking out respite services or formal in-home care giving services to allow Ms. De La Rosa to take a break or re-engage in her hobbies and interests.     3) Ms. De La Rosa is  also encouraged to follow up with her medical providers for ongoing pain management. Improvements in her pain may help her stress level and cognitive functioning to some degree.     4) Ms. De La Rosa should adhere closely to her medication regimen in order to manage her cerebrovascular risk factors (hyperlipidemia), pain, and mood. This may help to prevent future cognitive decline.     5) The patient is encouraged to remain physically, socially, and mentally active for optimal brain health. This may also help to improve her emotional wellbeing.     6) From a neurocognitive standpoint, I have no concerns about the patient's ability to independently perform IADLs or care for her .      7) Cognitive strategies may be helpful for her own reassurance. These may include utilizing note pads, checklists, to-do lists, alarm reminders, a detailed calendar/planner, a GPS, a pillbox, and maintaining a daily morning and nighttime routine and an organized living environment. She should avoid multi-tasking when possible and should attempt to reduce as much distraction as possible while performing complex or important tasks.     8) Neuropsychological follow-up is not warranted at this time. However, the current test data can now serve as a baseline should a repeat assessment be indicated in the future     PERTINENT LABS  Following labs were reviewed:     Ref Range & Units 1mo ago    Vit E Alpha [place] 9.0 - 29.0 mg/L 14    Vit E Gamma [place] 0.5 - 4.9 mg/L 1.9      Ref Range & Units 1mo ago    VITAMIN B12 180 - 914 pg/mL 550       Ref Range & Units 1mo ago   TSH 0.35 - 4.94 uIU/mL 1.16       Ref Range & Units 1mo ago   VITAMIN B1 WHL BLD 66.5 - 200.0 nmol/L 97.5      Ref Range & Units 1mo ago    Methylmalonic Acid 0 - 378 nmol/L 293      Ref Range & Units 1mo ago    HOMOCYSTEINE,TOTAL 5.0 - 15.4 umol/L 10.5       Ref Range & Units 1mo ago   FOLIC ACID >=4.0 ng/mL >20.0        Total time spent for face to face visit, reviewing  labs/imaging studies, counseling and coordination of care was: 30 Minutes spent on the date of the encounter doing chart review, review of outside records, review of test results, interpretation of tests, patient visit, documentation and discussion with family     This note was dictated using voice recognition software.  Any grammatical or context distortions are unintentional and inherent to the software.

## 2021-04-13 ENCOUNTER — HOSPITAL ENCOUNTER (OUTPATIENT)
Dept: PALLIATIVE MEDICINE | Facility: OTHER | Age: 76
Discharge: HOME | End: 2021-04-13

## 2021-04-13 DIAGNOSIS — M54.41 CHRONIC BILATERAL LOW BACK PAIN WITH BILATERAL SCIATICA: ICD-10-CM

## 2021-04-13 DIAGNOSIS — M54.42 CHRONIC BILATERAL LOW BACK PAIN WITH BILATERAL SCIATICA: ICD-10-CM

## 2021-04-13 DIAGNOSIS — G89.29 CHRONIC BILATERAL LOW BACK PAIN WITH BILATERAL SCIATICA: ICD-10-CM

## 2021-04-27 ENCOUNTER — HOSPITAL ENCOUNTER (OUTPATIENT)
Dept: PALLIATIVE MEDICINE | Facility: OTHER | Age: 76
Discharge: HOME | End: 2021-04-27

## 2021-04-27 DIAGNOSIS — G89.4 CHRONIC PAIN SYNDROME: ICD-10-CM

## 2021-04-27 DIAGNOSIS — M54.41 CHRONIC BILATERAL LOW BACK PAIN WITH BILATERAL SCIATICA: ICD-10-CM

## 2021-04-27 DIAGNOSIS — G89.29 CHRONIC BILATERAL LOW BACK PAIN WITH BILATERAL SCIATICA: ICD-10-CM

## 2021-04-27 DIAGNOSIS — M54.42 CHRONIC BILATERAL LOW BACK PAIN WITH BILATERAL SCIATICA: ICD-10-CM

## 2021-05-11 ENCOUNTER — HOSPITAL ENCOUNTER (OUTPATIENT)
Dept: PALLIATIVE MEDICINE | Facility: OTHER | Age: 76
Discharge: HOME | End: 2021-05-11
Payer: MEDICARE

## 2021-05-11 DIAGNOSIS — M54.50 CHRONIC BILATERAL LOW BACK PAIN WITHOUT SCIATICA: ICD-10-CM

## 2021-05-11 DIAGNOSIS — G89.29 CHRONIC BILATERAL LOW BACK PAIN WITHOUT SCIATICA: ICD-10-CM

## 2021-06-02 ENCOUNTER — RECORDS - HEALTHEAST (OUTPATIENT)
Dept: ADMINISTRATIVE | Facility: CLINIC | Age: 76
End: 2021-06-02

## 2021-06-10 NOTE — PROGRESS NOTES
ACUPUNCTURIST TREATMENT NOTE    Name: Leonardo De La Rosa  :  1945  MRN:  [id number]      Acupuncture Treatment  Patient Type: JN Pain  Intervention Reason: Pain;Anxiety/Stress  Pre-session Pain Ratin  Post-session Pain Ratin  Patient complaint:: Patient is seen for first treatment for chronic low back pain. Patient with history of L5/S1 decompression and fusion 19.  She reports since her surgery she has had continued pain in low back in SI joint area bilateral. She has been told the pain is being caused by the placement of the hardware, and patient does not want to pursue additional surgical interventions to move or replace hardware. Upon palpation she is very sore and tender bilaterally from L4-S2. Patient also with arthritis in several joints and this contributes to her pain as well, especially in bilateral hips. Patient does walk every day about 1-1.5 miles and feels generally good while she is walking. Pain increases with sitting, standing, or laying still. She has a very difficult time sleeping due to the pain. 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 Patient must physically exert herself to take care of her  at home, doing a lot of lifting and majority of all housework. She does do yoga and meditation each morning along with her walks to help manage stress and anxiety. She also notes left rotator cuff tear, which she tells me is 3cm. She will have pain in left arm radiating down entire arm. Patient also complains of dry cough that comes on mainly at night, but can appear during the day. She will have a fit of coughing that can wake her from sleep. She has tried allergy medication with no improvement. She does drink a good amount of water as well. Endorses dry feeling in mouth and throat.  Acupuncture (Points):: Baihui, [place], HTJJ L2-L5, Im17-Nk09, Shiqizhuixia, Yo03-Hq87, Gb29, Sp6, Ki3, Ub62. Left: Gb21, Jianqian, Li15, Tb14, Li14, Ht7, Si3  TCM  "Diagnosis: Qi and Blood stasis due to trauma, Kidney and Lung yin deficiency  Practitioner Observed: 30 minute treatment. Infrared heat lamp over low back. Gentle tuina with posumon to low back.  Treatment Plan/Follow up: Patient to continue 1-2 treatments per week for 4-6 weeks, then re-evaluate.  Checklist: Progress Note Completed;Consent Reveiwed  Follow up comments: Patient relaxed well during treatment. Discussed loquat leaf for dry cough.    \"Risks and benefits of acupuncture were discussed with patient. Consent for treatment was given. We thank you for the referral.\"     Juliann Bennett L.Ac.    Date:  8/18/2020  Time:  10:30 AM    "

## 2021-06-10 NOTE — TELEPHONE ENCOUNTER
"Patient in clinic for acupuncture, acupuncturist Juliann Donald requested that an RN check on her patient as she was not feeling well. Patient reports feeling, diaphoretic,  nauseous and \"buzzy inside\". Vital signs checked, blood pressure elevated, heart rate normal, oxygen saturation 100% on room air. Patient denies history of hypertension, started on new antibiotic which she took on an empty stomach this morning. No complaints of chest, arm or jaw pain. No shortness of breath. Patient denies having contact with anyone diagnosed with COVID 19.  Julienne CHEN assessed patient and patient then transported to ED for evaluation.  "

## 2021-06-10 NOTE — PROGRESS NOTES
ACUPUNCTURIST TREATMENT NOTE    Name: Leonardo De La Rosa  :  1945  MRN:  [id number]      Acupuncture Treatment  Patient Type: JN Pain  Intervention Reason: Pain;Anxiety/Stress  Pre-session Pain Ratin  Patient complaint:: Patient is seen for second treatment for chronic low back pain. Patient reports after last treatment pain started to return relatively quickly post-treatment later in the day. Over the past few days she has felt increase in pain with sharp pain in left side low back where hardware is, an achiness around entire low back and hips, muscle aches and weakness in bilateral legs, and pain in left shoulder. Pain levels rating 7/10 today. She blieves she may be having increased muscle aches due to side effect of lipitor - she plans to discuss this with her prescribing provider. Continues with very poor sleep due to pain and anxiety. Anxiety continues to be very high due to being caretaker for . No rating provided for anxiety/stress.  Acupuncture (Points):: Baihui, [place], HTJJ L2-L5, Ub23, [place], Shiqizhuixia, Gb34, Sp6, Ub57, Ki3, Ub62, Si3, Li4, Ht7. Left: Jianqian, Li15, Tb14, Si10, Si9, Li14, Wr40-Kn45, Dv96-Pp97, Gb29  E-Stim: micro current 2 x 100hz (L) HTJJ L2-L5, Ox38-Qm68  TCM Diagnosis: Qi and Blood stasis due to trauma, Kidney and Lung yin deficiency   Practitioner Observed: 15 minute treatment total. Patient rested with needles for about 7 minutes and called provider to room due to feeling hot. Infrared heat lamp was removed from patient, as well as table warmer turned off. Patient then began to feel slightly nauseated. Needles removed from patient, patient in seated position, water provided. Patient slightly diaphoretic. Vitals were checked and BP was found to be 204/104. Oxygen saturation and heart rate normal. No tingling, numbness pain or confusion noted. After resting 30 minutes BP did not lower, so patient was taken to ED at [facility] for further exam.   Treatment  "Plan/Follow up: Patient to continue acupuncture weekly as able.  Checklist: Progress Note Completed;Consent Reveiwed    \"Risks and benefits of acupuncture were discussed with patient. Consent for treatment was given. We thank you for the referral.\"     Juliann Bennett L.Ac.    Date:  8/25/2020  Time:  1:50 PM      "

## 2021-06-10 NOTE — TELEPHONE ENCOUNTER
Phone call placed to Dr. Zaragoza regarding patient's elevated BP at acupuncture  She was assessed by myself, Mark Edge RN and Trish Villa RN  Vitals:  1200 /104 HR 86 bpm, O2 100% room air  1210 /101, HR 78 bpm, O2 100% room air  1220 /95, HR 79 bpm, O2 100%    Per RN, patient ate 1 piece of toast this morning, did take a new antibiotic and feels slightly nauseated. Denies shortness of breath, dizziness, headache, chest pain, sweating.    No underlying co-morbidities or medications listed in chart.   Transporting to ED with RN and security for assessment.

## 2021-06-11 NOTE — PROGRESS NOTES
"ACUPUNCTURIST TREATMENT NOTE    Name: Leonardo De La Rosa  :  1945  MRN:  [id number]      Acupuncture Treatment  Patient Type: JN Pain  Intervention Reason: Anxiety/Stress;Pain;Pain 2  Pre-session Pain Ratin  Pre-session Pain 2 Ratin  Patient complaint:: Patient is seen for third treatment today. After last treatment patient was sent to ED to check on high blood pressure. According to ED: \"EKG and laboratory work-up, imaging were unremarkable.  Blood pressure normalized here without treatment.  I am unclear whether patient actually has high blood pressure and I think that this might be a whitecoat hypertension based on her history.  She was encouraged to monitor her blood pressure once daily and follow-up with primary care provider if she is having elevated readings for institution of antihypertensives.\" Patient reports she has been working on anxiety/stress and trying to not over work herself. Low back pain is reported to be about the same. Today her pain in low back is rating 5/10, and the last few days she notes more pain on her right side low back. Generally her pain is more intense on the left side. Continues as well with left sided shoulder pain, today rating 7/10. She thinks she slept on her shoulder and this has increased the pain today. She started antibiotics for the dry hacking cough at night time, and this has improved her cough. She continues to feel dry mouth and throat, although she does stay well-hydrated. Continues to have high anxiety and stress, does not provide rating for this.  Acupuncture (Points):: Baihui, [place], HTJJ L2-L5, Ub23, Shiqizhuixia, Yaoyan, Nw57-Cr25v Gb34, Ub57, Sp6, Ki6, Ub62, Ub60. Left: Jianqian, Li15, Tb14, Si10, Li14. Right: Ub53, Ub54, Gb29  TCM Diagnosis: Qi and Blood stasis due to trauma, Kidney and Lung yin deficiency   Practitioner Observed: 30 minute treatment. Infrared heat lamp over low back. Gentle tuina with posumon to low back.  Treatment " "Plan/Follow up: Discussed some gentle stretching/ROM movements for back.  Checklist: Progress Note Completed;Consent Reveiwed  Follow up comments: Patient relaxed well during treatment. Did not provide follow up scores.    \"Risks and benefits of acupuncture were discussed with patient. Consent for treatment was given. We thank you for the referral.\"     Juliann Bennett L.Ac.    Date:  9/1/2020  Time:  1:28 PM    "

## 2021-06-11 NOTE — PROGRESS NOTES
"ACUPUNCTURIST TREATMENT NOTE    Name: Leonardo De La Rosa  :  1945  MRN:  [id number]      Acupuncture Treatment  Patient Type: JN Pain  Intervention Reason: Pain;Pain 2;Anxiety/Stress  Pre-session Pain Ratin  Pre-session Pain 2 Ratin  Patient complaint:: Patient is seen for 4th treatment today for chronic low back pain. Patient reports feeling good after last acupuncture treatment, but later that day pain returned in low back. Pain continues to now be more midline and radiating into right side in low back. PAin rating 5/10 today. Will have more pain when standing up from a sitting position. Continues as well with left shoulder pain which remains the same. Also continues with cough at night, although antibiotics have helped somewhat. She is almost done with course of antibiotics. Patient plans for functional scopic sinus surgery to remove the debris that is within her frontal and sphenoid sinuses. Patient also continues with high stress/anxiety, although she is trying to work on this. She has been practicing not finishing all tasks right away and letting herself feel relaxed about not finishing everything herself right away. She realizes her high stress and anxiety are impacting her health poorly.  Acupuncture (Points):: Baihui, Gb20, Lu7, Ht7, Si3, HTJJ L2-L5, Xo23-Ag22, Qj63-Kb04, Shiqizhuixia, Yaoyan, Gb29, Ub40, Gb34, Sp6, Ub62. 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 Infrared heat lamp over low back. Gentle tuina with joe larios to low back. Tiger Balm patch applied to right side low back.  Treatment Plan/Follow up: Provided article on anti-inflammatory foods.  Checklist: Progress Note Completed;Consent Reveiwed  Follow up comments: Patient relaxed well during treatment. Unable to provide follow up score post-treatment.    \"Risks and benefits of " "acupuncture were discussed with patient. Consent for treatment was given. We thank you for the referral.\"     Juliann Bennett L.Ac.    Date:  9/3/2020  Time:  10:52 AM    "

## 2021-06-11 NOTE — PROGRESS NOTES
"ACUPUNCTURIST TREATMENT NOTE    Name: Leonardo De La Rosa  :  1945  MRN:  [id number]      Acupuncture Treatment  Patient Type: JN Pain  Intervention Reason: Pain;Pain 2  Pre-session Pain Ratin  Pre-session Pain 2 Rating: 10  Patient complaint:: Patient is seen for 5th treatment today for chronic low back pain. Patient reports she continues to have high pain in low back, however, pain remains now on right side of low back and she is not having any pain on the left side, which has been chronically her worst pain. Pain is located just lateral to S1-S3 area off sacrum. Patient is also tender upon palpation lateral to L4/L5 on right side. Pain is more into gluteal area than low back when patient points to where she feels pain. Denies any pain radiating down leg. Patient also reports continued high pain in left posterior shoulder, rating 10/10 today. Patient also continues with high stress, although she is trying to be conscious of this and work on meditation and calming herself. She notes she has been put on some medicatio for blood pressure since she has been monitoring at home and bp continues to be elevated.  Acupuncture (Points):: Baihui, [place], Lu7, Si3, HTJJ L2-L5, Shiqizhuixia, Ub23, Yaoyan, Gb34, Ub57, Sp6, Ub60, Ub62. Left: Tb14, Li15, Li14, Si10, Si9, Si11, Gb21. 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 Infrared heat lamp over low back. Tuina with posumon to low back, upper back, (L) shoulder.  Checklist: Progress Note Completed;Consent Reveiwed  Follow up comments: Patient relaxed well during treatment. Unable to provide follow up score post-treatment.    \"Risks and benefits of acupuncture were discussed with patient. Consent for treatment was given. We thank you for the referral.\"     Juliann Bennett L.Ac.    Date:  2020  Time:  1:35 PM    "

## 2021-06-16 NOTE — PROGRESS NOTES
"NEUROPSYCHOLOGY TELE-HEALTH FEEDBACK VISIT  [facility]    Telephone Visit  Leonardo De La Rosa is a 75 y.o. female who is being evaluated via a billable telephone visit.     The patient has been notified of the following:      \"This telephone visit will be conducted via a call between you and your provider. We have found that certain health care needs can be provided without the need for a physical exam.  This service lets us provide the care you need with a phone conversation. If during the course of the call the provider feels a telephone visit is not appropriate, you will not be charged for this service.\"     Patient has given verbal consent to a Telephone visit? Yes  Consent has been obtained for this service by 1 care team member: yes.    Visit Summary  The purpose of today s appointment was to provide feedback regarding Ms. De La Rosa recent neuropsychological telehealth consult completed on 3/25/2021. We began the session by discussing her experience during the evaluation. I provided Ms. De La Rosa with detailed feedback regarding her performance on cognitive testing and her pattern of cognitive strengths and weaknesses.  I discussed my overall impressions and recommendations, She is agreeable to a referral to see our psychologist and would like to discuss an anxiety medicaiton with her PCP. She has an appointment with her PCP this afternoon. I will route my report to her (with patient's verbal permission) with a message about my recommendation. I provided the opportunity for Ms. De La Rosa to ask any questions that she had about the evaluation. At the end of the session, she indicated that she understood the results and that I had answered all of her questions.       Lena Mike PsyD, LP  Licensed Clinical Neuropsychologist  [facility] [place]note 2[place] Professional [facility]  [address], [address]  [place] [address]  Phone: [phone]    Telephone " Visit Details    Type of service:  Telephone Visit  Start Time: 9:00 AM  End Time: 9:51 AM    Originating Location (pt. Location): Home    Distant Location (provider location):  Remote work for [facility] ([facility])    Mode of Communication:  Telephone    Leonardo De La Rosa was evaluated via a billable telephone visit. For diagnostic and coding purposes, Ms. 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 As this is the final date for this Episode of Care (initiated on 3/25/2021) all charges for the entire Episode of Care will be filed today. Please see the 3/25/2021 evaluation for a detailed description of codes and services, including services provided today.      In brief:   1 x 96116  1 x 96132  2 x 96133  1 x 96138  1 x 96139

## 2021-06-16 NOTE — PATIENT INSTRUCTIONS - HE
SUMMARY OF FINDINGS:   Due to the current COVID-19 pandemic that prevents in-person clinical visits, this assessment was conducted using telehealth methods. The standard administration of these tests involves in-person, face-to-face methods. The full impact of applying non-standard administration methods via remote technology is not fully appreciated at this time. As such, the diagnostic conclusions and recommendations for treatment provided in this report are being advanced with caution.    With these limitations in mind, results of testing indicate that Ms. De La Rosa is of estimated average premorbid intellectual functioning, and nearly all of Ms. De La Rosa's performances are generally commensurate with that estimate. The only exception is some variability that is noted on tests of processing speed, with scores ranging from exceptionally low to average. Phonemic fluency is exceptionally low as well, although this is likely secondary to variable processing speed as all other frontal lobe functions are intact. All other cognitive domains assessed are considered to be within normal limits, including attention/concentration, verbal learning/memory, cognitive flexibility/set-shifting, and all other language abilities.     Please note, some cognitive domains are unable to be assessed via telephone, including visuospatial/constructional abilities, nonverbal learning/memory, fine motor speed/dexterity, and some other executive functions.    Emotionally, the patient endorses moderate anxiety and minimal depressive symptoms on self-report questionnaires. This is fairly consistent with her reports during the clinical interview. She acknowledges feeling incredibly stressed and overwhelmed by her role as a caregiver for her , who has dementia secondary to a significant CVA he sustained in 2017. He subsequently relies on Ms. De La Rosa for all of his cares. Ms. De La Rosa has sacrificed all of her personal time and hobbies in order to  care for her . In addition, their daughter is now living with them and she tends to argue with her father, which is very hard on Ms. De La Rosa. Current suicidal ideation is denied.    IMPRESSIONS:    Test results are essentially within normal limits across all domains assessed, with the exception of variability in processing speed.    There is no compelling evidence of a cognitive disorder in the current profile.    Rather, I suspect that a combination of non-neurologic factors are contributing to her experience of cognitive impairment in daily life.    First, her significant stress and anxiety may certainly interfere with her processing speed, concentration, and other cognitive functions in daily life.     Second, Ms. De La Rosa struggles with chronic pain, which can negatively affect cognition.    Ms. De La Rosa noted that when her stress increases, her cognition worsens and her pain increases. These areas (emotional distress, physical symptoms/pain, and cognition) are all inter-connected, so worsening in one area will likely elicit worsening in the others.    That said, if her stress (or pain) improves, she will likely experience improvement in the other areas, at least to some degree.     Overall, Ms. De La Rosa can be reassured that there is no evidence of cerebral dysfunction in the current profile.    DIAGNOSIS:  No Cognitive Disorder    Adjustment Disorder with Anxiety    RECOMMENDATIONS:  1) Stress management strategies are strongly encouraged, as improvement in emotional distress will lead to perceived improvements in her cognitive functioning (and even her physical pain level to some degree) over time. Such strategies may include:    Participating in individual psychotherapy. If the patient is amenable, I will refer her to our psychologist (Dr. Flavia Rivera). I will have our schedulers call her to set up an appointment, or she can always call our clinic at a later time to establish care ([phone]).    Consider an  adjustment to her medications for her mood/anxiety. She experienced side effects with increased dosing of Cymbalta; as such, consider an alternative medication for anxiety (e.g., a selective serotonin reuptake inhibitor). This may help with her daytime anxiety as well as the heightened anxiety she experiences at night (she fears her sleep medications will make her too sedated to respond to her 's needs, but her anxiety about this keeps her from sleeping and increases her hypervigilance at night as well). This recommendation will be deferred to her PCP.    Utilizing relaxation practices. I will mail her a handout with some techniques she may try on her own.    Attending caregiving support groups. She may find these through the Alzheimer[employer] website or hotline (https://www.alz.org/ or by calling 146-268-0821 ) or through the [employer] ([place]://[place].gov[place]/ or by calling 825-499-6824).    2) In addition, a referral to a  or  or calling the [employer] may be helpful to assist her in seeking out respite services or formal in-home care giving services to allow Ms. De La Rosa to take a break or re-engage in her hobbies and interests.    3) Ms. De La Rosa is also encouraged to follow up with her medical providers for ongoing pain management. Improvements in her pain may help her stress level and cognitive functioning to some degree.    4) Ms. De La Rosa should adhere closely to her medication regimen in order to manage her cerebrovascular risk factors (hyperlipidemia), pain, and mood. This may help to prevent future cognitive decline.    5) The patient is encouraged to remain physically, socially, and mentally active for optimal brain health. This may also help to improve her emotional wellbeing.    6) From a neurocognitive standpoint, I have no concerns about the patient's ability to independently perform IADLs or care for her .     7) Cognitive strategies  may be helpful for her own reassurance. These may include utilizing note pads, checklists, to-do lists, alarm reminders, a detailed calendar/planner, a GPS, a pillbox, and maintaining a daily morning and nighttime routine and an organized living environment. She should avoid multi-tasking when possible and should attempt to reduce as much distraction as possible while performing complex or important tasks.    8) Neuropsychological follow-up is not warranted at this time. However, the current test data can now serve as a baseline should a repeat assessment be indicated in the future.      Lena Mike PsyD, LP  Licensed Clinical Neuropsychologist  [facility] [address], [address]  [place] [address]  Phone: [phone]      Relaxation Techniques    Search on YouTube and follow along with the videos:  1) Diaphragmatic (deep) Breathing  2) Progressive Muscle Relaxation  3) Guided Imagery/Visualization  4) Meditation  5) Mindfulness activities    Free Relaxation Apps (5-10 minute guided relaxation audios/videos):  1) Calm  2) Head Space  3) Virtual Hope Box    Other Relaxing Activities:  1) Get some fresh air  2) Exercise/go for a walk  3) Take a warm bath  4) Adult coloring books  5) Relaxing music  6) Other activities you enjoy...

## 2021-06-16 NOTE — PROGRESS NOTES
The patient was seen for a neuropsychological evaluation for the purposes of diagnostic clarification and treatment planning. 50 minutes of telehealth testing were provided by this writer. An additional 13 minutes were spent scoring and compiling test results.The patient was cooperative with testing. No concerns were brought to my attention. Please see Dr. Mike's report for a detailed description of the charges and interpretation and integration of the findings.    Testing start: 1535 (3/25)   Testing stop: 1625 (3/25)  Scoring start: 0940 (3/26)  Scoring Stop: 0953 (3/26)

## 2021-06-16 NOTE — PROGRESS NOTES
"  NEUROPSYCHOLOGY TELEHEALTH EVALUATION  [facility]    NAME: Leonardo De La Rosa    YOB: 1945   AGE: 75 y.o.  EDU: 13  DATE OF EVALUATION: 4/5/2021     Telephone Visit  Leonardo De La Rosa is a 75 y.o. female who is being evaluated via a billable telephone visit in light of the ongoing global health crisis (COVID-19) that requires us to abide by social distancing mandates in order to reduce the risk of COVID-19 exposure.    The patient has been notified of following:      \"This telephone visit will be conducted via a call between you and your provider. We have found that certain health care needs can be provided without the need for a physical exam.  This service lets us provide the care you need with a phone conversation. If during the course of the call the provider feels a telephone visit is not appropriate, you will not be charged for this service.\"    In addition, information was provided about the risks, benefits and limitations of participating in the current evaluation via Tele-Health as well more general explanation of the services to be provided today.      Patient has given verbal consent to a Telephone visit? Yes  Consent has been obtained for this service by one care team member: Yes      REASON FOR REFERRAL:  Ms. De La Rosa is a 75 y.o., right-handed,  female with hyperlipidemia, chronic back pain, frequent falls, and sensorineural hearing loss. Due to her concerns about her cognitive functioning over the past 6 months, she was referred for this neuropsychological evaluation by her neurologist, Augustin Terry MD, in order to assist with differential diagnosis and care planning.     SUMMARY OF FINDINGS: (please refer to Extended Report below for full details and comprehensive clinical history)  Due to the current COVID-19 pandemic that prevents in-person clinical visits, this assessment was conducted using telehealth methods. The standard administration of these tests " involves in-person, face-to-face methods. The full impact of applying non-standard administration methods via remote technology is not fully appreciated at this time. As such, the diagnostic conclusions and recommendations for treatment provided in this report are being advanced with caution.    With these limitations in mind, results of testing indicate that Ms. De La Rosa is of estimated average premorbid intellectual functioning, and nearly all of Ms. De La Rosa's performances are generally commensurate with that estimate. The only exception is some variability that is noted on tests of processing speed, with scores ranging from exceptionally low to average. Phonemic fluency is exceptionally low as well, although this is likely secondary to variable processing speed as all other frontal lobe functions are intact. All other cognitive domains assessed are considered to be within normal limits, including attention/concentration, verbal learning/memory, cognitive flexibility/set-shifting, and all other language abilities.     Please note, some cognitive domains are unable to be assessed via telephone, including visuospatial/constructional abilities, nonverbal learning/memory, fine motor speed/dexterity, and some other executive functions.    Emotionally, the patient endorses moderate anxiety and minimal depressive symptoms on self-report questionnaires. This is fairly consistent with her reports during the clinical interview. She acknowledges feeling incredibly stressed and overwhelmed by her role as a caregiver for her , who has dementia secondary to a significant CVA he sustained in 2017. He subsequently relies on Ms. De La Rosa for all of his cares. Ms. De La Rosa has sacrificed all of her personal time and hobbies in order to care for her . In addition, their daughter is now living with them and she tends to argue with her father, which is very hard on Ms. De La Rosa. Current suicidal ideation is  denied.    IMPRESSIONS:    Test results are essentially within normal limits across all domains assessed, with the exception of variability in processing speed.    There is no compelling evidence of a cognitive disorder in the current profile.    Rather, I suspect that a combination of non-neurologic factors are contributing to her experience of cognitive impairment in daily life.    First, her significant stress and anxiety may certainly interfere with her processing speed, concentration, and other cognitive functions in daily life.     Second, Ms. De La Rosa struggles with chronic pain, which can negatively affect cognition.    Ms. De La Rosa noted that when her stress increases, her cognition worsens and her pain increases. These areas (emotional distress, physical symptoms/pain, and cognition) are all inter-connected, so worsening in one area will likely elicit worsening in the others.    That said, if her stress (or pain) improves, she will likely experience improvement in the other areas, at least to some degree.     Overall, Ms. De La Rosa can be reassured that there is no evidence of cerebral dysfunction in the current profile.    DIAGNOSIS:  No Cognitive Disorder    Adjustment Disorder with Anxiety    RECOMMENDATIONS:  1) Stress management strategies are strongly encouraged, as improvement in emotional distress will lead to perceived improvements in her cognitive functioning (and even her physical pain level to some degree) over time. Such strategies may include:    Participating in individual psychotherapy. If the patient is amenable, I will refer her to our psychologist (Dr. Flavia Rivera). I will have our schedulers call her to set up an appointment, or she can always call our clinic at a later time to establish care ([phone]).    Consider an adjustment to her medications for her mood/anxiety. She experienced side effects with increased dosing of Cymbalta; as such, consider an alternative medication for anxiety  (e.g., a selective serotonin reuptake inhibitor). This may help with her daytime anxiety as well as the heightened anxiety she experiences at night (she fears her sleep medications will make her too sedated to respond to her 's needs, but her anxiety about this keeps her from sleeping and increases her hypervigilance at night as well). This recommendation will be deferred to her PCP.    Utilizing relaxation practices. I will mail her a handout with some techniques she may try on her own.    Attending caregiving support groups. She may find these through the Alzheimer's Association website or hotline (https://www.alz.org/ or by calling 594-178-7619 ) or through the [employer] ([place]://[place].gov/Siriona-CBRITE-Diaferon/ or by calling 899-873-4778).    2) In addition, a referral to a  or  or calling the [employer] Line may be helpful to assist her in seeking out respite services or formal in-home care giving services to allow Ms. De La Rosa to take a break or re-engage in her hobbies and interests.    3) Ms. De La Rosa is also encouraged to follow up with her medical providers for ongoing pain management. Improvements in her pain may help her stress level and cognitive functioning to some degree.    4) Ms. De La Rosa should adhere closely to her medication regimen in order to manage her cerebrovascular risk factors (hyperlipidemia), pain, and mood. This may help to prevent future cognitive decline.    5) The patient is encouraged to remain physically, socially, and mentally active for optimal brain health. This may also help to improve her emotional wellbeing.    6) From a neurocognitive standpoint, I have no concerns about the patient's ability to independently perform IADLs or care for her .     7) Cognitive strategies may be helpful for her own reassurance. These may include utilizing note pads, checklists, to-do lists, alarm reminders, a detailed calendar/planner, a GPS, a pillbox, and  "maintaining a daily morning and nighttime routine and an organized living environment. She should avoid multi-tasking when possible and should attempt to reduce as much distraction as possible while performing complex or important tasks.    8) Neuropsychological follow-up is not warranted at this time. However, the current test data can now serve as a baseline should a repeat assessment be indicated in the future.      FEEDBACK OF ASSESSMENT RESULTS:  Ms. De La Rosa has requested to receive the results of this evaluation via a formal feedback appointment with me, which will be scheduled at the patient's convenience, typically within two weeks of today's date.     Thank you for allowing me to participate in Ms. De La Rosa's care. Please contact me with any questions regarding the content of this report.        Lena Mike PsyD, LP  Licensed Clinical Neuropsychologist  [facility] [address], [address]  [place] [address]  Phone: [phone]        --------------------------------EXTENDED REPORT--------------------------------    HISTORY OF PRESENTING PROBLEM:  The following information was obtained via medical record review and by interview of the patient.    Ms. De La Rosa reported experiencing cognitive decline and frequent falls, particularly over the past 6 months. Specifically, with regard to her cognition, the patient stated that she has issues with concentration and her mind goes \"blank\" when she is under stress or feeling overwhelmed. Of note, Ms. De La Rosa is the sole caregiver for her , who suffers from dementia following a major stroke he experienced in 2017. He subsequently relies on Ms. De La Rosa for all of his cares and is described as quite demanding and controlling (a longstanding trait that amplified after his stroke). Their daughter has been living with them temporarily, and she and her father tend to have frequent conflicts. Ms. De La Rosa " "stated that she tends to get lost in her thoughts, and when her daughter and  argue \"I get numb and my mind goes blank.\" She later noted, \"I'm unhappy. And when I'm unhappy, I'm not thinking. I go blank.\"    Ms. De La Rosa has also been falling more frequently, which has resulted in injuries (e.g., ankle fracture, thumb injury). The patient attributed this to \"rushing around\" to care for her  and not paying attention to what is around her. Most of her falls involve tripping over objects (including her own feet). Due to these concerns, her daughter encouraged her to see neurology for further evaluation.     Ms. De La Rosa met with neurologist, Augustin Terry MD, on 3/4/2021. At that time, the patient was administered a brief cognitive screen (MOCA), on which she earned a score of 23/30 (suggesting the presence of cognitive impairment). Her neurologic examination was otherwise normal. Dr. Terry suspected that her cognitive concerns may be secondary to psychosocial stressors, but referred her for a comprehensive work-up (including a brain MRI/MRA, blood work, and this neuropsychological evaluation) in order to rule out other causes. Her brain MRI dated 2/26/2021 revealed mild chronic small vessel ischemic changes and moderate generalized volume loss. There was no evidence of normal pressure hydrocephalus or acute intracranial findings. Brain MRA was unremarkable. All blood work from 3/8/2021 was normal (including folic acid, homocysteine, MMA, TSH, and vitamins B12, B1, and E).    With regard to the activities of daily living, Ms. De La Rosa reported that she is fully independent. She denied having any issues with driving, medication or financial management, cooking, or performing household chores and errands. As noted above, she also manages all of her 's cares.    MEDICAL HISTORY:  Ms. De La Rosa's medical history is significant for hyperlipidemia, chronic back pain, frequent falls (described above), sensorineural " "hearing loss, and GERD. Her current level of pain was rated as a 3 out of 10, which is typical for her. She uses medications, heat pads, stretching, and injections to help manage her pain. She has participated in several different pain clinics. The patient denied any history of significant head injuries, known seizure, stroke, heart attack, tremor, balance issues and microsmia/anosmia. To her knowledge, she has never had COVID-19 and has now received both dosages of the vaccine.    The patient reported that her sleep is usually normal and largely undisturbed with consistent use of Ambien, although some nights she will awaken at 3 AM and is unable to fall back asleep. She estimates that she is typically getting about 7 hours of sleep per night. She noted that she feels \"so tired\" during the day.     Past Surgical History:   Procedure Laterality Date     FOOT SURGERY       HYSTERECTOMY       KNEE SURGERY       Diagnostic studies:  Brain MRI dated 2/26/2021 revealed mild chronic small vessel ischemic changes and moderate generalized volume loss. There was no evidence of normal pressure hydrocephalus or acute intracranial findings. Brain MRA was unremarkable. All blood work from 3/8/2021 was normal (including folic acid, homocysteine, MMA, TSH, and vitamins B12, B1, and E).    Current medications include (per medical record):   Medication Sig Dispensed Refills Start Date End Date Status   Celecoxib (CELEBREX PO)   Take 200 mg by mouth daily.   0     Active   Cholecalciferol (VITAMIN D3 PO)   Take 1,000 Units by mouth daily.   0     Active   calcium carbonate-vitamin D (CALCIUM + D) 600-200 MG-UNIT TABS   Take 1 tablet by mouth daily.   0     Active   multivitamin, therapeutic with minerals (MULTI-VITAMIN) TABS   Take 1 tablet by mouth daily.   0     Active   ASPIRIN EC PO   Take 81 mg by mouth daily.   0     Active   Omega-3 Fatty Acids (OMEGA-3 FISH OIL PO)   Take 1,200 g by mouth every 7 days    0     Active "   glucosamine-chondroitin 500-400 MG CAPS   Take 1 capsule by mouth daily.   0     Active   acetaminophen (TYLENOL) 325 MG tablet    Indications: Pain Take 3 tablets by mouth every 8 hours. 100 tablet   0 04/24/2013   Active   fluocin-hydroquinone-tretinoin (TRI-RODNEY) 0.01-4-0.05 % CREA    Indications: Hyperpigmentation Externally apply topically At Bedtime 30 g   3 06/19/2019   Active   denosumab (PROLIA) 60 MG/ML SOSY injection   Inject 60 mg Subcutaneous once   0     Active   zolpidem (AMBIEN) 5 MG tablet   Take 5 mg by mouth   0 07/14/2020   Active   pregabalin (LYRICA) 100 MG capsule   Take 100 mg by mouth daily   0 07/29/2020   Active   pravastatin (PRAVACHOL) 40 MG tablet   Take 40 mg by mouth   0 07/25/2019   Active   pantoprazole (PROTONIX) 40 MG EC tablet   TAKE 1 TABLET BY MOUTH TWICE A DAY BEFORE MEALS   0 11/07/2020   Active   methocarbamol (ROBAXIN) 500 MG tablet   TAKE 1 TABLET BY MOUTH 3 TIMES A DAY   0 02/17/2021   Active   losartan (COZAAR) 100 MG tablet   Take 100 mg by mouth   0 12/17/2020   Active   fluticasone (FLONASE) 50 MCG/ACT nasal spray   INSTILL 1 SPRAY INTO EACH NOSTRIL ONCE DAILY AS NEEDED FOR RHINITIS   0 10/22/2020   Active   famotidine (PEPCID) 40 MG tablet   TAKE 1 TABLET BY MOUTH TWICE A DAY   0 12/16/2020   Active   DULoxetine HCl 40 MG CPEP   TAKE 1 CAPSULE BY MOUTH EVERY DAY   0 10/16/2020   Active   medical cannabis (Patient's own supply)   See Admin Instructions (The purpose of this order is to document that the patient reports taking medical cannabis. This is not a prescription, and is not used to certify that the patient has a qualifying medical condition.)   0     Active   Chlorpheniramine-Phenylephrine 4-10 MG TABS       0     Active     RELEVANT FAMILY MEDICAL HISTORY:   Family neurologic history is unremarkable, with no known family history of dementia.     PSYCHIATRIC HISTORY:  With regard to her psychiatric history, Ms. De La Rosa endorsed a history of anxiety and  "depressed mood ever since her 's stroke in 2017 when her stress level increased. In addition, their daughter now lives with them and she and her  do not get along very well. This has caused increased stress within the home. Currently, the patient described her mood as \"unhappy\" and she reported that she often gets sad and wonders what she did to deserve her current life. With all of the stress that is present, she stated that it feels as though there is always a \"pressure on my chest.\" She also noted that she is a very private person, and does not talk much about her stressors to others. To that end, she is also not interested in participating in counseling. In order to cope with stress, the patient wakes up early and prays, meditates, reads, and watches TV while her  is still sleeping. Once he awakens, she spends her entire day attending to him. Current or historical suicidal ideation was denied.    With regard to substance abuse, Ms. De La Rosa denied history of past drug or alcohol abuse or dependence. She has never been a smoker. Currently, she reported that she typically consumes one glass of wine per night, which is something she started doing recently in order to help cope with stress. She also occasionally uses medicinal marijuana for pain management. Other current substance use was denied.    SOCIAL HISTORY:  Ms. De La Rosa was born and raised in [place]. She grew up speaking English, Melina, and Emigdio throughout her childhood. She attended English-speaking schools and spoke English in the home. She graduated high school and completed one year of college. She left college because she could no longer afford it. She earned mostly above average grades. Significant learning difficulties or developmental attention issues were denied. She was  via an arranged marriage in 1965, then lived in [place] from 1965 to 1981. She and her  moved to [place] in 1981 and have lived here " ever since. 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 She stopped working outside of the home in 2017 after her 's stroke. She has been  for 56 years, and they have 3 children together. The patient and her  live together in their own home in [place], [place].    TESTS ADMINISTERED:   Wechsler Memory Scale-III (WMS-III) select subtests, Wechsler Adult Intelligence Scale-IV (WAIS-IV) select subtests, Controlled Oral Word Association Test (COWAT) & Category Fluency, Shearer Verbal Learning Test - R Form 1 (HVLT-R), Oral Trails A & B, Geriatric Depression Scale-15 (GDS-15), Generalized Anxiety Disorder-7 (SIS-7).    MOANS norms were used for COWAT & Category Fluency   Calista [place], & Morena (2010) norms were used for Oral Trails    DESCRIPTIVE PERFORMANCE KEY:    Labels for tests with Normal Distributions  Score Label Standard Score %ile Rank   Exceptionally high score  > 130 > 98   Above average score 120-129 91-97   High average score 110-119 75-90   Average score  25-74   Low average score 80-89 9-24   Below average score 70-79 2-8   Exceptionally low score < 70 < 2     Labels for tests with Non-Normal Distributions  Score Label %ile Rank   Within normal expectations/ limits score (WNL) > 24   Low average score 9-24   Below average score 2-8   Exceptionally low score < 2     The following test results utilize score labels as adapted from Estiven Shoemaker, Mynor Sawyer, Gianna Hunter, MARKELL Lincoln, David Pineda Michael Westerveld & Conference Participatnts (2020): American Academy of Clinical Neuropsychology consensus conference statement on uniform labeling of performance test scores, The Clinical Neuropsychologist, DOI: 10.1080/[email or web address]. All scores contain some measure of error; scores are reported here as they are obtained by the individual (without reference to the range of error).  These are meant as labels and not interpretation of performance. While other relevant comments regarding task performance are provided below, please see the Summary, Impressions, and Diagnosis sections of this report for interpretation of the scores and the cognitive profile as a whole, including what does and does not constitute impairment for this particular individual.    TELEHEALTH NEUROPSYCHOLOGY LIMITATIONS:  Due to circumstances that prevent in-person clinical visits, this assessment was conducted using telehealth methods (including remote audio presentation of test instructions and test stimuli, and remote observation of performance via audio technologies). The standard administration of these procedures involves in-person, face-to-face methods. The impact of applying non-standard administration methods has been evaluated only in part by scientific research. While every effort was made to simulate standard assessment practices, the diagnostic conclusions and recommendations for treatment provided in this report are being advanced with these limitations in mind.    BEHAVIORAL OBSERVATIONS:   Ms. De La Rosa seemed alert and engaged. No hearing difficulties were observed. Conversational speech was of normal rate, volume, and prosody. No word-finding pauses or paraphasias were noted. Her thought process appeared linear and goal-directed, although she perseverated on her current stressors. No hallucinations or delusions were apparent. Judgment and insight appeared intact. Her mood was dysphoric and rapport was easily established.     During testing, she was alert throughout. She was pleasant and cooperative throughout the evaluation. She understood test instructions without difficulty. No frontal signs were observed behaviorally. Ms. De La Rosa appeared adequately motivated and engaged easily during testing. Her score on an embedded measure of performance validity was in the valid range. Overall, the following results are  considered a reasonably valid estimation of her current cognitive abilities.    OPTIMAL PREMORBID INTELLECT:  Optimal premorbid intellectual abilities were estimated as falling in the average range based on Ms. De La Rosa's educational and occupational histories and performance on tasks least likely to be affected by acquired brain dysfunction.    SUMMARY OF TEST RESULTS:  ORIENTATION. Performance on a mental status exam, assessing orientation to personal and current information, resulted in a within normal limits score. She was oriented to person, place, time, and date and was able to correctly name the current and previous presidents.    ATTENTION/WORKING MEMORY. The patient's score on a measure sensitive to sustained auditory-verbal attention and concentration (WAIS-IV Digit Span) was classified as high average, as she was able to recite up to 7 digits forward (a high average score), up to 5 digits backward (an average score), and up to 6 digits in sequence (an average score).     PROCESSING SPEED. On a test of complex concentration that requires speeded numeric sequencing (Oral Trails A), the patient's score was below average for completion time and without error.      LANGUAGE PROCESSING. Language comprehension appeared intact. Performance on a subtest that assesses one's fund of information resulted in an average score (WAIS-IV Information). The patient's performance on a test of phonemic fluency resulted in an exceptionally low score (COWAT) while her semantic fluency was average (Category Fluency).       LEARNING/MEMORY. On a 12-item verbal list-learning task (HVLT-R), the patient acquired up to 10 words (83%) of the word list by the 3rd and final learning trial (raw scores over trials = 7, 10, 9). Total learning acquisition was classified as an average score. After a 20-minute delay, the patient recalled 8 items, reflecting an average score with an 80% retention rate. Her recognition discriminability score was  fully intact, as she correctly recognized 12/12 items and made 0 false-positive errors.     EXECUTIVE FUNCTIONS. On a task that requires speeded alpha-numeric sequencing/cognitive set-shifting (Oral Trails B), the patient obtained a score that was average for completion time and with only one error. On a measure of processing speed and cognitive flexibility, the patient's score was low average (WMS-III Mental Control). The patient's performance on a test of phonemic fluency resulted in an exceptionally low score (COWAT).    MOOD. On the GDS-15 a self report measure of depressive symptomatology, she obtained a score of 2, placing her in the range of normal depression. She denied suicidal ideation. On the SIS-7, a self-report measure of anxiety, she obtained a score of 13,  placing her in the range of moderate anxiety.    ____________________________________________________________________________________    SERVICES PROVIDED & TIME:  Telephone Visit Details  Type of service: Telephone Visit    Interview with Provider (Dr. Lena Mike):   Telephone Start Time: 2:21 PM   Telephone End Time: 3:17 PM  Testing with Trained Examiner/Technician (Danni Chavez):   Telephone Start Time: 3:35 PM   Telephone End Time: 4:25 PM    Originating Location (pt. Location): Patient's Home  Distant Location (provider location): Remote work for [facility])    Mode of Communication:  Telephone     A clinical interview/neurobehavioral status examination was conducted with the patient and documented. I thoroughly reviewed the medical record, selected the neuropsychological test battery, provided supervision to the trained examiner/technician, interpreted/integrated patient data and test results, and engaged in clinical decision making, treatment planning, report writing/preparation and provision of interactive feedback of test results on 4/8/2021. A trained examiner/technician  administered and scored the neuropsychological tests (2+ tests).  Please see below for a breakdown of time spent and the associated codes billed for these services.  Services   Time Spent  CPT Codes   Neurobehavioral Status Exam:  (e.g., clinical interview and neurobehavioral status exam via telehealth, interpretation, report)   69 minutes   1 x 96116   Neuropsychological Evaluation Services:   (e.g., integration, interpretation, treatment planning, clinical decision making, feedback via telehealth)   164 minutes   1 x 96132  2 x 96133   Neuropsychological Testing by Trained Examiner/Technician:  (e.g., test administration, scoring, 2+ tests administered)   63 minutes   1 x 96138  1 x 96139   For diagnostic and coding purposes, Ms. De La Rosa has a history of hyperlipidemia, chronic back pain, frequent falls, and sensorineural hearing loss. 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 Please note, all charges are filed at the completion of the Episode of Care and associated with the final encounter date (feedback session on 4/8/2021).

## 2021-06-16 NOTE — PROGRESS NOTES
ACUPUNCTURIST TREATMENT NOTE      Leonardo De La Rosa, a 75 y.o. female, is here today for a Follow - Up exam. Patient is referred by Cassie Ortega. Visit 1 of 8 additional authorized.    HPI  Main Complaint: Chronic low back pain with pain radiating down bilateral legs. Patient was last seen in Sept 2020 for chronic low back pain. Patient reports since that time her back pain has somewhat worsened. She tells me she has tried PT, several injections, different medications, most of which have given her no relief. She does tend to feel better with movement and PT, but reports this is only temporary. Pain often increases at night time. Pain is in bilateral low back and radiating into glutes and legs to knees. She reports the pain is in her entire leg, does not feel it is limited to anterior, posterior, or lateral portion of leg. Pain today at time of treatment is rated at 6/10.    Secondary Complaints: Left shoulder pain, anxiety stress, poor sleep. Patient also continues with left shoulder pain. She reports some relief after injections to left shoulder. Pain level is rated at 5/10 today. 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 She has very poor sleep which she feels is related to both her pain levels and her high stress and anxiety and worry.     Past Medical History  Past Medical History Reviewed: Yes   has a past medical history of Hyperlipidemia.    Objective  Basic Exam Completed:   No    TCM Exam Completed: Yes   Energy: low  Sleep: Delayed sleep onset, frequent awakening and restless due to pain  Emotions: Yes: Anxiety and Worry/Over Thinking  Limbs/Back: Left Upper Extremity, Bilateral Lower Extremity and Lower Back    Tongue/Pulse Exam Completed: No    Patient Assessment  Patient Type: Pain    Patient Complaint: Chronic low back pain, left shoulder pain, anxiety/stress    Acupuncture 8/18/2020 8/25/2020 9/1/2020 9/3/2020 9/8/2020 4/13/2021  "  Intervention Reason Pain; Anxiety/Stress Pain; Anxiety/Stress Anxiety/Stress; Pain; Pain 2 Pain; Pain 2; Anxiety/Stress Pain; Pain 2 Pain; Wellness; Anxiety/Stress; Pain 2   Pain Location - - - - - low back   Pre-session Pain Rating 6 7 5 5 8 6   Post-session Pain Rating 2 - - - - 2   Pain 2 Location - - - - - left shoulder   Pre-session Pain 2 Rating - - 7 7 10 5       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, Kidney and Heart Yin Deficiency    Treatment Principle: Move Qi and Blood, Nourish Yin, Calm Meléndez    TCM / Acupuncture Treatment  Acupuncture Points:       Initial insertions: HTJJ L2-L5, Shiqizhuixia, [id number]-[id number], Ub23, Cd35-Ri36, [place]an, Gb29, Gb30, Ub40       Second insertions: Baihui, Anmian, Ub60, Gb34                    Accessory Techniques 4/13/2021   Accessory Techniques TDP Heat Lamp; Tuina; Topicals   TDP Heat Lamp location used low back   Tuina location used low back, left shoulder   Topicals Po Sum On   Topicals location used low back, left shoulder       Assessment and Plan  Treatment Observations: Patient relaxed well during treatment. Reported post-treatment \"I feel so much better!\"  Acupuncture Treatment Recommendations: Patient to schedule weekly visits as able for 7 more treatments, then re-evaluate.  Additional Recommendations:           I addressed all of the patient's questions to their satisfaction and was given consent to treat. It is my recommendation that patient seek advice from their Primary Care Provider about active symptoms not addressed during this visit.    Patient understands that acupuncture is not a guarantee of benefits and will verify with own insurance and/or delores specialists. The risks and benefits of acupuncture were reviewed and the patient stated understanding. Answered all patient's questions to their satisfaction. Scope of practice and the acupuncturist's qualifications were also reviewed the patient provided signed consent.     Time Spent with " Patient:   I spent a total of 30 minutes face-to-face with Leonardo De La Rosa during today's office visit.     Juliann Bennett L.Ac.  04/13/21  2:21 PM

## 2021-06-17 NOTE — PROGRESS NOTES
ACUPUNCTURIST TREATMENT NOTE      Leonardo De La Rosa, a 75 y.o. female, is here today for a Follow - Up exam. Patient is referred by Cassie Ortega.    HPI  Main Complaint: Chronic low back pain. Patient reports after last treatment feeling benefit for the remainder of the day, however, in the evening pain began to return again. Feeling more sore due to wearing boot on right foot and having a change in walking pattern. Pain today is rated 5/10. Pain in low back radiating into lateral hips and down both legs at times.    Secondary Complaints: Anxiety, left shoulder pain. 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 Does not provide rating today. Patient has very poor sleep which can be due to her pain or waking up to help her . After she wakes up she cannot fall back to sleep. She says she will get about 4 hours of sleep per night. Continues with left shoulder pain as well rating 5/10.    Past Medical History  Past Medical History Reviewed: Yes   has a past medical history of Hyperlipidemia.    Objective  Basic Exam Completed:   No    TCM Exam Completed: Yes   Energy: low  Sleep: Early awakening, frequent awakening and restless due to pain  Emotions: Yes: Anxiety and Worry/Over Thinking  Limbs/Back: Left Upper Extremity and Lower Back    Tongue/Pulse Exam Completed: No    Patient Assessment  Patient Type: Pain    Patient Complaint: Chronic low back pain, left shoulder pain, anxiety/stress    Acupuncture 8/18/2020 8/25/2020 9/1/2020 9/3/2020 9/8/2020 4/13/2021 4/27/2021   Intervention Reason Pain; Anxiety/Stress Pain; Anxiety/Stress Anxiety/Stress; Pain; Pain 2 Pain; Pain 2; Anxiety/Stress Pain; Pain 2 Pain; Wellness; Anxiety/Stress; Pain 2 Pain; Pain 2; Wellness   Pain Location - - - - - low back low back   Pre-session Pain Rating 6 7 5 5 8 6 5   Post-session Pain Rating 2 - - - - 2 3   Pain 2 Location - - - - - left shoulder left shoulder   Pre-session Pain 2 Rating - - 7 7 10  5 5       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 Left: Li15, Tb14, Li14                    Accessory Techniques 4/13/2021 4/27/2021   Accessory Techniques TDP Heat Lamp; Tuina; Topicals TDP Heat Lamp; Tuina; Topicals   TDP Heat Lamp location used low back low back   Tuina location used low back, left shoulder low back, left shoulder   Topicals Po Sum On Po Sum On   Topicals location used low back, left shoulder low back, left shoulder       Assessment and Plan  Treatment Observations: Patient relaxed well during treatment, reporting she felt much better and more relaxed.  Acupuncture Treatment Recommendations:    Additional Recommendations:           I addressed all of the patient's questions to their satisfaction and was given consent to treat. It is my recommendation that patient seek advice from their Primary Care Provider about active symptoms not addressed during this visit.    Patient understands that acupuncture is not a guarantee of benefits and will verify with own insurance and/or delores specialists. The risks and benefits of acupuncture were reviewed and the patient stated understanding. Answered all patient's questions to their satisfaction. Scope of practice and the acupuncturist's qualifications were also reviewed the patient provided signed consent.     Time Spent with Patient:   I spent a total of 30 minutes face-to-face with Leonardo De La Rosa during today's office visit.     Juliann Bennett L.Ac.  04/27/21  11:50 AM

## 2021-06-17 NOTE — PROGRESS NOTES
ACUPUNCTURIST TREATMENT NOTE      Leonardo De La Rosa, a 75 y.o. female, is here today for a Follow - Up exam. Patient is referred by Cassie Ortega.    HPI  Main Complaint: Chronic low back pain. Patient reports after last acupuncture treatment she felt improvement in pain for 2-3 days, then pain levels started to rise again. Today she is feeling increase in pain at 7/10 in low back as well as tingling down lateral LLE. The LLE tingling began about 2 or 3 days ago. She has been using topical medical THC cream and it is not helping.    Secondary Complaints: Neck pain; anxiety; insomnia. Patient also complains of bilateral neck pain rating 7/10. She is feeling very stressed out today in dealing with matters at home and being the main caretaker. Continued anxiety and stress in this situation. She has increase in pain when stress is higher. She complains as well of very poor sleep due to both stress/anxiety/over thinking as well as pain.    Past Medical History  Past Medical History Reviewed: Yes   has a past medical history of Hyperlipidemia.    Objective  Basic Exam Completed:   No    TCM Exam Completed: Yes   Energy: low  Sleep: frequent awakening and restless due to pain  Emotions: Yes: Anxiety and Worry/Over Thinking  Limbs/Back: Left Lower Extremity and Upper and Lower Back    Tongue/Pulse Exam Completed: No    Patient Assessment  Patient Type: Pain    Patient Complaint: Chronic low back pain, neck pain, anxiety/stress    Acupuncture 8/25/2020 9/1/2020 9/3/2020 9/8/2020 4/13/2021 4/27/2021 5/11/2021   Intervention Reason Pain; Anxiety/Stress Anxiety/Stress; Pain; Pain 2 Pain; Pain 2; Anxiety/Stress Pain; Pain 2 Pain; Wellness; Anxiety/Stress; Pain 2 Pain; Pain 2; Wellness Pain; Pain 2; Anxiety/Stress; Wellness   Pain Location - - - - low back low back low back   Pre-session Pain Rating 7 5 5 8 6 5 7   Post-session Pain Rating - - - - 2 3 3   Pain 2 Location - - - - left shoulder left shoulder neck   Pre-session Pain  2 Rating - 7 7 10 5 5 7   Post-session Pain 2 Rating - - - - - - 3       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 Left: Ub53, Ub54, Gb30       Second insertions: Baihui, Gb20, Ub10, Anmian, Ht7, Ub60, Ki3, Sp6, Ub57. Left: Ub40, Gb31                    Accessory Techniques 4/13/2021 4/27/2021 5/11/2021   Accessory Techniques TDP Heat Lamp; Tuina; Topicals TDP Heat Lamp; Tuina; Topicals TDP Heat Lamp; E-Stim; Tuina; Topicals   TDP Heat Lamp location used low back low back low back   E-Stim Hz - - 2x4 micro current   E-Stim location used - - (L) HTJJ L4-L5, (L) [id number]   Tuina location used low back, left shoulder low back, left shoulder low back, neck, LLE   Topicals Po Sum On Po Sum On Other (see comment)   Topicals location used low back, left shoulder low back, left shoulder low back, neck, LLE       Assessment and Plan  Treatment Observations: Patient relaxed well during treatment.  Acupuncture Treatment Recommendations: Instructed patient to schedule ahead weekly treatments for 6-8 weeks.  Additional Recommendations:           I addressed all of the patient's questions to their satisfaction and was given consent to treat. It is my recommendation that patient seek advice from their Primary Care Provider about active symptoms not addressed during this visit.    Patient understands that acupuncture is not a guarantee of benefits and will verify with own insurance and/or delores specialists. The risks and benefits of acupuncture were reviewed and the patient stated understanding. Answered all patient's questions to their satisfaction. Scope of practice and the acupuncturist's qualifications were also reviewed the patient provided signed consent.     Time  Spent with Patient:   I spent a total of 30 minutes face-to-face with Leonardo De La Rosa during today's office visit.     Juliann Bennett L.Ac.  05/11/21  3:53 PM

## 2021-06-21 ENCOUNTER — TRANSFERRED RECORDS (OUTPATIENT)
Dept: HEALTH INFORMATION MANAGEMENT | Facility: CLINIC | Age: 76
End: 2021-06-21

## 2021-07-03 NOTE — ADDENDUM NOTE
Addendum Note by Juliann Bennett Acupuncturist at 8/25/2020 11:00 AM     Author: Juliann Bennett, Andres Service: -- Author Type: Acupuncturist    Filed: 8/25/2020  1:51 PM Date of Service: 8/25/2020 11:00 AM Status: Signed    : Juliann Bennett, Andres (Acupuncturist)    Encounter addended by: Juliann Bennett Acupuncturist on: 8/25/2020  1:51   PM      Actions taken: Flowsheet accepted, Clinical Note Signed

## 2021-07-04 NOTE — ADDENDUM NOTE
Addendum Note by Juliann Bennett Acupuncturist at 4/13/2021 10:30 AM     Author: Juliann Bennett, Andres Service: -- Author Type: Acupuncturist    Filed: 4/13/2021  2:21 PM Date of Service: 4/13/2021 10:30 AM Status: Signed    : Juliann Bennett, Andres (Acupuncturist)    Encounter addended by: Juliann Bennett Acupuncturist on: 4/13/2021  2:21   PM      Actions taken: Flowsheet accepted, Clinical Note Signed

## 2021-07-09 ENCOUNTER — TRANSFERRED RECORDS (OUTPATIENT)
Dept: HEALTH INFORMATION MANAGEMENT | Facility: CLINIC | Age: 76
End: 2021-07-09

## 2021-07-13 ENCOUNTER — TRANSFERRED RECORDS (OUTPATIENT)
Dept: HEALTH INFORMATION MANAGEMENT | Facility: CLINIC | Age: 76
End: 2021-07-13

## 2021-07-20 ENCOUNTER — TRANSFERRED RECORDS (OUTPATIENT)
Dept: HEALTH INFORMATION MANAGEMENT | Facility: CLINIC | Age: 76
End: 2021-07-20

## 2021-08-02 ENCOUNTER — TRANSFERRED RECORDS (OUTPATIENT)
Dept: HEALTH INFORMATION MANAGEMENT | Facility: CLINIC | Age: 76
End: 2021-08-02

## 2021-08-16 ENCOUNTER — HOSPITAL ENCOUNTER (EMERGENCY)
Facility: HOSPITAL | Age: 76
Discharge: HOME | End: 2021-08-16
Attending: EMERGENCY MEDICINE | Admitting: EMERGENCY MEDICINE
Payer: MEDICARE

## 2021-08-16 VITALS
DIASTOLIC BLOOD PRESSURE: 90 MMHG | SYSTOLIC BLOOD PRESSURE: 192 MMHG | BODY MASS INDEX: 21.61 KG/M2 | HEART RATE: 82 BPM | OXYGEN SATURATION: 97 % | RESPIRATION RATE: 18 BRPM | WEIGHT: 138 LBS | TEMPERATURE: 97 F

## 2021-08-16 DIAGNOSIS — R33.9 URINARY RETENTION WITH INCOMPLETE BLADDER EMPTYING: ICD-10-CM

## 2021-08-16 LAB
ALBUMIN UR-MCNC: NEGATIVE MG/DL
APPEARANCE UR: CLEAR
BILIRUB UR QL STRIP: NEGATIVE
COLOR UR AUTO: COLORLESS
GLUCOSE UR STRIP-MCNC: NEGATIVE MG/DL
HGB UR QL STRIP: NEGATIVE
KETONES UR STRIP-MCNC: NEGATIVE MG/DL
LEUKOCYTE ESTERASE UR QL STRIP: NEGATIVE
MUCOUS THREADS #/AREA URNS LPF: PRESENT /LPF
NITRATE UR QL: NEGATIVE
PH UR STRIP: 6 [PH] (ref 5–7)
RBC URINE: <1 /HPF
SP GR UR STRIP: 1.01 (ref 1–1.03)
UROBILINOGEN UR STRIP-MCNC: <2 MG/DL
WBC URINE: <1 /HPF

## 2021-08-16 PROCEDURE — 99283 EMERGENCY DEPT VISIT LOW MDM: CPT

## 2021-08-16 PROCEDURE — 99285 EMERGENCY DEPT VISIT HI MDM: CPT | Mod: 25

## 2021-08-16 PROCEDURE — 81001 URINALYSIS AUTO W/SCOPE: CPT | Performed by: EMERGENCY MEDICINE

## 2021-08-16 ASSESSMENT — ENCOUNTER SYMPTOMS
VOMITING: 0
ABDOMINAL PAIN: 1

## 2021-08-16 NOTE — DISCHARGE INSTRUCTIONS
Please follow-up with [facility] for your trial of void in the next 3 to 5 days.  If there are any abnormalities like infection that are noted on your urinalysis, you will be contacted by phone and a prescription for antibiotics will be ordered.

## 2021-08-16 NOTE — ED TRIAGE NOTES
Patient has been unable to urinate since 2000. Lower mid abdominal pain at this time, patient states that it feels like retention. Denies recent history of dysuria, frequency, or hematuria.

## 2021-08-16 NOTE — ED PROVIDER NOTES
EMERGENCY DEPARTMENT ENCOUNTER      NAME: Leonardo De La Rosa  AGE: 75 year old female  YOB: 1945  MRN: [id number]  EVALUATION DATE & TIME: No admission date for patient encounter.    PCP: Cassie Ortega    ED PROVIDER: Tonie Cohen M.D.      Chief Complaint   Patient presents with     Urinary Retention         FINAL IMPRESSION:  1. Urinary retention with incomplete bladder emptying        MEDICAL DECISION MAKIN:46 AM I met with the patient, obtained history, performed an initial exam, and discussed options and plan for diagnostics and treatment here in the ED.   Pertinent Labs & Imaging studies reviewed. (See chart for details)         Leonardo De La Rosa is a 75 year oldfemale who presents with acute urinary retention.  The patient has had this happen in the past.  Bermeo catheter was placed in triage and almost 1 L of clear urine was drained.  This did relieve her abdominal pain.  I will check a UA but low suspicion for infection.  She has no other signs or symptoms of infection.    UA does not show any evidence for infection.  The patient will be sent with a leg bag and will follow up with [facility] for trial of void in 3 to 5 days.  We discussed warning signs and indications to return to the emergency department.  She understands these warning signs and will return with any concerns.     =================================================================    HPI    Patient information was obtained from: Patient    Use of : N/A         Leonardo De La Rosa is a 75 year old female with a history of GERD, pseudodementia, and spinal stenosis who presents to this ED by car for evaluation of urinary retention.     Patient reports that at  she could not pee and she tried doing her exercises, taking a bath, drinking water and still was unable to pee. This happened to her in September after her surgery. Her left hand is in a cast because she has a hand vessel erupted and she may need  surgery again on it. She currently has some abdominal pain but is better now after the bull. Patient is not on any medications and denies any vomiting, or any other complaints at this time.       REVIEW OF SYSTEMS   Review of Systems   Gastrointestinal: Positive for abdominal pain. Negative for vomiting.   Genitourinary: Positive for decreased urine volume.   All other systems reviewed and are negative.       PAST MEDICAL HISTORY:  Past Medical History:   Diagnosis Date     Arthritis     Feet Hands and back.     PONV (postoperative nausea and vomiting)     Severe     Postsurgical arthrodesis status 4/23/2013     Status post lumbar spinal fusion 2/24/2020       PAST SURGICAL HISTORY:  Past Surgical History:   Procedure Laterality Date     ARTHRODESIS FOOT  4/22/2013    Procedure: ARTHRODESIS FOOT;  Left Second and Third Tarsal metatarsal Arthrodesis, Bunion Correction, Lapidus Procedure, Weil Osteotomy   ;  Surgeon: Alber Pace MD;  Location: [facility] OR     BUNIONECTOMY LAPIDUS WITH TARSAL METATARSAL (TMT) FUSION  4/22/2013    Procedure: BUNIONECTOMY LAPIDUS WITH TARSAL METATARSAL (TMT) FUSION;;  Surgeon: Alber Pace MD;  Location: [facility] OR     C TOTAL KNEE ARTHROPLASTY      left     FOOT SURGERY      right bunionectomy.     FOOT SURGERY       HYSTERECTOMY       HYSTERECTOMY TOTAL ABDOMINAL, BILATERAL SALPINGO-OOPHORECTOMY, COMBINED       KNEE SURGERY         CURRENT MEDICATIONS:    No current facility-administered medications for this encounter.    Current Outpatient Medications:      acetaminophen (TYLENOL) 325 MG tablet, Take 3 tablets by mouth every 8 hours., Disp: 100 tablet, Rfl: 0     ASPIRIN EC PO, Take 81 mg by mouth daily., Disp: , Rfl:      calcium carbonate-vitamin D (CALCIUM + D) 600-200 MG-UNIT TABS, Take 1 tablet by mouth daily., Disp: , Rfl:      Celecoxib (CELEBREX PO), Take 200 mg by mouth daily., Disp: , Rfl:      Chlorpheniramine-Phenylephrine 4-10 MG TABS, , Disp: , Rfl:       Cholecalciferol (VITAMIN D3 PO), Take 1,000 Units by mouth daily., Disp: , Rfl:      denosumab (PROLIA) 60 MG/ML SOSY injection, Inject 60 mg Subcutaneous once, Disp: , Rfl:      DULoxetine HCl 40 MG CPEP, Take 60 mg by mouth daily, Disp: , Rfl:      famotidine (PEPCID) 40 MG tablet, TAKE 1 TABLET BY MOUTH TWICE A DAY, Disp: , Rfl:      fluocin-hydroquinone-tretinoin (TRI-RODNEY) 0.01-4-0.05 % CREA, Externally apply topically At Bedtime, Disp: 30 g, Rfl: 3     fluticasone (FLONASE) 50 MCG/ACT nasal spray, INSTILL 1 SPRAY INTO EACH NOSTRIL ONCE DAILY AS NEEDED FOR RHINITIS, Disp: , Rfl:      glucosamine-chondroitin 500-400 MG CAPS, Take 1 capsule by mouth daily., Disp: , Rfl:      losartan (COZAAR) 100 MG tablet, Take 100 mg by mouth, Disp: , Rfl:      medical cannabis (Patient's own supply), See Admin Instructions (The purpose of this order is to document that the patient reports taking medical cannabis.  This is not a prescription, and is not used to certify that the patient has a qualifying medical condition.), Disp: , Rfl:      methocarbamol (ROBAXIN) 500 MG tablet, TAKE 1 TABLET BY MOUTH 3 TIMES A DAY, Disp: , Rfl:      multivitamin, therapeutic with minerals (MULTI-VITAMIN) TABS, Take 1 tablet by mouth daily., Disp: , Rfl:      Omega-3 Fatty Acids (OMEGA-3 FISH OIL PO), Take 1,200 g by mouth every 7 days , Disp: , Rfl:      pantoprazole (PROTONIX) 40 MG EC tablet, TAKE 1 TABLET BY MOUTH TWICE A DAY BEFORE MEALS, Disp: , Rfl:      pravastatin (PRAVACHOL) 40 MG tablet, Take 40 mg by mouth, Disp: , Rfl:      pregabalin (LYRICA) 100 MG capsule, Take 100 mg by mouth daily, Disp: , Rfl:      zolpidem (AMBIEN) 5 MG tablet, Take 5 mg by mouth, Disp: , Rfl:     ALLERGIES:  Allergies   Allergen Reactions     Estratest H.S. Other (See Comments) and Unknown     Other reaction(s): Other (see comments)  Increase lipids  Outside source did not list reaction  Increase lipids       Propofol Nausea and Vomiting     Outside source  "states \"injectable and inhaled\"  Outside source states \"injectable and inhaled\"  Outside source states \"injectable and inhaled\"  Outside source states \"injectable and inhaled\"       Reglan      Estrogens Rash     Other reaction(s): Other (see comments)  Outside source did not list reaction  Estrogen patches, from patch only, ( Adhesive )  does tolerate estrogen other forms        Penicillins Nausea and Vomiting and Nausea     Other reaction(s): GI intolerance, GI Upset  Patient states upset stomach.  Patient states upset stomach.  Patient states upset stomach.  Patient states upset stomach.         FAMILY HISTORY:  Family History   Problem Relation Age of Onset     Coronary Artery Disease Mother        SOCIAL HISTORY:   Social History     Tobacco Use     Smoking status: Never Smoker     Smokeless tobacco: Never Used   Substance Use Topics     Alcohol use: No     Drug use: No        PHYSICAL EXAM:    Vitals: BP (!) 192/90   Pulse 82   Temp 97  F (36.1  C) (Temporal)   Resp 18   Wt 62.6 kg (138 lb)   SpO2 97%   BMI 21.61 kg/m     General:. Alert and interactive, comfortable appearing.  HENT: Oropharynx without erythema or exudates. MMM.  TMs clear bilaterally.  Eyes: Pupils mid-sized and equally reactive.   Neck: Full AROM.  No midline tenderness to palpation.  Cardiovascular: Regular rate and rhythm. Peripheral pulses 2+ bilaterally.  Chest/Pulmonary: Normal work of breathing. Lung sounds clear and equal throughout, no wheezes or crackles. No chest wall tenderness or deformities.  Abdomen: Soft, nondistended. Nontender without guarding or rebound.  : One liter of clear urine draining from bull.   Back/Spine: No CVA or midline tenderness.  Extremities: Normal ROM of all major joints. No lower extremity edema.   Skin: Warm and dry. Normal skin color.   Neuro: Speech clear. CNs grossly intact. Moves all extremities appropriately. Strength and sensation grossly intact to all extremities.   Psych: Normal " affect/mood, cooperative, memory appropriate.     LAB:  All pertinent labs reviewed and interpreted.  Labs Ordered and Resulted from Time of ED Arrival Up to the Time of Departure from the ED   ROUTINE UA WITH MICROSCOPIC REFLEX TO CULTURE - Abnormal; Notable for the following components:       Result Value    Mucus Urine Present (*)     All other components within normal limits    Narrative:     Urine Culture not indicated   BLADDER SCAN   IP ACUTE INDWELLING URINARY CATHETER (PRYOR)   LEG BAG     I, Arturo Becerra, am serving as a scribe to document services personally performed by Dr. Tonie Cohen  based on my observation and the provider's statements to me. I, Tonie Cohen MD attest that Arturo Becerra is acting in a scribe capacity, has observed my performance of the services and has documented them in accordance with my direction.      Tonie Cohen M.D.  Emergency Medicine  [facility] EMERGENCY DEPARTMENT  [facility] [facility] [address]  [phone]  Dept: [phone]         Tonie Cohen MD  08/16/21 0334

## 2021-08-24 ENCOUNTER — TELEPHONE (OUTPATIENT)
Dept: NEUROLOGY | Facility: CLINIC | Age: 76
End: 2021-08-24

## 2021-08-24 NOTE — TELEPHONE ENCOUNTER
Naila called today to make an appointment for her mom as she is declining in her health. I offered her Friday the 27th as there was an open time on  schedule and she declined this for her mom as her dad is currently in the hospital. So next available was November 23rd and I put her on wait list as well.  Naila stats that she talked to [place] and that we would try to get her in sooner if we can. [phone]  Thank you!

## 2021-08-27 ENCOUNTER — OFFICE VISIT (OUTPATIENT)
Dept: NEUROLOGY | Facility: CLINIC | Age: 76
End: 2021-08-27
Payer: MEDICARE

## 2021-08-27 VITALS
SYSTOLIC BLOOD PRESSURE: 140 MMHG | DIASTOLIC BLOOD PRESSURE: 71 MMHG | BODY MASS INDEX: 23.73 KG/M2 | WEIGHT: 139 LBS | HEIGHT: 64 IN | HEART RATE: 89 BPM

## 2021-08-27 DIAGNOSIS — R41.89 PSEUDODEMENTIA: Primary | ICD-10-CM

## 2021-08-27 DIAGNOSIS — M47.816 LUMBAR SPONDYLOSIS: ICD-10-CM

## 2021-08-27 PROCEDURE — 99215 OFFICE O/P EST HI 40 MIN: CPT | Performed by: PSYCHIATRY & NEUROLOGY

## 2021-08-27 RX ORDER — COVID-19 ANTIGEN TEST
220 KIT MISCELLANEOUS 2 TIMES DAILY WITH MEALS
COMMUNITY
End: 2021-11-29

## 2021-08-27 ASSESSMENT — MIFFLIN-ST. JEOR: SCORE: 1115.5

## 2021-08-27 NOTE — LETTER
2021         RE: Leonardo De La Rosa  [address] [place] [address]        Dear Colleague,    Thank you for referring your patient, Leonardo De La Rosa, to the [facility] [place]. Please see a copy of my visit note below.    NEUROLOGY FOLLOW UP VISIT  NOTE       [facility] [place]  [address] [place] [address]  Tel: [phone]  Fax: [phone]  [email or web address]     Leonardo De La Rosa,  1945, MRN [id number]  PCP: Cassie Ortega  Date: 2021      ASSESSMENT & PLAN     Visit Diagnosis  1. Pseudodementia     Pseudodementia  75-year-old female with SNHL, osteoporosis, good who was previously evaluated for cognitive decline.  She had extensive work-up including MRI, MRA, lab work and neuropsychological testing that was normal.  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  She was kept on Cymbalta and since her last visit had an E consult with psychiatry and adjustment in her psych medication was made.  After repeat MRI scan I plan on repeating in person neuropsychological testing as daughter is concerned that there clearly is significant cognitive decline and she is struggling with activities of daily living.  Additionally, EEG will be done    Frequent Falls  Since her last visit she was seen at [facility] and had MRI of cervical spine, EMG and MRI lumbar spine that did not show any high-grade stenosis.  They felt her weakness was related to hip flexor involvement due to spinal canal stenosis and was sent for physical therapy.  I have recommended checking MRI of the brain, folate, B12 and methylmalonic acid level.  She was encouraged to use a 4 pronged cane and to continue with physical therapy.    Thank you again for this referral, please feel free to contact me if you have any questions.    Augustin Terry MD  [facility]  (Formerly,  [facility]     HISTORY OF PRESENT ILLNESS     Patient is a 75-year-old female with history of sensorineural hearing loss, osteoporosis, GERD last seen on April 12, 2021 who returns for follow-up for her pseudodementia.  Family was concerned about progressive cognitive decline and frequent fall at times not remembering events leading up to her fall.  Daughters were concerned that she was having memory difficulty and tends to get confused easily and fell down not remembering how the fall occurred.  She is under a lot of stress as she is taking care of her  who has dementia, history of intracranial hemorrhage that is taking an emotional toll.  According to family members her  is the high maintenance due to cultural issues and couple is having difficulty managing their life and later part of of their lives.  Work-up included MRI of the brain and MRA that was normal.  Lab work included normal vitamin B12, TSH, vitamin B1, MMA, homocystine, folic acid and alpha-tocopherol.  Although she scored 23/30 on Perry cognitive assessment her neuropsychological testing did not find any cognitive disorder and it was felt patient has adjustment disorder with anxiety.  She was on Cymbalta and previously was confused about the dose of Cymbalta she was taking.  Her daughter called recently and wanted her to come in due to declining neuro status.  According to patient since her last visit she was seen at [facility] and apparently had MRI and EMG and was referred to [facility].  Daughter is concerned that her gait and cognition has declined significantly.  Her  was in the hospital and she is struggling to manage her  and her medical issues.  She has fallen on few times and was referred to physical therapy and [facility].  They had recommended a cane but patient is embarrassed to use it.     PROBLEM LIST   Patient Active Problem List   Diagnosis  "Code     Gastroesophageal reflux disease K21.9     Osteoporosis M81.0     Sensorineural hearing loss, bilateral H90.3     Spinal stenosis M48.00     HLD (hyperlipidemia) E78.5     Pseudodementia R41.89         PAST MEDICAL & SURGICAL HISTORY     Past Medical History:   Patient  has a past medical history of Arthritis, PONV (postoperative nausea and vomiting), Postsurgical arthrodesis status (4/23/2013), and Status post lumbar spinal fusion (2/24/2020).    Surgical History:  She  has a past surgical history that includes TOTAL KNEE ARTHROPLASTY; Foot surgery; Hysterectomy total abdominal, bilateral salpingo-oophorectomy, combined; Arthrodesis foot (4/22/2013); Bunionectomy lapidus with tarsal metatarsal (TMT) fusion (4/22/2013); Hysterectomy; knee surgery; and Foot surgery.     SOCIAL HISTORY     Reviewed, and she  reports that she has never smoked. She has never used smokeless tobacco. She reports that she does not drink alcohol and does not use drugs.     FAMILY HISTORY     Reviewed, and family history includes Coronary Artery Disease in her mother.     ALLERGIES     Allergies   Allergen Reactions     Estratest H.S. Other (See Comments) and Unknown     Other reaction(s): Other (see comments)  Increase lipids  Outside source did not list reaction  Increase lipids       Propofol Nausea and Vomiting     Outside source states \"injectable and inhaled\"  Outside source states \"injectable and inhaled\"  Outside source states \"injectable and inhaled\"  Outside source states \"injectable and inhaled\"       Reglan      Estrogens Rash     Other reaction(s): Other (see comments)  Outside source did not list reaction  Estrogen patches, from patch only, ( Adhesive )  does tolerate estrogen other forms        Penicillins Nausea and Vomiting and Nausea     Other reaction(s): GI intolerance, GI Upset  Patient states upset stomach.  Patient states upset stomach.  Patient states upset stomach.  Patient states upset stomach.       "     REVIEW OF SYSTEMS     A 12 point review of system was performed and was negative except as outlined in the history of present illness.     HOME MEDICATIONS     Current Outpatient Rx   Medication Sig Dispense Refill     acetaminophen (TYLENOL) 325 MG tablet Take 3 tablets by mouth every 8 hours. 100 tablet 0     calcium carbonate-vitamin D (CALCIUM + D) 600-200 MG-UNIT TABS Take 1 tablet by mouth daily.       Celecoxib (CELEBREX PO) Take 200 mg by mouth daily.       Cholecalciferol (VITAMIN D3 PO) Take 1,000 Units by mouth daily.       denosumab (PROLIA) 60 MG/ML SOSY injection Inject 60 mg Subcutaneous once       DULoxetine (CYMBALTA) 60 MG capsule Take 60 mg by mouth daily        famotidine (PEPCID) 40 MG tablet TAKE 1 TABLET BY MOUTH TWICE A DAY       fluticasone (FLONASE) 50 MCG/ACT nasal spray INSTILL 1 SPRAY INTO EACH NOSTRIL ONCE DAILY AS NEEDED FOR RHINITIS       glucosamine-chondroitin 500-400 MG CAPS Take 1 capsule by mouth daily.       losartan (COZAAR) 100 MG tablet Take 100 mg by mouth       methocarbamol (ROBAXIN) 500 MG tablet TAKE 1 TABLET BY MOUTH 3 TIMES A DAY       multivitamin, therapeutic with minerals (MULTI-VITAMIN) TABS Take 1 tablet by mouth daily.       naproxen sodium 220 MG capsule Take 220 mg by mouth 2 times daily (with meals)       Omega-3 Fatty Acids (OMEGA-3 FISH OIL PO) Take 1,200 g by mouth every 7 days        OMEPRAZOLE PO        pravastatin (PRAVACHOL) 40 MG tablet Take 40 mg by mouth       pregabalin (LYRICA) 100 MG capsule Take 100 mg by mouth 2 times daily        zolpidem (AMBIEN) 5 MG tablet Take 5 mg by mouth       Chlorpheniramine-Phenylephrine 4-10 MG TABS        fluocin-hydroquinone-tretinoin (TRI-RODNEY) 0.01-4-0.05 % CREA Externally apply topically At Bedtime 30 g 3     medical cannabis (Patient's own supply) See Admin Instructions (The purpose of this order is to document that the patient reports taking medical cannabis.  This is not a prescription, and is not used to  "certify that the patient has a qualifying medical condition.) (Patient not taking: Reported on 8/27/2021)           PHYSICAL EXAM     Vital signs  BP (!) 140/71 (BP Location: Right arm, Patient Position: Sitting)   Pulse 89   Ht 1.626 m (5' 4\")   Wt 63 kg (139 lb)   BMI 23.86 kg/m      Weight:   139 lbs 0 oz    Patient is alert and oriented x4 in no acute distress. Vital signs were reviewed and are documented in electronic medical record. Neck was supple, no carotid bruits, thyromegaly, JVD, or lymphadenopathy was noted.   NEUROLOGY EXAM:    Patient s speech was normal with no aphasia or dysarthria.  She appears somewhat confused    Funduscopic exam was normal, with normal cup to disc ratio. Cranial nerves II -XII were intact.  Except she is hard of hearing    Patient had normal mass, tone with 5/5 strength in the upper extremity proximally in the lower extremity 4+/5 distally 5/5    Sensation was intact to light touch, pinprick, and vibratory sensation.     Reflexes were 0 symmetrical with downgoing toes.     No dysmetria noted on FNF or HKS.     She has a wide-based gait and has trouble tandem walking Romberg negative     DIAGNOSTIC STUDIES     PERTINENT RADIOLOGY  Following imaging studies were reviewed:     CT BRAIN 12/17/2020  1.  No acute intracranial process.  2.  Minimal chronic small vessel ischemic disease.     CT LUMBAR SPINE 9/23/2020  1. New but subacute to chronic appearing fractures of L2, including mild  superior endplate compression and incompletely healed bilateral pedicle  fractures, which on the left extend into the posterosuperior vertebral body.  2. Postoperative changes including anterior and posterior instrumented fusion at  L5-S1. No hardware failure. No advanced spinal canal or neural foraminal  narrowing at any level.     MRI CERVICAL SPINE 7/12/2021  1.  Mild central canal and left foraminal narrowing at C6-7  2.  Mild degenerative changes within the remainder of the cervical spine as " above    MRI, MRA BRAIN 2/26/2021  HEAD MRI:   1.  Mild chronic small vessel ischemic changes and age-related changes.  2.  Otherwise normal brain MRI.    HEAD MRA:   1.  Normal MRA [place] [facility] [place].    NECK MRA:  1.  Minimal plaque in the right carotid bulb, but no significant associated stenosis by NASCET criteria.  2.   Otherwise normal neck MRA.     MRI LUMBAR SPINE 9/23/2020  No new spondylolisthesis since 05/04/2020. Interval subacute  appearing superior L2 compression fracture and bilateral pedicle fractures.  Stable chronic healing fracture through the posterolateral left L3 vertebral  body and pedicle.     EMG 7/13/2021  1.  This is a normal electrodiagnostic study of bilateral lower extremities  2.  Specifically there is no electrodiagnostic evidence of a lumbar radiculopathy, plexopathy or peripheral apathy  NEUROPSYCHOLOGICAL EVALUATION 3/25/2021  No Cognitive Disorder     Adjustment Disorder with Anxiety     RECOMMENDATIONS:  1) Stress management strategies are strongly encouraged, as improvement in emotional distress will lead to perceived improvements in her cognitive functioning (and even her physical pain level to some degree) over time. Such strategies may include:  ? Participating in individual psychotherapy. If the patient is amenable, I will refer her to our psychologist (Dr. Flavia Rivera). I will have our schedulers call her to set up an appointment, or she can always call our clinic at a later time to establish care ([phone]).  ? Consider an adjustment to her medications for her mood/anxiety. She experienced side effects with increased dosing of Cymbalta; as such, consider an alternative medication for anxiety (e.g., a selective serotonin reuptake inhibitor). This may help with her daytime anxiety as well as the heightened anxiety she experiences at night (she fears her sleep medications will make her too sedated to respond to her 's needs, but her anxiety about this keeps her from  sleeping and increases her hypervigilance at night as well). This recommendation will be deferred to her PCP.  ? Utilizing relaxation practices. I will mail her a handout with some techniques she may try on her own.  ? Attending caregiving support groups. She may find these through the Alzheimer's Association website or hotline (https://www.alz.org/ or by calling 484-818-4822 ) or through the [employer] Line ([place]://[place].gov/[place]-ReserveMyHome/ or by calling 271-428-2132).     2) In addition, a referral to a  or  or calling the [employer] Line may be helpful to assist her in seeking out respite services or formal in-home care giving services to allow Ms. De La Rosa to take a break or re-engage in her hobbies and interests.     3) Ms. De La Rosa is also encouraged to follow up with her medical providers for ongoing pain management. Improvements in her pain may help her stress level and cognitive functioning to some degree.     4) Ms. De La Rosa should adhere closely to her medication regimen in order to manage her cerebrovascular risk factors (hyperlipidemia), pain, and mood. This may help to prevent future cognitive decline.     5) The patient is encouraged to remain physically, socially, and mentally active for optimal brain health. This may also help to improve her emotional wellbeing.     6) From a neurocognitive standpoint, I have no concerns about the patient's ability to independently perform IADLs or care for her .      7) Cognitive strategies may be helpful for her own reassurance. These may include utilizing note pads, checklists, to-do lists, alarm reminders, a detailed calendar/planner, a GPS, a pillbox, and maintaining a daily morning and nighttime routine and an organized living environment. She should avoid multi-tasking when possible and should attempt to reduce as much distraction as possible while performing complex or important tasks.     8) Neuropsychological follow-up is  not warranted at this time. However, the current test data can now serve as a baseline should a repeat assessment be indicated in the future     PERTINENT LABS  Following labs were reviewed:  Admission on 08/16/2021, Discharged on 08/16/2021   Component Date Value     Color Urine 08/16/2021 Colorless      Appearance Urine 08/16/2021 Clear      Glucose Urine 08/16/2021 Negative      Bilirubin Urine 08/16/2021 Negative      Ketones Urine 08/16/2021 Negative      Specific Gravity Urine 08/16/2021 1.008      Blood Urine 08/16/2021 Negative      pH Urine 08/16/2021 6.0      Protein Albumin Urine 08/16/2021 Negative      Urobilinogen Urine 08/16/2021 <2.0      Nitrite Urine 08/16/2021 Negative      Leukocyte Esterase Urine 08/16/2021 Negative      Mucus Urine 08/16/2021 Present*     RBC Urine 08/16/2021 <1      WBC Urine 08/16/2021 <1          Total time spent for face to face visit, reviewing labs/imaging studies, counseling and coordination of care was: 45 Minutes spent on the date of the encounter doing chart review, review of outside records, review of test results, interpretation of tests, patient visit, documentation and discussion with family       This note was dictated using voice recognition software.  Any grammatical or context distortions are unintentional and inherent to the software.    Orders Placed This Encounter   Procedures     MR Brain w/o & w Contrast     Folate     Homocysteine     Methylmalonic Acid     Vitamin B1 (Thiamine)  Plasma ([facility])     Vitamin B12     EEG Routine      New Prescriptions    No medications on file     Modified Medications    No medications on file                     Again, thank you for allowing me to participate in the care of your patient.        Sincerely,        Augustin Terry MD

## 2021-08-27 NOTE — NURSING NOTE
Chief Complaint   Patient presents with     Memory Loss     Feels she is declining but a lot of stressors in life      Leonora Martinez CMA on 8/27/2021 at 9:30 AM

## 2021-08-27 NOTE — PROGRESS NOTES
NEUROLOGY FOLLOW UP VISIT  NOTE       [facility] NEUROLOGY [place]  [address] [place] [address]  Tel: [phone]  Fax: [phone]  [email or web address]     Leonardo De La Rosa,  1945, MRN [id number]  PCP: Cassie Ortega  Date: 2021      ASSESSMENT & PLAN     Visit Diagnosis  Pseudodementia     Pseudodementia  75-year-old female with SNHL, osteoporosis, good who was previously evaluated for cognitive decline.  She had extensive work-up including MRI, MRA, lab work and neuropsychological testing that was normal.  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  She was kept on Cymbalta and since her last visit had an E consult with psychiatry and adjustment in her psych medication was made.  After repeat MRI scan I plan on repeating in person neuropsychological testing as daughter is concerned that there clearly is significant cognitive decline and she is struggling with activities of daily living.  Additionally, EEG will be done    Frequent Falls  Since her last visit she was seen at [facility] and had MRI of cervical spine, EMG and MRI lumbar spine that did not show any high-grade stenosis.  They felt her weakness was related to hip flexor involvement due to spinal canal stenosis and was sent for physical therapy.  I have recommended checking MRI of the brain, folate, B12 and methylmalonic acid level.  She was encouraged to use a 4 pronged cane and to continue with physical therapy.    Thank you again for this referral, please feel free to contact me if you have any questions.    Augustin Terry MD  [facility]  (Formerly, [facility]     HISTORY OF PRESENT ILLNESS     Patient is a 75-year-old female with history of sensorineural hearing loss, osteoporosis, GERD last seen on 2021 who returns for follow-up for her pseudodementia.  Family was  concerned about progressive cognitive decline and frequent fall at times not remembering events leading up to her fall.  Daughters were concerned that she was having memory difficulty and tends to get confused easily and fell down not remembering how the fall occurred.  She is under a lot of stress as she is taking care of her  who has dementia, history of intracranial hemorrhage that is taking an emotional toll.  According to family members her  is the high maintenance due to cultural issues and couple is having difficulty managing their life and later part of of their lives.  Work-up included MRI of the brain and MRA that was normal.  Lab work included normal vitamin B12, TSH, vitamin B1, MMA, homocystine, folic acid and alpha-tocopherol.  Although she scored 23/30 on Carlos Eduardo cognitive assessment her neuropsychological testing did not find any cognitive disorder and it was felt patient has adjustment disorder with anxiety.  She was on Cymbalta and previously was confused about the dose of Cymbalta she was taking.  Her daughter called recently and wanted her to come in due to declining neuro status.  According to patient since her last visit she was seen at [facility] and apparently had MRI and EMG and was referred to [facility].  Daughter is concerned that her gait and cognition has declined significantly.  Her  was in the hospital and she is struggling to manage her  and her medical issues.  She has fallen on few times and was referred to physical therapy and [facility].  They had recommended a cane but patient is embarrassed to use it.     PROBLEM LIST   Patient Active Problem List   Diagnosis Code     Gastroesophageal reflux disease K21.9     Osteoporosis M81.0     Sensorineural hearing loss, bilateral H90.3     Spinal stenosis M48.00     HLD (hyperlipidemia) E78.5     Pseudodementia R41.89         PAST MEDICAL & SURGICAL HISTORY     Past Medical  "History:   Patient  has a past medical history of Arthritis, PONV (postoperative nausea and vomiting), Postsurgical arthrodesis status (4/23/2013), and Status post lumbar spinal fusion (2/24/2020).    Surgical History:  She  has a past surgical history that includes TOTAL KNEE ARTHROPLASTY; Foot surgery; Hysterectomy total abdominal, bilateral salpingo-oophorectomy, combined; Arthrodesis foot (4/22/2013); Bunionectomy lapidus with tarsal metatarsal (TMT) fusion (4/22/2013); Hysterectomy; knee surgery; and Foot surgery.     SOCIAL HISTORY     Reviewed, and she  reports that she has never smoked. She has never used smokeless tobacco. She reports that she does not drink alcohol and does not use drugs.     FAMILY HISTORY     Reviewed, and family history includes Coronary Artery Disease in her mother.     ALLERGIES     Allergies   Allergen Reactions     Estratest H.S. Other (See Comments) and Unknown     Other reaction(s): Other (see comments)  Increase lipids  Outside source did not list reaction  Increase lipids       Propofol Nausea and Vomiting     Outside source states \"injectable and inhaled\"  Outside source states \"injectable and inhaled\"  Outside source states \"injectable and inhaled\"  Outside source states \"injectable and inhaled\"       Reglan      Estrogens Rash     Other reaction(s): Other (see comments)  Outside source did not list reaction  Estrogen patches, from patch only, ( Adhesive )  does tolerate estrogen other forms        Penicillins Nausea and Vomiting and Nausea     Other reaction(s): GI intolerance, GI Upset  Patient states upset stomach.  Patient states upset stomach.  Patient states upset stomach.  Patient states upset stomach.           REVIEW OF SYSTEMS     A 12 point review of system was performed and was negative except as outlined in the history of present illness.     HOME MEDICATIONS     Current Outpatient Rx   Medication Sig Dispense Refill     acetaminophen (TYLENOL) 325 MG tablet Take " "3 tablets by mouth every 8 hours. 100 tablet 0     calcium carbonate-vitamin D (CALCIUM + D) 600-200 MG-UNIT TABS Take 1 tablet by mouth daily.       Celecoxib (CELEBREX PO) Take 200 mg by mouth daily.       Cholecalciferol (VITAMIN D3 PO) Take 1,000 Units by mouth daily.       denosumab (PROLIA) 60 MG/ML SOSY injection Inject 60 mg Subcutaneous once       DULoxetine (CYMBALTA) 60 MG capsule Take 60 mg by mouth daily        famotidine (PEPCID) 40 MG tablet TAKE 1 TABLET BY MOUTH TWICE A DAY       fluticasone (FLONASE) 50 MCG/ACT nasal spray INSTILL 1 SPRAY INTO EACH NOSTRIL ONCE DAILY AS NEEDED FOR RHINITIS       glucosamine-chondroitin 500-400 MG CAPS Take 1 capsule by mouth daily.       losartan (COZAAR) 100 MG tablet Take 100 mg by mouth       methocarbamol (ROBAXIN) 500 MG tablet TAKE 1 TABLET BY MOUTH 3 TIMES A DAY       multivitamin, therapeutic with minerals (MULTI-VITAMIN) TABS Take 1 tablet by mouth daily.       naproxen sodium 220 MG capsule Take 220 mg by mouth 2 times daily (with meals)       Omega-3 Fatty Acids (OMEGA-3 FISH OIL PO) Take 1,200 g by mouth every 7 days        OMEPRAZOLE PO        pravastatin (PRAVACHOL) 40 MG tablet Take 40 mg by mouth       pregabalin (LYRICA) 100 MG capsule Take 100 mg by mouth 2 times daily        zolpidem (AMBIEN) 5 MG tablet Take 5 mg by mouth       Chlorpheniramine-Phenylephrine 4-10 MG TABS        fluocin-hydroquinone-tretinoin (TRI-RODNEY) 0.01-4-0.05 % CREA Externally apply topically At Bedtime 30 g 3     medical cannabis (Patient's own supply) See Admin Instructions (The purpose of this order is to document that the patient reports taking medical cannabis.  This is not a prescription, and is not used to certify that the patient has a qualifying medical condition.) (Patient not taking: Reported on 8/27/2021)           PHYSICAL EXAM     Vital signs  BP (!) 140/71 (BP Location: Right arm, Patient Position: Sitting)   Pulse 89   Ht 1.626 m (5' 4\")   Wt 63 kg (139 " lb)   BMI 23.86 kg/m      Weight:   139 lbs 0 oz    Patient is alert and oriented x4 in no acute distress. Vital signs were reviewed and are documented in electronic medical record. Neck was supple, no carotid bruits, thyromegaly, JVD, or lymphadenopathy was noted.   NEUROLOGY EXAM:    Patient s speech was normal with no aphasia or dysarthria.  She appears somewhat confused    Funduscopic exam was normal, with normal cup to disc ratio. Cranial nerves II -XII were intact.  Except she is hard of hearing    Patient had normal mass, tone with 5/5 strength in the upper extremity proximally in the lower extremity 4+/5 distally 5/5    Sensation was intact to light touch, pinprick, and vibratory sensation.     Reflexes were 0 symmetrical with downgoing toes.     No dysmetria noted on FNF or HKS.     She has a wide-based gait and has trouble tandem walking Romberg negative     DIAGNOSTIC STUDIES     PERTINENT RADIOLOGY  Following imaging studies were reviewed:     CT BRAIN 12/17/2020  1.  No acute intracranial process.  2.  Minimal chronic small vessel ischemic disease.     CT LUMBAR SPINE 9/23/2020  1. New but subacute to chronic appearing fractures of L2, including mild  superior endplate compression and incompletely healed bilateral pedicle  fractures, which on the left extend into the posterosuperior vertebral body.  2. Postoperative changes including anterior and posterior instrumented fusion at  L5-S1. No hardware failure. No advanced spinal canal or neural foraminal  narrowing at any level.     MRI CERVICAL SPINE 7/12/2021  1.  Mild central canal and left foraminal narrowing at C6-7  2.  Mild degenerative changes within the remainder of the cervical spine as above    MRI, MRA BRAIN 2/26/2021  HEAD MRI:   1.  Mild chronic small vessel ischemic changes and age-related changes.  2.  Otherwise normal brain MRI.    HEAD MRA:   1.  Normal MRA [place].    NECK MRA:  1.  Minimal plaque in the right carotid bulb, but  no significant associated stenosis by NASCET criteria.  2.   Otherwise normal neck MRA.     MRI LUMBAR SPINE 9/23/2020  No new spondylolisthesis since 05/04/2020. Interval subacute  appearing superior L2 compression fracture and bilateral pedicle fractures.  Stable chronic healing fracture through the posterolateral left L3 vertebral  body and pedicle.     EMG 7/13/2021  1.  This is a normal electrodiagnostic study of bilateral lower extremities  2.  Specifically there is no electrodiagnostic evidence of a lumbar radiculopathy, plexopathy or peripheral apathy  NEUROPSYCHOLOGICAL EVALUATION 3/25/2021  No Cognitive Disorder     Adjustment Disorder with Anxiety     RECOMMENDATIONS:  1) Stress management strategies are strongly encouraged, as improvement in emotional distress will lead to perceived improvements in her cognitive functioning (and even her physical pain level to some degree) over time. Such strategies may include:  Participating in individual psychotherapy. If the patient is amenable, I will refer her to our psychologist (Dr. Flavia Rivera). I will have our schedulers call her to set up an appointment, or she can always call our clinic at a later time to establish care ([phone]).  Consider an adjustment to her medications for her mood/anxiety. She experienced side effects with increased dosing of Cymbalta; as such, consider an alternative medication for anxiety (e.g., a selective serotonin reuptake inhibitor). This may help with her daytime anxiety as well as the heightened anxiety she experiences at night (she fears her sleep medications will make her too sedated to respond to her 's needs, but her anxiety about this keeps her from sleeping and increases her hypervigilance at night as well). This recommendation will be deferred to her PCP.  Utilizing relaxation practices. I will mail her a handout with some techniques she may try on her own.  Attending caregiving support groups. She may find  these through the Alzheimer's Association website or hotline (https://www.alz.org/ or by calling 779-142-6771 ) or through the [employer] Line (https://[place].gov/dotloop-linkage-line/ or by calling 611-033-0818).     2) In addition, a referral to a  or  or calling the [employer] Linkage Line may be helpful to assist her in seeking out respite services or formal in-home care giving services to allow Ms. De La Rosa to take a break or re-engage in her hobbies and interests.     3) Ms. De La Rosa is also encouraged to follow up with her medical providers for ongoing pain management. Improvements in her pain may help her stress level and cognitive functioning to some degree.     4) Ms. De La Rosa should adhere closely to her medication regimen in order to manage her cerebrovascular risk factors (hyperlipidemia), pain, and mood. This may help to prevent future cognitive decline.     5) The patient is encouraged to remain physically, socially, and mentally active for optimal brain health. This may also help to improve her emotional wellbeing.     6) From a neurocognitive standpoint, I have no concerns about the patient's ability to independently perform IADLs or care for her .      7) Cognitive strategies may be helpful for her own reassurance. These may include utilizing note pads, checklists, to-do lists, alarm reminders, a detailed calendar/planner, a GPS, a pillbox, and maintaining a daily morning and nighttime routine and an organized living environment. She should avoid multi-tasking when possible and should attempt to reduce as much distraction as possible while performing complex or important tasks.     8) Neuropsychological follow-up is not warranted at this time. However, the current test data can now serve as a baseline should a repeat assessment be indicated in the future     PERTINENT LABS  Following labs were reviewed:  Admission on 08/16/2021, Discharged on 08/16/2021   Component Date Value      Color Urine 08/16/2021 Colorless      Appearance Urine 08/16/2021 Clear      Glucose Urine 08/16/2021 Negative      Bilirubin Urine 08/16/2021 Negative      Ketones Urine 08/16/2021 Negative      Specific Gravity Urine 08/16/2021 1.008      Blood Urine 08/16/2021 Negative      pH Urine 08/16/2021 6.0      Protein Albumin Urine 08/16/2021 Negative      Urobilinogen Urine 08/16/2021 <2.0      Nitrite Urine 08/16/2021 Negative      Leukocyte Esterase Urine 08/16/2021 Negative      Mucus Urine 08/16/2021 Present*     RBC Urine 08/16/2021 <1      WBC Urine 08/16/2021 <1          Total time spent for face to face visit, reviewing labs/imaging studies, counseling and coordination of care was: 45 Minutes spent on the date of the encounter doing chart review, review of outside records, review of test results, interpretation of tests, patient visit, documentation and discussion with family     This note was dictated using voice recognition software.  Any grammatical or context distortions are unintentional and inherent to the software.    Orders Placed This Encounter   Procedures     MR Brain w/o & w Contrast     Folate     Homocysteine     Methylmalonic Acid     Vitamin B1 (Thiamine)  Plasma ([facility])     Vitamin B12     EEG Routine      New Prescriptions    No medications on file     Modified Medications    No medications on file

## 2021-09-05 ENCOUNTER — HEALTH MAINTENANCE LETTER (OUTPATIENT)
Age: 76
End: 2021-09-05

## 2021-09-14 ENCOUNTER — TELEPHONE (OUTPATIENT)
Dept: NEUROLOGY | Facility: CLINIC | Age: 76
End: 2021-09-14

## 2021-09-14 NOTE — TELEPHONE ENCOUNTER
EXAM DATE: 09/13/2021  EXAM: MRI HEAD W/O and WITH CONTRAST  LOCATION: [facility]  DATE/TIME: 9/13/2021 11:00 AM  INDICATION: MEMORY LOSS, HEADACHES FEW MONTHS  COMPARISON: 2/26/21 [facility]  CONTRAST: 13 mL of IV Dotarem was administered from a single use vial with 2 mL discarded. MWR I-STAT:  Cr=0.7mg/dL eGFR=82  TECHNIQUE: Routine multiplanar multisequence head MRI without and with intravenous contrast.  FINDINGS:  INTRACRANIAL CONTENTS: No abnormal restricted diffusion to suggest acute infarct. Few scattered foci of  signal abnormality within the cerebral hemispheric white matter which are nonspecific, though most commonly  ascribed to chronic small vessel ischemic disease. The ventricles and sulci are prominent consistent with mild  brain parenchymal volume loss. No evidence of acute intracranial hemorrhage, mass effect, or extra-axial  collection. No abnormal brain parenchymal or left meningeal enhancement. No cerebellar tonsillar ectopia.  SELLA: No abnormality accounting for technique.  OSSEOUS STRUCTURES/SOFT TISSUES: The visualized skull base and calvarium are unremarkable.  Expected signal voids within the distal vertebral, basilar, and bilateral internal carotid arteries.  ORBITS: Bilateral cataract surgery  SINUSES/MASTOIDS: The partially imaged paranasal sinuses, mastoid air cells and middle ear cavities are  Unremarkable.    IMPRESSION:  1. No evidence of acute intracranial hemorrhage, mass effect, or infarction.  2. Mild nonspecific white matter changes.  3. Mild brain parenchymal volume loss.

## 2021-09-17 ENCOUNTER — TRANSFERRED RECORDS (OUTPATIENT)
Dept: HEALTH INFORMATION MANAGEMENT | Facility: CLINIC | Age: 76
End: 2021-09-17

## 2021-09-20 ENCOUNTER — OFFICE VISIT (OUTPATIENT)
Dept: NEUROLOGY | Facility: CLINIC | Age: 76
End: 2021-09-20
Attending: PSYCHIATRY & NEUROLOGY
Payer: MEDICARE

## 2021-09-20 VITALS
WEIGHT: 139 LBS | HEIGHT: 64 IN | DIASTOLIC BLOOD PRESSURE: 94 MMHG | BODY MASS INDEX: 23.73 KG/M2 | SYSTOLIC BLOOD PRESSURE: 170 MMHG | HEART RATE: 73 BPM

## 2021-09-20 DIAGNOSIS — R41.89 PSEUDODEMENTIA: Primary | ICD-10-CM

## 2021-09-20 DIAGNOSIS — M47.816 LUMBAR SPONDYLOSIS: ICD-10-CM

## 2021-09-20 DIAGNOSIS — R41.89 PSEUDODEMENTIA: ICD-10-CM

## 2021-09-20 PROCEDURE — 99214 OFFICE O/P EST MOD 30 MIN: CPT | Performed by: PSYCHIATRY & NEUROLOGY

## 2021-09-20 ASSESSMENT — MIFFLIN-ST. JEOR: SCORE: 1115.5

## 2021-09-20 NOTE — NURSING NOTE
Chief Complaint   Patient presents with     Pseudodementia     DIscuss EEG, lab and MRI results      Frequent Falls     Leonora Martinez CMA on 9/20/2021 at 2:33 PM

## 2021-09-20 NOTE — PATIENT INSTRUCTIONS
Patient Education   Coping with Bone Loss  What causes bone loss?  Certain medicines, cancers and lifestyle choices (such as smoking, alcohol abuse and too much weight loss) can cause your bones to become weak and brittle. Women and people over age 65 have an increased risk for bone loss.  How is it treated?  Medicines called bone modifying agents may reduce or delay bone loss. These are given by IV (through a tiny needle in the vein), subcutaneous (injection in fat tissue), by mouth or as nasal spray. Your care team can tell you if medicine is right for you.  If bone loss is causing you pain, your doctor may prescribe pain medicine.  What else can I do to treat or prevent bone loss?    Take calcium and vitamin D pills.    Eat foods with calcium:  ? Yogurt, milk, cottage cheese  ? Dark green vegetables  ? Orange juice with calcium and vitamin D  ? Canned salmon  ? Dried beans and peas.    Eat foods with vitamin D:  ? Oily fish (salmon, mackeral or tuna)  ? Cereal with vitamin D added  ? Egg yolks.    Do weight-bearing exercise: walking, jogging, stair climbing, tennis or Santhosh Chi.  If you already have bone loss, it is important to prevent falls.    Use a cane or walker if you are weak or unsteady.    Install railings in the tub or shower and next to the toilet.    Keep your home well lighted. Avoid clutter.  Ask your care team about pain medicine if you have pain resulting from bone loss. Other ways to manage pain include:    Hot and cold compresses    Massage    Relaxation exercises    Restricting movement in the painful area.  When should I call my care team?  Call your care team if:    You need help managing your pain.    You have fallen.    You need a cane, a walker, handrails or other assistive  "devices.  Comments:  __________________________________________  __________________________________________  __________________________________________  __________________________________________  __________________________________________  __________________________________________  For informational purposes only. Not to replace the advice of your health care provider. Copyright   2006 [facility]. All rights reserved. Clinically reviewed by [facility]. [employer] [id number] - REV 04/19        Patient Education   Coping with Memory Loss  What happens when you have memory loss?  Memory loss causes problems with thinking, learning and memory. You may feel forgetful or have trouble focusing on tasks.  Memory loss may be a side effect of medicine, chemotherapy or radiation. In these cases, problems with memory may improve after you finish your treatment. But you could have long-term problems.  Other factors that affect memory and your ability to focus include:    Aging    Illness    Depression    Hormonal changes    Anemia (low red blood cells)    Stress.  What can I do to treat or prevent memory loss?  Problems with thinking and memory can be very frustrating. There is no standard treatment at this time. But there are things you can do to reduce the effects of memory loss:    Really pay attention. Use your eyes, ears and sense of touch to remember things. Focus when someone tells you something.    Limit distractions when you need to complete tasks that require you to focus.    Tell yourself what you are doing as you do it. For example, if you're going out for a few hours, as you close the door tell yourself, \"I'm locking the door now and putting the key in my pocket so I don't have to worry about whether I locked the door.\"    Give yourself clear reminders. If you need to buy dog food, put the empty bag at the door so you'll see it as you leave the house. Or write yourself a note and put it where it's " needed.    Keep things in the same place. This way you don't have to wonder where you put your keys, remote control or medicine.    Keep a journal of daily events and activities. Carry a notebook and write down important information that you want to remember.    Exercise your brain. For example, do crossword puzzles, painting, sudoku.    Use word play and rhyming to help remember things.    Use helpful tools, such as:  ? A daily planner  ? A wall calendar  ? Checklists  ? Sticky notes  ? A  near the door  ? A pre-programmed phone  ? A wristwatch with an alarm  ? A pill box to keep track of your medicines.    Get plenty of sleep and exercise each day.    Stay positive, and try to manage stress.    If you think you may be depressed, talk to your doctor. Depression should be treated.  When should I call my care team?  Call your care team if:    You feel depressed.    You cannot complete basic daily activities.  Comments:  __________________________________________  __________________________________________  __________________________________________  __________________________________________  __________________________________________  __________________________________________  __________________________________________  For informational purposes only. Not to replace the advice of your health care provider. Copyright   2006 [employer]. All rights reserved. Clinically reviewed by [facility]. [employer] [id number] - REV 04/19.

## 2021-09-20 NOTE — NURSING NOTE
EXAM DATE: 09/13/2021  EXAM: MRI HEAD W/O and WITH CONTRAST  LOCATION: [facility]  DATE/TIME: 9/13/2021 11:00 AM  INDICATION: MEMORY LOSS, HEADACHES FEW MONTHS  COMPARISON: 2/26/21 [facility]  CONTRAST: 13 mL of IV Dotarem was administered from a single use vial with 2 mL discarded. MWR I-STAT:  Cr=0.7mg/dL eGFR=82  TECHNIQUE: Routine multiplanar multisequence head MRI without and with intravenous contrast.  FINDINGS:  INTRACRANIAL CONTENTS: No abnormal restricted diffusion to suggest acute infarct. Few scattered foci of  signal abnormality within the cerebral hemispheric white matter which are nonspecific, though most commonly  ascribed to chronic small vessel ischemic disease. The ventricles and sulci are prominent consistent with mild  brain parenchymal volume loss. No evidence of acute intracranial hemorrhage, mass effect, or extra-axial  collection. No abnormal brain parenchymal or left meningeal enhancement. No cerebellar tonsillar ectopia.  SELLA: No abnormality accounting for technique.  OSSEOUS STRUCTURES/SOFT TISSUES: The visualized skull base and calvarium are unremarkable.  Expected signal voids within the distal vertebral, basilar, and bilateral internal carotid arteries.  ORBITS: Bilateral cataract surgery  SINUSES/MASTOIDS: The partially imaged paranasal sinuses, mastoid air cells and middle ear cavities are  Unremarkable.     IMPRESSION:  1. No evidence of acute intracranial hemorrhage, mass effect, or infarction.  2. Mild nonspecific white matter changes.  3. Mild brain parenchymal volume loss.

## 2021-09-20 NOTE — LETTER
2021         RE: Leonardo De La Rosa  [address] [place] [address]        Dear Colleague,    Thank you for referring your patient, Leonardo De La Rosa, to the [facility] [place]. Please see a copy of my visit note below.    NEUROLOGY FOLLOW UP VISIT  NOTE       [facility] [place]  [address] [place] [address]  Tel: [phone]  Fax: [phone]  [email or web address]     Leonardo De La Rosa,  1945, MRN [id number]  PCP: Cassie Ortega  Date: 2021      ASSESSMENT & PLAN     Visit Diagnosis  1. Pseudodementia     Pseudodementia  75-year-old female with SNHL, osteoporosis, good who was previously evaluated for cognitive decline.  She had extensive work-up including MRI, MRA, lab work and neuropsychological testing that was normal.  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  Since her last visit she had repeat MRI of the brain and lab work that included normal vitamin B1, B12, methylmalonic acid level, homocystine and folic acid.  She previously had a virtual neuropsychological testing but due to significant cognitive issues I have recommended in person neuropsychological evaluation.  She also had an EEG done today that was within normal limits.  I have asked her to continue on Cymbalta 60 mg daily    Frequent Falls  Patient was seen at [facility] and had MRI of cervical spine, EMG and MRI lumbar spine that did not show any high-grade stenosis.  They felt her weakness was related to hip flexor involvement due to spinal canal stenosis and was sent for physical therapy.  She is continuing with physical therapy.  I have encouraged her to use a 4 pronged cane and I suspect her frequent falls are due to deconditioning and hopefully will improve with therapy.  Follow-up will be after the neuropsychological testing    Thank you again for this referral, please feel free to  contact me if you have any questions.    Augustin Terry MD  [facility] NEUROLOGY, [place]  (Formerly, [facility]     HISTORY OF PRESENT ILLNESS     Patient is a 75-year-old female with history of sensorineural hearing loss, osteoporosis, GERD last seen on April 12, 2021 who returns for follow-up for her pseudodementia.  Family was concerned about progressive cognitive decline and frequent fall at times not remembering events leading up to her fall.  Daughters were concerned that she was having memory difficulty and tends to get confused easily and fell down not remembering how the fall occurred.  She is under a lot of stress as she is taking care of her  who has dementia, H/O intracranial hemorrhage that is taking a huge emotional toll.  According to family members her  is  high maintenance due to cultural their issues and couple is having difficulty managing their life in [place] in later part of of their lives.  Work-up in the past included MRI of the brain and MRA that was normal.  Lab work included normal vitamin B12, TSH, vitamin B1, MMA, homocystine, folic acid and alpha-tocopherol.  Although she scored 23/30 on Carlos Eduardo cognitive assessment her neuropsychological testing did not find any cognitive disorder and it was felt patient had adjustment disorder with anxiety.  She was on Cymbalta and previously was confused about the dose of Cymbalta she was taking.  They also saw a neurologist at [facility] for a second opinion and  had MRI and EMG and was referred to [facility].  Daughter is concerned that her gait and cognition has declined significantly.  Her  was in the hospital and she is struggling to manage her  and her medical issues.  She has fallen on few times and was referred to physical therapy and [facility].  They had recommended a cane but patient is embarrassed to use it.  Since her last visit, she had MRI  "of the brain that showed no acute abnormality.  Lab work included normal vitamin B12, vitamin B1, methylmalonic acid level, homocystine and folic acid     PROBLEM LIST   Patient Active Problem List   Diagnosis Code     Gastroesophageal reflux disease K21.9     Osteoporosis M81.0     Sensorineural hearing loss, bilateral H90.3     Spinal stenosis M48.00     HLD (hyperlipidemia) E78.5     Pseudodementia R41.89         PAST MEDICAL & SURGICAL HISTORY     Past Medical History:   Patient  has a past medical history of Arthritis, PONV (postoperative nausea and vomiting), Postsurgical arthrodesis status (4/23/2013), and Status post lumbar spinal fusion (2/24/2020).    Surgical History:  She  has a past surgical history that includes TOTAL KNEE ARTHROPLASTY; Foot surgery; Hysterectomy total abdominal, bilateral salpingo-oophorectomy, combined; Arthrodesis foot (4/22/2013); Bunionectomy lapidus with tarsal metatarsal (TMT) fusion (4/22/2013); Hysterectomy; knee surgery; and Foot surgery.     SOCIAL HISTORY     Reviewed, and she  reports that she has never smoked. She has never used smokeless tobacco. She reports that she does not drink alcohol and does not use drugs.     FAMILY HISTORY     Reviewed, and family history includes Coronary Artery Disease in her mother.     ALLERGIES     Allergies   Allergen Reactions     Estratest H.S. Other (See Comments) and Unknown     Other reaction(s): Other (see comments)  Increase lipids  Outside source did not list reaction  Increase lipids       Propofol Nausea and Vomiting     Outside source states \"injectable and inhaled\"  Outside source states \"injectable and inhaled\"  Outside source states \"injectable and inhaled\"  Outside source states \"injectable and inhaled\"       Reglan      Estrogens Rash     Other reaction(s): Other (see comments)  Outside source did not list reaction  Estrogen patches, from patch only, ( Adhesive )  does tolerate estrogen other forms        Penicillins Nausea " and Vomiting and Nausea     Other reaction(s): GI intolerance, GI Upset  Patient states upset stomach.  Patient states upset stomach.  Patient states upset stomach.  Patient states upset stomach.           REVIEW OF SYSTEMS     A 12 point review of system was performed and was negative except as outlined in the history of present illness.     HOME MEDICATIONS     Current Outpatient Rx   Medication Sig Dispense Refill     acetaminophen (TYLENOL) 325 MG tablet Take 3 tablets by mouth every 8 hours. 100 tablet 0     calcium carbonate-vitamin D (CALCIUM + D) 600-200 MG-UNIT TABS Take 1 tablet by mouth daily.       Celecoxib (CELEBREX PO) Take 200 mg by mouth daily.       Chlorpheniramine-Phenylephrine 4-10 MG TABS        Cholecalciferol (VITAMIN D3 PO) Take 1,000 Units by mouth daily.       denosumab (PROLIA) 60 MG/ML SOSY injection Inject 60 mg Subcutaneous once       DULoxetine (CYMBALTA) 60 MG capsule Take 60 mg by mouth daily        famotidine (PEPCID) 40 MG tablet TAKE 1 TABLET BY MOUTH TWICE A DAY       fluocin-hydroquinone-tretinoin (TRI-RODNEY) 0.01-4-0.05 % CREA Externally apply topically At Bedtime 30 g 3     fluticasone (FLONASE) 50 MCG/ACT nasal spray INSTILL 1 SPRAY INTO EACH NOSTRIL ONCE DAILY AS NEEDED FOR RHINITIS       glucosamine-chondroitin 500-400 MG CAPS Take 1 capsule by mouth daily.       losartan (COZAAR) 100 MG tablet Take 100 mg by mouth       methocarbamol (ROBAXIN) 500 MG tablet TAKE 1 TABLET BY MOUTH 3 TIMES A DAY       multivitamin, therapeutic with minerals (MULTI-VITAMIN) TABS Take 1 tablet by mouth daily.       naproxen sodium 220 MG capsule Take 220 mg by mouth 2 times daily (with meals)       Omega-3 Fatty Acids (OMEGA-3 FISH OIL PO) Take 1,200 g by mouth every 7 days        OMEPRAZOLE PO        pravastatin (PRAVACHOL) 40 MG tablet Take 40 mg by mouth       pregabalin (LYRICA) 100 MG capsule Take 100 mg by mouth 2 times daily        zolpidem (AMBIEN) 5 MG tablet Take 5 mg by mouth        "medical cannabis (Patient's own supply) See Admin Instructions (The purpose of this order is to document that the patient reports taking medical cannabis.  This is not a prescription, and is not used to certify that the patient has a qualifying medical condition.)            PHYSICAL EXAM     Vital signs  BP (!) 170/94 (BP Location: Right arm, Patient Position: Sitting)   Pulse 73   Ht 1.626 m (5' 4\")   Wt 63 kg (139 lb)   BMI 23.86 kg/m      Weight:   139 lbs 0 oz    Patient is alert and oriented x4 in no acute distress. Vital signs were reviewed and are documented in electronic medical record. Neck was supple, no carotid bruits, thyromegaly, JVD, or lymphadenopathy was noted.   NEUROLOGY EXAM:    Patient s speech was normal with no aphasia or dysarthria.  She appears somewhat confused    Funduscopic exam was normal, with normal cup to disc ratio. Cranial nerves II -XII were intact.  Except she is hard of hearing    Patient had normal mass, tone with 5/5 strength in the upper extremity proximally in the lower extremity 4+/5 distally 5/5    Sensation was intact to light touch, pinprick, and vibratory sensation.     Reflexes were 0 symmetrical with downgoing toes.     No dysmetria noted on FNF or HKS.     She has a wide-based gait and has trouble tandem walking Romberg negative     DIAGNOSTIC STUDIES     PERTINENT RADIOLOGY  Following imaging studies were reviewed:     CT BRAIN 12/17/2020  1.  No acute intracranial process.  2.  Minimal chronic small vessel ischemic disease.     CT LUMBAR SPINE 9/23/2020  1. New but subacute to chronic appearing fractures of L2, including mild  superior endplate compression and incompletely healed bilateral pedicle  fractures, which on the left extend into the posterosuperior vertebral body.  2. Postoperative changes including anterior and posterior instrumented fusion at  L5-S1. No hardware failure. No advanced spinal canal or neural foraminal  narrowing at any level.     MRI " BRAIN 9/13/2021  1. No evidence of acute intracranial hemorrhage, mass effect, or infarction.  2. Mild nonspecific white matter changes.  3. Mild brain parenchymal volume loss.    MRI CERVICAL SPINE 7/12/2021  1.  Mild central canal and left foraminal narrowing at C6-7  2.  Mild degenerative changes within the remainder of the cervical spine as above    MRI, MRA BRAIN 2/26/2021  HEAD MRI:   1.  Mild chronic small vessel ischemic changes and age-related changes.  2.  Otherwise normal brain MRI.    HEAD MRA:   1.  Normal MRA [place].    NECK MRA:  1.  Minimal plaque in the right carotid bulb, but no significant associated stenosis by NASCET criteria.  2.   Otherwise normal neck MRA.     MRI LUMBAR SPINE 9/23/2020  No new spondylolisthesis since 05/04/2020. Interval subacute  appearing superior L2 compression fracture and bilateral pedicle fractures.  Stable chronic healing fracture through the posterolateral left L3 vertebral  body and pedicle.     EMG 7/13/2021  1.  This is a normal electrodiagnostic study of bilateral lower extremities  2.  Specifically there is no electrodiagnostic evidence of a lumbar radiculopathy, plexopathy or peripheral apathy  NEUROPSYCHOLOGICAL EVALUATION 3/25/2021  No Cognitive Disorder     Adjustment Disorder with Anxiety     RECOMMENDATIONS:  1) Stress management strategies are strongly encouraged, as improvement in emotional distress will lead to perceived improvements in her cognitive functioning (and even her physical pain level to some degree) over time. Such strategies may include:  ? Participating in individual psychotherapy. If the patient is amenable, I will refer her to our psychologist (Dr. Flavia Rivera). I will have our schedulers call her to set up an appointment, or she can always call our clinic at a later time to establish care ([phone]).  ? Consider an adjustment to her medications for her mood/anxiety. She experienced side effects with increased dosing of  Cymbalta; as such, consider an alternative medication for anxiety (e.g., a selective serotonin reuptake inhibitor). This may help with her daytime anxiety as well as the heightened anxiety she experiences at night (she fears her sleep medications will make her too sedated to respond to her 's needs, but her anxiety about this keeps her from sleeping and increases her hypervigilance at night as well). This recommendation will be deferred to her PCP.  ? Utilizing relaxation practices. I will mail her a handout with some techniques she may try on her own.  ? Attending caregiving support groups. She may find these through the Alzheimer's Association website or hotline (https://www.alz.org/ or by calling 882-421-7003 ) or through the [employer] ([place]://[place].gov/[employer]/ or by calling 898-831-4570).     2) In addition, a referral to a  or  or calling the [employer] Line may be helpful to assist her in seeking out respite services or formal in-home care giving services to allow Ms. De La Rosa to take a break or re-engage in her hobbies and interests.     3) Ms. De La Rosa is also encouraged to follow up with her medical providers for ongoing pain management. Improvements in her pain may help her stress level and cognitive functioning to some degree.     4) Ms. De La Rosa should adhere closely to her medication regimen in order to manage her cerebrovascular risk factors (hyperlipidemia), pain, and mood. This may help to prevent future cognitive decline.     5) The patient is encouraged to remain physically, socially, and mentally active for optimal brain health. This may also help to improve her emotional wellbeing.     6) From a neurocognitive standpoint, I have no concerns about the patient's ability to independently perform IADLs or care for her .      7) Cognitive strategies may be helpful for her own reassurance. These may include utilizing note pads, checklists, to-do  lists, alarm reminders, a detailed calendar/planner, a GPS, a pillbox, and maintaining a daily morning and nighttime routine and an organized living environment. She should avoid multi-tasking when possible and should attempt to reduce as much distraction as possible while performing complex or important tasks.     8) Neuropsychological follow-up is not warranted at this time. However, the current test data can now serve as a baseline should a repeat assessment be indicated in the future     PERTINENT LABS  Following labs were reviewed:  Admission on 08/16/2021, Discharged on 08/16/2021   Component Date Value     Color Urine 08/16/2021 Colorless      Appearance Urine 08/16/2021 Clear      Glucose Urine 08/16/2021 Negative      Bilirubin Urine 08/16/2021 Negative      Ketones Urine 08/16/2021 Negative      Specific Gravity Urine 08/16/2021 1.008      Blood Urine 08/16/2021 Negative      pH Urine 08/16/2021 6.0      Protein Albumin Urine 08/16/2021 Negative      Urobilinogen Urine 08/16/2021 <2.0      Nitrite Urine 08/16/2021 Negative      Leukocyte Esterase Urine 08/16/2021 Negative      Mucus Urine 08/16/2021 Present*     RBC Urine 08/16/2021 <1      WBC Urine 08/16/2021 <1        Ref Range & Units 2 wk ago   VITAMIN B12 180 - 914 pg/mL 808      Ref Range & Units 2 wk ago    VITAMIN B1 WHL BLD 66.5 - 200.0 nmol/L 116.7       Ref Range & Units 2 wk ago   Methylmalonic Acid 0 - 378 nmol/L 318       Ref Range & Units 2 wk ago   HOMOCYSTEINE,TOTAL 5.0 - 15.4 umol/L 12.1      Ref Range & Units 2 wk ago   FOLIC ACID >=4.0 ng/mL >20.0        Total time spent for face to face visit, reviewing labs/imaging studies, counseling and coordination of care was: 30 Minutes spent on the date of the encounter doing chart review, review of outside records, review of test results, interpretation of tests, patient visit, documentation and discussion with family       This note was dictated using voice recognition software.  Any  grammatical or context distortions are unintentional and inherent to the software.    Orders Placed This Encounter   Procedures     Neuropsychology Referral      New Prescriptions    No medications on file     Modified Medications    No medications on file                   Again, thank you for allowing me to participate in the care of your patient.        Sincerely,        Augustin Terry MD

## 2021-09-20 NOTE — PROGRESS NOTES
NEUROLOGY FOLLOW UP VISIT  NOTE       [facility] [place]  [address] [place] [address]  Tel: [phone]  Fax: [phone]  [email or web address]     Leonardo De La Rosa,  1945, MRN [id number]  PCP: Cassie Ortega  Date: 2021      ASSESSMENT & PLAN     Visit Diagnosis  Pseudodementia     Pseudodementia  75-year-old female with SNHL, osteoporosis, good who was previously evaluated for cognitive decline.  She had extensive work-up including MRI, MRA, lab work and neuropsychological testing that was normal.  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  Since her last visit she had repeat MRI of the brain and lab work that included normal vitamin B1, B12, methylmalonic acid level, homocystine and folic acid.  She previously had a virtual neuropsychological testing but due to significant cognitive issues I have recommended in person neuropsychological evaluation.  She also had an EEG done today that was within normal limits.  I have asked her to continue on Cymbalta 60 mg daily    Frequent Falls  Patient was seen at [facility] and had MRI of cervical spine, EMG and MRI lumbar spine that did not show any high-grade stenosis.  They felt her weakness was related to hip flexor involvement due to spinal canal stenosis and was sent for physical therapy.  She is continuing with physical therapy.  I have encouraged her to use a 4 pronged cane and I suspect her frequent falls are due to deconditioning and hopefully will improve with therapy.  Follow-up will be after the neuropsychological testing    Thank you again for this referral, please feel free to contact me if you have any questions.    Augustin Terry MD  [facility]  (Formerly, [facility]     HISTORY OF PRESENT ILLNESS     Patient is a 75-year-old female with history of sensorineural hearing  loss, osteoporosis, GERD last seen on April 12, 2021 who returns for follow-up for her pseudodementia.  Family was concerned about progressive cognitive decline and frequent fall at times not remembering events leading up to her fall.  Daughters were concerned that she was having memory difficulty and tends to get confused easily and fell down not remembering how the fall occurred.  She is under a lot of stress as she is taking care of her  who has dementia, H/O intracranial hemorrhage that is taking a huge emotional toll.  According to family members her  is  high maintenance due to cultural their issues and couple is having difficulty managing their life in [place] in later part of of their lives.  Work-up in the past included MRI of the brain and MRA that was normal.  Lab work included normal vitamin B12, TSH, vitamin B1, MMA, homocystine, folic acid and alpha-tocopherol.  Although she scored 23/30 on [place] cognitive assessment her neuropsychological testing did not find any cognitive disorder and it was felt patient had adjustment disorder with anxiety.  She was on Cymbalta and previously was confused about the dose of Cymbalta she was taking.  They also saw a neurologist at [facility] for a second opinion and  had MRI and EMG and was referred to [facility].  Daughter is concerned that her gait and cognition has declined significantly.  Her  was in the hospital and she is struggling to manage her  and her medical issues.  She has fallen on few times and was referred to physical therapy and [facility].  They had recommended a cane but patient is embarrassed to use it.  Since her last visit, she had MRI of the brain that showed no acute abnormality.  Lab work included normal vitamin B12, vitamin B1, methylmalonic acid level, homocystine and folic acid     PROBLEM LIST   Patient Active Problem List   Diagnosis Code     Gastroesophageal reflux disease K21.9  "    Osteoporosis M81.0     Sensorineural hearing loss, bilateral H90.3     Spinal stenosis M48.00     HLD (hyperlipidemia) E78.5     Pseudodementia R41.89         PAST MEDICAL & SURGICAL HISTORY     Past Medical History:   Patient  has a past medical history of Arthritis, PONV (postoperative nausea and vomiting), Postsurgical arthrodesis status (4/23/2013), and Status post lumbar spinal fusion (2/24/2020).    Surgical History:  She  has a past surgical history that includes TOTAL KNEE ARTHROPLASTY; Foot surgery; Hysterectomy total abdominal, bilateral salpingo-oophorectomy, combined; Arthrodesis foot (4/22/2013); Bunionectomy lapidus with tarsal metatarsal (TMT) fusion (4/22/2013); Hysterectomy; knee surgery; and Foot surgery.     SOCIAL HISTORY     Reviewed, and she  reports that she has never smoked. She has never used smokeless tobacco. She reports that she does not drink alcohol and does not use drugs.     FAMILY HISTORY     Reviewed, and family history includes Coronary Artery Disease in her mother.     ALLERGIES     Allergies   Allergen Reactions     Estratest H.S. Other (See Comments) and Unknown     Other reaction(s): Other (see comments)  Increase lipids  Outside source did not list reaction  Increase lipids       Propofol Nausea and Vomiting     Outside source states \"injectable and inhaled\"  Outside source states \"injectable and inhaled\"  Outside source states \"injectable and inhaled\"  Outside source states \"injectable and inhaled\"       Reglan      Estrogens Rash     Other reaction(s): Other (see comments)  Outside source did not list reaction  Estrogen patches, from patch only, ( Adhesive )  does tolerate estrogen other forms        Penicillins Nausea and Vomiting and Nausea     Other reaction(s): GI intolerance, GI Upset  Patient states upset stomach.  Patient states upset stomach.  Patient states upset stomach.  Patient states upset stomach.           REVIEW OF SYSTEMS     A 12 point review of system " was performed and was negative except as outlined in the history of present illness.     HOME MEDICATIONS     Current Outpatient Rx   Medication Sig Dispense Refill     acetaminophen (TYLENOL) 325 MG tablet Take 3 tablets by mouth every 8 hours. 100 tablet 0     calcium carbonate-vitamin D (CALCIUM + D) 600-200 MG-UNIT TABS Take 1 tablet by mouth daily.       Celecoxib (CELEBREX PO) Take 200 mg by mouth daily.       Chlorpheniramine-Phenylephrine 4-10 MG TABS        Cholecalciferol (VITAMIN D3 PO) Take 1,000 Units by mouth daily.       denosumab (PROLIA) 60 MG/ML SOSY injection Inject 60 mg Subcutaneous once       DULoxetine (CYMBALTA) 60 MG capsule Take 60 mg by mouth daily        famotidine (PEPCID) 40 MG tablet TAKE 1 TABLET BY MOUTH TWICE A DAY       fluocin-hydroquinone-tretinoin (TRI-RODNEY) 0.01-4-0.05 % CREA Externally apply topically At Bedtime 30 g 3     fluticasone (FLONASE) 50 MCG/ACT nasal spray INSTILL 1 SPRAY INTO EACH NOSTRIL ONCE DAILY AS NEEDED FOR RHINITIS       glucosamine-chondroitin 500-400 MG CAPS Take 1 capsule by mouth daily.       losartan (COZAAR) 100 MG tablet Take 100 mg by mouth       methocarbamol (ROBAXIN) 500 MG tablet TAKE 1 TABLET BY MOUTH 3 TIMES A DAY       multivitamin, therapeutic with minerals (MULTI-VITAMIN) TABS Take 1 tablet by mouth daily.       naproxen sodium 220 MG capsule Take 220 mg by mouth 2 times daily (with meals)       Omega-3 Fatty Acids (OMEGA-3 FISH OIL PO) Take 1,200 g by mouth every 7 days        OMEPRAZOLE PO        pravastatin (PRAVACHOL) 40 MG tablet Take 40 mg by mouth       pregabalin (LYRICA) 100 MG capsule Take 100 mg by mouth 2 times daily        zolpidem (AMBIEN) 5 MG tablet Take 5 mg by mouth       medical cannabis (Patient's own supply) See Admin Instructions (The purpose of this order is to document that the patient reports taking medical cannabis.  This is not a prescription, and is not used to certify that the patient has a qualifying medical  "condition.)            PHYSICAL EXAM     Vital signs  BP (!) 170/94 (BP Location: Right arm, Patient Position: Sitting)   Pulse 73   Ht 1.626 m (5' 4\")   Wt 63 kg (139 lb)   BMI 23.86 kg/m      Weight:   139 lbs 0 oz    Patient is alert and oriented x4 in no acute distress. Vital signs were reviewed and are documented in electronic medical record. Neck was supple, no carotid bruits, thyromegaly, JVD, or lymphadenopathy was noted.   NEUROLOGY EXAM:    Patient s speech was normal with no aphasia or dysarthria.  She appears somewhat confused    Funduscopic exam was normal, with normal cup to disc ratio. Cranial nerves II -XII were intact.  Except she is hard of hearing    Patient had normal mass, tone with 5/5 strength in the upper extremity proximally in the lower extremity 4+/5 distally 5/5    Sensation was intact to light touch, pinprick, and vibratory sensation.     Reflexes were 0 symmetrical with downgoing toes.     No dysmetria noted on FNF or HKS.     She has a wide-based gait and has trouble tandem walking Romberg negative     DIAGNOSTIC STUDIES     PERTINENT RADIOLOGY  Following imaging studies were reviewed:     CT BRAIN 12/17/2020  1.  No acute intracranial process.  2.  Minimal chronic small vessel ischemic disease.     CT LUMBAR SPINE 9/23/2020  1. New but subacute to chronic appearing fractures of L2, including mild  superior endplate compression and incompletely healed bilateral pedicle  fractures, which on the left extend into the posterosuperior vertebral body.  2. Postoperative changes including anterior and posterior instrumented fusion at  L5-S1. No hardware failure. No advanced spinal canal or neural foraminal  narrowing at any level.     MRI BRAIN 9/13/2021  1. No evidence of acute intracranial hemorrhage, mass effect, or infarction.  2. Mild nonspecific white matter changes.  3. Mild brain parenchymal volume loss.    MRI CERVICAL SPINE 7/12/2021  1.  Mild central canal and left foraminal " narrowing at C6-7  2.  Mild degenerative changes within the remainder of the cervical spine as above    MRI, MRA BRAIN 2/26/2021  HEAD MRI:   1.  Mild chronic small vessel ischemic changes and age-related changes.  2.  Otherwise normal brain MRI.    HEAD MRA:   1.  Normal MRA [place].    NECK MRA:  1.  Minimal plaque in the right carotid bulb, but no significant associated stenosis by NASCET criteria.  2.   Otherwise normal neck MRA.     MRI LUMBAR SPINE 9/23/2020  No new spondylolisthesis since 05/04/2020. Interval subacute  appearing superior L2 compression fracture and bilateral pedicle fractures.  Stable chronic healing fracture through the posterolateral left L3 vertebral  body and pedicle.     EMG 7/13/2021  1.  This is a normal electrodiagnostic study of bilateral lower extremities  2.  Specifically there is no electrodiagnostic evidence of a lumbar radiculopathy, plexopathy or peripheral apathy  NEUROPSYCHOLOGICAL EVALUATION 3/25/2021  No Cognitive Disorder     Adjustment Disorder with Anxiety     RECOMMENDATIONS:  1) Stress management strategies are strongly encouraged, as improvement in emotional distress will lead to perceived improvements in her cognitive functioning (and even her physical pain level to some degree) over time. Such strategies may include:  Participating in individual psychotherapy. If the patient is amenable, I will refer her to our psychologist (Dr. Flavia Rivera). I will have our schedulers call her to set up an appointment, or she can always call our clinic at a later time to establish care ([phone]).  Consider an adjustment to her medications for her mood/anxiety. She experienced side effects with increased dosing of Cymbalta; as such, consider an alternative medication for anxiety (e.g., a selective serotonin reuptake inhibitor). This may help with her daytime anxiety as well as the heightened anxiety she experiences at night (she fears her sleep medications will make  her too sedated to respond to her 's needs, but her anxiety about this keeps her from sleeping and increases her hypervigilance at night as well). This recommendation will be deferred to her PCP.  Utilizing relaxation practices. I will mail her a handout with some techniques she may try on her own.  Attending caregiving support groups. She may find these through the Alzheimer's Association website or hotline (https://www.alz.org/ or by calling 856-483-7304 ) or through the [employer] (https://[place].gov/TeraFirrma-Green Genes/ or by calling 361-800-0538).     2) In addition, a referral to a  or  or calling the [employer] may be helpful to assist her in seeking out respite services or formal in-home care giving services to allow Ms. De La Rosa to take a break or re-engage in her hobbies and interests.     3) Ms. De La Rosa is also encouraged to follow up with her medical providers for ongoing pain management. Improvements in her pain may help her stress level and cognitive functioning to some degree.     4) Ms. De La Rosa should adhere closely to her medication regimen in order to manage her cerebrovascular risk factors (hyperlipidemia), pain, and mood. This may help to prevent future cognitive decline.     5) The patient is encouraged to remain physically, socially, and mentally active for optimal brain health. This may also help to improve her emotional wellbeing.     6) From a neurocognitive standpoint, I have no concerns about the patient's ability to independently perform IADLs or care for her .      7) Cognitive strategies may be helpful for her own reassurance. These may include utilizing note pads, checklists, to-do lists, alarm reminders, a detailed calendar/planner, a GPS, a pillbox, and maintaining a daily morning and nighttime routine and an organized living environment. She should avoid multi-tasking when possible and should attempt to reduce as much distraction as  possible while performing complex or important tasks.     8) Neuropsychological follow-up is not warranted at this time. However, the current test data can now serve as a baseline should a repeat assessment be indicated in the future     PERTINENT LABS  Following labs were reviewed:  Admission on 08/16/2021, Discharged on 08/16/2021   Component Date Value     Color Urine 08/16/2021 Colorless      Appearance Urine 08/16/2021 Clear      Glucose Urine 08/16/2021 Negative      Bilirubin Urine 08/16/2021 Negative      Ketones Urine 08/16/2021 Negative      Specific Gravity Urine 08/16/2021 1.008      Blood Urine 08/16/2021 Negative      pH Urine 08/16/2021 6.0      Protein Albumin Urine 08/16/2021 Negative      Urobilinogen Urine 08/16/2021 <2.0      Nitrite Urine 08/16/2021 Negative      Leukocyte Esterase Urine 08/16/2021 Negative      Mucus Urine 08/16/2021 Present*     RBC Urine 08/16/2021 <1      WBC Urine 08/16/2021 <1        Ref Range & Units 2 wk ago   VITAMIN B12 180 - 914 pg/mL 808      Ref Range & Units 2 wk ago    VITAMIN B1 WHL BLD 66.5 - 200.0 nmol/L 116.7       Ref Range & Units 2 wk ago   Methylmalonic Acid 0 - 378 nmol/L 318       Ref Range & Units 2 wk ago   HOMOCYSTEINE,TOTAL 5.0 - 15.4 umol/L 12.1      Ref Range & Units 2 wk ago   FOLIC ACID >=4.0 ng/mL >20.0        Total time spent for face to face visit, reviewing labs/imaging studies, counseling and coordination of care was: 30 Minutes spent on the date of the encounter doing chart review, review of outside records, review of test results, interpretation of tests, patient visit, documentation and discussion with family     This note was dictated using voice recognition software.  Any grammatical or context distortions are unintentional and inherent to the software.    Orders Placed This Encounter   Procedures     Neuropsychology Referral      New Prescriptions    No medications on file     Modified Medications    No medications on file

## 2021-10-04 ENCOUNTER — TELEPHONE (OUTPATIENT)
Dept: NEUROLOGY | Facility: CLINIC | Age: 76
End: 2021-10-04
Payer: MEDICARE

## 2021-10-06 NOTE — TELEPHONE ENCOUNTER
Please let the patient know that insurance might not cover neuropsychological testing within 1 year.  However as there has been further decline in her cognition with frequent falls I am inclined to get another neuropsychological testing.  If there are no appointments available at [facility] we can refer her to , phone number [phone] at a clinic in [place]

## 2021-10-06 NOTE — TELEPHONE ENCOUNTER
Lucie called again. She wanted to add that appts are far out and pt is willing to go anywhere sooner. I told Lucie we are looking into it. She is anxious, as pt has had recent falls totaling 6. And broke her nose during one of them. [phone]

## 2021-10-09 ENCOUNTER — HOSPITAL ENCOUNTER (EMERGENCY)
Facility: HOSPITAL | Age: 76
Discharge: LEFT BEFORE DISCHARGE | End: 2021-10-09
Payer: MEDICARE

## 2021-10-09 VITALS
DIASTOLIC BLOOD PRESSURE: 91 MMHG | HEIGHT: 64 IN | TEMPERATURE: 97.8 F | RESPIRATION RATE: 16 BRPM | HEART RATE: 87 BPM | OXYGEN SATURATION: 99 % | SYSTOLIC BLOOD PRESSURE: 204 MMHG | BODY MASS INDEX: 24.75 KG/M2 | WEIGHT: 145 LBS

## 2021-10-09 ASSESSMENT — MIFFLIN-ST. JEOR: SCORE: 1142.72

## 2021-10-12 NOTE — TELEPHONE ENCOUNTER
"Lena Mike, Psy.D, LP  You; Dee Dee Kang; [facility] Care Team; Juanita Haque 23 minutes ago (3:35 PM)     SO    Ok, I finally got a hold of someone who had some information for us.     Basically, Medicare does NOT do prior authorizations or make any guarantees for coverage prior to the service being rendered. That said, unlike Annual Wellness Visits, mammograms, etc. that are strictly limited to 1 per year, psych/neuropsych testing does not have that strict limitation. That said, if we do more than 1 neuropsych eval per year, then we need to provide additional documentation that the service was medically necessary (as the patient's daughter mentioned). However, we first have to render the service (i.e., see the patient on Monday), bill for it, and then Medicare \"pins\" it for further review after seeing the patient already had an eval earlier this year. They would then reach out to me and have me fill out additional documentation AFTER seeing the patient. After receiving my documentation, they will then review it and determine if it was medically necessary (which by their definition includes seeking diagnostic clarity, which is what our testing aims to do).     Again, there is nothing we can do BEFORE the visit to guarantee coverage. It's ultimately up to the patient to decide if she wants to be seen now or wait until next year (when we know it will be covered).     Dee Dee will reach back out to the patient's daughter to relay this info. Thanks again, Dee Dee!    Message text        "

## 2021-10-18 ENCOUNTER — OFFICE VISIT (OUTPATIENT)
Dept: NEUROLOGY | Facility: CLINIC | Age: 76
End: 2021-10-18
Payer: MEDICARE

## 2021-10-18 DIAGNOSIS — F43.22 ADJUSTMENT DISORDER WITH ANXIOUS MOOD: ICD-10-CM

## 2021-10-18 DIAGNOSIS — G31.84 MILD NEUROCOGNITIVE DISORDER: Primary | ICD-10-CM

## 2021-10-18 NOTE — LETTER
10/18/2021         RE: Leonardo De La Rosa  [address]  [facility] [address]        Dear Colleague,    Thank you for referring your patient, Leonardo De La Rosa, to the [facility]. Please see a copy of my visit note below.      NEUROPSYCHOLOGY EVALUATION  [facility]      NAME: Leonardo De La Rosa    YOB: 1945   AGE: 75 year old  EDU: 13  DATE OF EVALUATION: 10/18/2021    REASON FOR REFERRAL:  Ms. De La Rosa is a 75 year old, right-handed, [place] female with hyperlipidemia, chronic pain, osteoporosis, sensorineural hearing loss, and frequent falls. Due to her family's concerns about cognitive decline in the context of frequent falls, the patient was previously evaluated by me on 3/25/2021. Neuropsychological testing at that time was done via telephone, as in-person evaluations were not being offered yet due to the pandemic. As such, the testing was quite limited. Nevertheless, results of that evaluation revealed variable processing speed and impaired phonemic fluency (possibly secondary to variable processing speed). All other domains were fully intact. Significant stressors and chronic pain were suspected to be the primary etiologies of her deficits at the time. Neurologic work-up was otherwise negative as well. However, since that time, her family has continued to observe progressive cognitive decline and more frequent falls. This was particularly concerning as she is the sole caregiver of her , who is significantly disabled. She was subsequently referred for this neuropsychological re-evaluation by neurologist, Augustin Terry MD, in order to assist with differential diagnosis and care planning.     SUMMARY OF FINDINGS: (please refer to Extended Report below for full details and comprehensive clinical history)  Due to the ongoing COVID-19 pandemic, this assessment was conducted using PPE worn by the examiner and a face-mask for the patient.  The standard administration of these tests involves direct face-to-face methods. The full impact of applying non-standard administration methods with PPE is not fully appreciated at this time. As such, the diagnostic conclusions and recommendations for treatment provided in this report are being advanced with caution.    With these limitations in mind, results of testing indicate that Ms. De La Rosa is of estimated average premorbid intellectual functioning, and most of Ms. De La Rosa's performances are generally commensurate with that estimate. However, she exhibits borderline-to-mild impairment on tests of verbal memory, as well as some aspects of language processing (phonemic fluency and confrontation naming). In addition, her processing speed appears variable, with scores ranging from mildly impaired to average. She also exhibits mild impairment on a test of visuoconstruction using blocks; however, she could only use one hand to complete the task due to injury of her left hand, and all other tests of visuospatial/constructional abiltiies are within normal limits. Lastly, her learning of visual information is slightly below expectation; however, her delayed recall of that same information is within normal limits.    With regard to verbal learning/memory more specifically, the patient exhibits what appears to be a mild encoding deficit on a word list learning/memory task, as her delayed recall is mildly impaired and does not benefit significantly from prompts/cues on recognition testing. Her memory of two detailed stories is somewhat stronger but still slightly lower than expectation, as her delayed recall and recognition memory of the stories is low average.    All other cognitive domains are considered to be intact, including attention/concentration, executive functions (including cognitive inhibition, mental flexibility/set-shifting, abstract reasoning, and judgment/problem solving of hypothetical health and safety  scenarios), semantic fluency, and (as noted above) nonverbal memory and most visuospatial/constructional abilities.    Ms. De La Rosa was previously tested in March of this year. The testing at that time was quite limited as it was done via telehealth methods. As such, comparisons are also limited. That said, significant declines are observed on a test of verbal learning/memory encoding (the word list task), which fell from the solidly average range to the low average/mildly impaired range. No other memory measures were administered in March due to the nature of the telephone visit. Some aspects of attention/concentration also declined, but continue to be within normal limits. Verbal fluency remained stable for both phonemic and semantic trials.    Emotionally, the patient endorses moderate anxiety and minimal depressive symptoms on self-report questionnaires. This has remained stable when compared with her responses in March.    IMPRESSIONS:    The current deficits are of unclear etiology, but likely due to a combination of factors.     I continue to suspect that the high level of stress she is under (which has only increased since her previous evaluation with me in March) is still a significant contributing factor.    In addition, she is not getting good, restorative sleep due to her care giving burdens. Chronic sleep disturbances can contribute to greater cognitive deficits over time.    The patient is also taking some medications that may lead to cognitive side effects and increased risk of falls due to their anticholinergic and other properties (e.g., methocarbamol, chlorapheniramine-phenylephrine, and zolpidem).     Cultural factors may have influenced her performances on some tests (particularly the confrontation naming task).    Lastly, a neurodegenerative syndrome cannot be fully ruled out. Some aspects of her profile are consistent with what can be seen in individuals with Alzheimer's disease (AD); however,  other aspects of her profile are incongruent. In addition, the rate of progression over the past 7 months is quicker than what is typically seen in AD. There are no compelling features of a synucleinopathy, or vascular or frontotemporal dementia processes.    DIAGNOSIS:  Unspecified Mild Neurocognitive Disorder    Adjustment Disorder with Anxiety     RECOMMENDATIONS:  1) Ongoing neurologic care and monitoring is recommended.     2) If not medically contraindicated, Ms. De La Rosa may benefit from an empirical trial of a cognitive-enhancing medication.     3) Consider a PET scan to help clarify etiology of Ms. De La Rosa's cognitive decline.    4) Consider a referral to Medication Therapy Management (pharmacist) for a review of Ms. De La Rosa's current regimen and to provide recommendations in order to minimize any unwanted side effects.    5) Additional caregiving support for Ms. De La Rosa is crucial. There are several cultural factors at play, but I would highly encourage Ms. De La Rosa to consider formal in-home care giving services to assist her so that she can have some respite. Some other ideas for increased support include hiring a caregiver to come at night so that she can sleep, hiring a cook who is familiar at preparing  cuisine, or even having regularly scheduled times of respite when her daughters can attend to their dad so she can engage in her own self-care activities (e.g., every Saturday and Sunday from 12-5 PM).    6) Although the patient has declined this in the past, I would encourage her to consider establishing care with a psychotherapist for additional support. If she prefers a provider who is more familiar with her culture, there are a couple of options that I found in the community:    Jenny Mak of [facility] in [place] ([phone])    Fran Gutierrez of [facility] in [facility] ([phone])    7) Given her mild memory issues, occasional oversight is  encouraged with more complex and/or novel tasks to ensure accuracy and safety (e.g., medication and financial management). She should allow herself extra time to complete these types of tasks as well.    8) The patient is encouraged to utilize cognitive compensatory strategies in daily life, including utilizing note pads, checklists, to-do lists, a calendar/planner, labeled alarm reminders, a GPS, a pillbox, and maintaining a daily morning and nighttime routine and an organized living/work environment.    9) Ms. De La Rosa is strongly encouraged to adhere closely to her medication regimen to help keep her cerebrovascular risk factors (e.g., hyperlipidemia) and other medical conditions well controlled. This may help prevent further exacerbations in cognitive decline in the future.    10) Neuropsychological follow-up is recommended in 12-18 months (or as clinically indicated) in order to monitor her cognitive status, help to clarify etiology, and update recommendations. The current test data can be used as a baseline to which future comparisons can be made.      FEEDBACK OF ASSESSMENT RESULTS:  Ms. De La Rosa has requested to receive the results of this evaluation via a formal feedback appointment with me, which will be scheduled at the patient's convenience, typically within two weeks of today's date.     Thank you for allowing me to participate in Ms. De La Rosa's care. Please contact me with any questions regarding the content of this report.        Lena Mike PsyD, LP  Licensed Clinical Neuropsychologist  [facility] [address], [address]  Phone: [phone]        --------------------------------EXTENDED REPORT--------------------------------  The following information was obtained via medical record review and by interview of the patient and her daughter, Lucie.    HISTORY OF PRESENTING PROBLEM:  Ms. De La Rosa initially met with neurologist, Augustin Terry MD, on 3/4/2021 due to  "concerns about progressive cognitive decline and frequent falls. At that time, the patient was administered a brief cognitive screen (MOCA), on which she earned a score of 23/30 (suggesting the presence of cognitive impairment). Her neurologic examination was otherwise normal. Dr. Terry suspected that her cognitive concerns may be secondary to psychosocial stressors, but referred her for a comprehensive work-up (including a brain MRI/MRA, blood work, and this neuropsychological evaluation) in order to rule out other causes.     The patient's brain MRI dated 2/26/2021 revealed mild chronic small vessel ischemic changes and moderate generalized volume loss. There was no evidence of normal pressure hydrocephalus or acute intracranial findings. Brain MRA was unremarkable. All blood work from 3/8/2021 was normal (including folic acid, homocysteine, MMA, TSH, and vitamins B12, B1, and E).    Ms. De La Rosa saw me for her initial neuropsychological assessment on 3/25/2021 via telephone visit. At that time, she reported having difficulty with concentration and her mind \"going blank\" for the past 6 months.     As documented in my initial report, Ms. De La Rosa is the sole caregiver for her , who suffers from dementia and physical limitations following a major stroke he experienced in 2017. He subsequently relies on Ms. De La Rosa for all of his cares and is described as quite demanding and controlling (a longstanding trait that amplified after his stroke). There were several other stressful family dynamics going on. At that time, Ms. De La Rosa noted, \"I'm unhappy. And when I'm unhappy, I'm not thinking. I go blank.\"     Despite her frustrations with her cognition, she reported that she was fully independent in all ADLs and IADLs at that time. Results of her neuropsychological testing at the time (which was quite limited as it was performed via telephone) revealed variable processing speed and impaired phonemic fluency (possibly " secondary to variable processing speed). All other domains were fully intact. Significant stressors and chronic pain were suspected to be the primary etiologies of her deficits at the time. Please refer to my report from that date for further detail and recommendations offered.    INTERIM HISTORY.  Since that evaluation in March 2021, the patient has continued to experience cognitive decline and frequent falls resulting in various injuries. She sought a second opinion from neurologist at [facility] (Darshana Sheikh MD) on 7/9/2021. At that time, the patient reported having had about 10 falls since 2017, with 3-4 occurring within the past month. The patient also reported forgetfulness and tremor in her hands. Her neurologic exam that day was largely unremarkable, with no evidence of tremor, dysmetria, or abnormal gait. The neurologist suspected her falls may be secondary to hip flexor weakness related to her spinal canal stenosis. She was referred for physical therapy, an updated MRI of the cervical spine, and an EMG to rule out peripheral neuropathy vs. myopathy. The neurologist also recommended use of a cane, but the patient was uninterested in that. EMG and MRI did not show any high-grade stenosis.    The patient started PT on 8/9/2021, and their evaluation revealed a significant fear of falling, bilateral lower extremity weakness, core instability, abnormal gait pattern, and high fall risk based on TUG and Bustos balance assessment. She has participated in a few additional treatment sessions, most recently on 9/22/2021.    Ms. De La Rosa followed up with Dr. Terry on 8/27/2021. Her daughter, Lucie, accompanied her to that appointment and expressed great concern about her mother's declining cognition and stress. Dr. eTrry felt compelled to evaluate these concerns further and subsequently ordered a repeat brain MRI, EEG, updated blood work and an updated in-person neuropsychological evaluation.    Updated MRI of the  brain was completed on 9/13/2021 revealed only mild nonspecific white matter changes and mild brain parenchymal volume loss. Blood work dated 9/2/2021 was all normal (including folic acid, homocysteine, MMA, vitamin B1, vitamin B12, etc.). EEG dated 9/20/2021 was also read as normal.    CURRENT CLINICAL INTERVIEW:  Today, Ms. De La Rosa reported experiencing ongoing yet stable memory and concentration issues, which she largely attributes to ongoing stress. Her care giving responsibilities have increased since our last evaluation in March, as her 's medical status has further declined. There continue to be stressful family and cultural dynamics going on as well.    In contrast to the patient's reports, Lucie reported that her mother's memory issues are progressively worsening over time. Lucie started to become concerned early this year. Specifically, she has noticed that she is unable to focus, has difficulty staying on topic in conversation, sends texts and emails to the wrong people, and forgets conversations. She also feels that her mother's spatial perception is off, although that may be in part due to vision issues. Lucie feels that her mother is not willing to accept that she is having notable difficulty at home.     With regard to the activities of daily living, Ms. De La Rosa reported that she is mostly independent. She continues to live in a single family home with her . Lucie moved out of their home 2 months ago due to strained dynamics in the family. Lucie was helping her parents quite a bit when she was not at work, and she hoped that her moving out would force her mother to see that she needs more formal care giving assistance. While the patient is open to more formal help, her  is resistant to it and the patient submits to this for cultural reasons. They have also decided as a family to not place him in assisted living due to cultural reasons. The patient no longer drives because her   became overly critical of her driving. She now relies on Uber and Lyft services, but Lucie noted that her mother will occasionally tip the wrong amount or enter the incorrect address. The patient manages all of her medications, along with her 's medications. She clearly prioritizes her husbands needs over her own. She makes sure he gets all of his medications right on time, but she often forgets about her own medications and subsequently takes them late. Lucie has taken over some of the bill paying and financial management in order to help ease some of her mother's stress. She has access to all of their bank accounts but the patient continues to make all of the financial decisions. With regard to cooking, the patient stated that she gets frazzled when cooking because her  is quite particular and hovers over her while she makes meals. She also has been burning things more often and leaving out key ingredients at times. She receives assistance from a .    MEDICAL HISTORY:  As noted previously, Ms. De La Rosa has been experiencing more frequent falls over time. She recently fractured her nose, and has hist her head during some of the falls. While she denied any LOC or concussive symptoms, she acknowledged that recently she has been experiencing a headache in the right temporal area. She also continues to experience chronic pain in different areas of her body. Her typical level of pain is a 3 out of 10. Ms. De La Rosa's medical history is otherwise positive for hyperlipidemia, arthritis, sensorineural hearing loss, osteoporosis, and GERD. The patient denied any history of significant head injuries, known seizure, stroke, heart attack, tremor, balance issues and microsmia/anosmia. To her knowledge, she has never had COVID-19 and has now received both dosages of the vaccine.    The patient reported that her sleep is frequently disturbed and she experiences early morning awakening. She noted that  "she is on \"high alert\" with her , checking on him every time he moves in his sleep or makes noise of any kind. She also continues to wake up at 3 AM and is usually unable to fall back asleep. She stated that she takes 1 tablet of Sleep 3 (from [employer]) per night for the past few months. She only takes Ambien on occasion and noted that she never takes Ambien and Sleep 3 together. Known symptoms of REM sleep behavior disorder were denied.    Past Surgical History:   Procedure Laterality Date     ARTHRODESIS FOOT  4/22/2013    Procedure: ARTHRODESIS FOOT;  Left Second and Third Tarsal metatarsal Arthrodesis, Bunion Correction, Lapidus Procedure, Weil Osteotomy   ;  Surgeon: Alber Pace MD;  Location: [facility] OR     BUNIONECTOMY LAPIDUS WITH TARSAL METATARSAL (TMT) FUSION  4/22/2013    Procedure: BUNIONECTOMY LAPIDUS WITH TARSAL METATARSAL (TMT) FUSION;;  Surgeon: Alber Pace MD;  Location: [facility] OR     C TOTAL KNEE ARTHROPLASTY      left     FOOT SURGERY      right bunionectomy.     FOOT SURGERY       HYSTERECTOMY       HYSTERECTOMY TOTAL ABDOMINAL, BILATERAL SALPINGO-OOPHORECTOMY, COMBINED       KNEE SURGERY       Diagnostic studies: Updated MRI of the brain was completed on 9/13/2021 revealed only mild nonspecific white matter changes and mild brain parenchymal volume loss. Blood work dated 9/2/2021 was all normal (including folic acid, homocysteine, MMA, vitamin B1, vitamin B12, etc.). EEG dated 9/20/2021 was also read as normal.    Current medications include (per medical record):   Current Outpatient Medications:      acetaminophen (TYLENOL) 325 MG tablet, Take 3 tablets by mouth every 8 hours., Disp: 100 tablet, Rfl: 0     calcium carbonate-vitamin D (CALCIUM + D) 600-200 MG-UNIT TABS, Take 1 tablet by mouth daily., Disp: , Rfl:      Celecoxib (CELEBREX PO), Take 200 mg by mouth daily., Disp: , Rfl:      Chlorpheniramine-Phenylephrine 4-10 MG TABS, , Disp: , Rfl:      Cholecalciferol " (VITAMIN D3 PO), Take 1,000 Units by mouth daily., Disp: , Rfl:      denosumab (PROLIA) 60 MG/ML SOSY injection, Inject 60 mg Subcutaneous once, Disp: , Rfl:      DULoxetine (CYMBALTA) 60 MG capsule, Take 60 mg by mouth daily , Disp: , Rfl:      famotidine (PEPCID) 40 MG tablet, TAKE 1 TABLET BY MOUTH TWICE A DAY, Disp: , Rfl:      fluocin-hydroquinone-tretinoin (TRI-RODNEY) 0.01-4-0.05 % CREA, Externally apply topically At Bedtime, Disp: 30 g, Rfl: 3     fluticasone (FLONASE) 50 MCG/ACT nasal spray, INSTILL 1 SPRAY INTO EACH NOSTRIL ONCE DAILY AS NEEDED FOR RHINITIS, Disp: , Rfl:      glucosamine-chondroitin 500-400 MG CAPS, Take 1 capsule by mouth daily., Disp: , Rfl:      losartan (COZAAR) 100 MG tablet, Take 100 mg by mouth, Disp: , Rfl:      medical cannabis (Patient's own supply), See Admin Instructions (The purpose of this order is to document that the patient reports taking medical cannabis.  This is not a prescription, and is not used to certify that the patient has a qualifying medical condition.) , Disp: , Rfl:      methocarbamol (ROBAXIN) 500 MG tablet, TAKE 1 TABLET BY MOUTH 3 TIMES A DAY, Disp: , Rfl:      multivitamin, therapeutic with minerals (MULTI-VITAMIN) TABS, Take 1 tablet by mouth daily., Disp: , Rfl:      naproxen sodium 220 MG capsule, Take 220 mg by mouth 2 times daily (with meals), Disp: , Rfl:      Omega-3 Fatty Acids (OMEGA-3 FISH OIL PO), Take 1,200 g by mouth every 7 days , Disp: , Rfl:      OMEPRAZOLE PO, , Disp: , Rfl:      pravastatin (PRAVACHOL) 40 MG tablet, Take 40 mg by mouth, Disp: , Rfl:      pregabalin (LYRICA) 100 MG capsule, Take 100 mg by mouth 2 times daily , Disp: , Rfl:      zolpidem (AMBIEN) 5 MG tablet, Take 5 mg by mouth, Disp: , Rfl:     RELEVANT FAMILY MEDICAL HISTORY:   Family neurologic history is unremarkable, with no known family history of dementia. Other family medical history is positive for the following:  Family History   Problem Relation Age of Onset      "Coronary Artery Disease Mother      PSYCHIATRIC HISTORY:  With regard to her psychiatric history, Ms. De La Rosa endorsed a history of anxiety and depressed mood ever since her 's stroke in 2017 when her stress level significantly increased. Currently, the patient described her mood as \"more depressed and anxious at times, especially when my  is being unreasonable.\" She also endorsed stressors related to a longstanding conflict between her daughters (Lucie and their oldest daughter). Their other daughter plans to move in with them next week, and the patient worries how that will affect her relationship with Lucie. Lucie worries that her sister will add more responsibilities for their mother. The patient reported that she has a strong support system with her children and other good friends. She continues to reserve some time to herself in the morning before her  wakes up during which she prays, meditates, reads, and watches TV. Once her  awakens, she spends her entire day attending to him. Current or historical suicidal ideation was denied.    With regard to substance abuse, Ms. De La Rosa denied history of past drug or alcohol abuse or dependence. She has never been a smoker. Currently, she reported that she typically consumes 1-2 glasses of wine per night, which is something she started doing over the past year in order to help cope with stress. She no longer uses medicinal marijuana for pain management. Other current substance use was denied.    SOCIAL HISTORY:  Per my previous report, Ms. De La Rosa was born and raised in [facility] [place]. She grew up speaking English, Melina, and Emigdio throughout her childhood. She attended English-speaking schools and spoke English in the home. She graduated high school and completed one year of college. She left college because she could no longer afford it. She earned mostly above average grades. Significant learning difficulties or developmental attention " issues were denied. She was  via an arranged marriage in 1965, then lived in [place] from 1965 to 1981. She and her  moved to [place] in 1981 and have lived here ever since. 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 She stopped working outside of the home in 2017 after her 's stroke. She has been  for 56 years, and they have 3 children together. The patient and her  live together in their own home in [place], [place].    TESTS ADMINISTERED:   Wechsler Memory Scale-III (WMS-III) select subtests, Wechsler Adult Intelligence Scale-IV (WAIS-IV) select subtests, Wide Range Achievement Test-4 (WRAT-4) select subtests, Wechsler Memory Scale-IV (WMS-IV) select subtests, Shearer Verbal Learning Test-R (HVLT-R), Trailmaking Test (Trails A & B), Controlled Oral Word Association Test (COWAT) and Category Fluency, [place] Naming Test-2 (BNT-2), Clock Drawing Test, Stroop Color and Word Test,  Generalized Anxiety Disorder-7 Assessment (SIS-7), Geriatric Depression Scale-Short Form (GDS-15), and Independent Living Scales (ILS) - Health & Safety subtest.    MOANS norms were used for BNT, Trail Making Test A & B, COWAT & Category Fluency    DESCRIPTIVE PERFORMANCE KEY:    Labels for tests with Normal Distributions  Score Label Standard Score %ile Rank   Exceptionally high score  > 130 > 98   Above average score 120-129 91-97   High average score 110-119 75-90   Average score  25-74   Low average score 80-89 9-24   Below average score 70-79 2-8   Exceptionally low score < 70 < 2     Labels for tests with Non-Normal Distributions  Score Label %ile Rank   Within normal expectations/ limits score (WNL) > 24   Low average score 9-24   Below average score 2-8   Exceptionally low score < 2     The following test results utilize score labels as adapted from Estiven Shoemaker, Mynor Sawyer, Gianna Hunter, MARKELL Lincoln, Cathy CORONA  David Kuo Michael Westerveld & Conference Participatnts (2020): American Academy of Clinical Neuropsychology consensus conference statement on uniform labeling of performance test scores, The Clinical Neuropsychologist, DOI: 10.1080/[email or web address]. All scores contain some measure of error; scores are reported here as they are obtained by the individual (without reference to the range of error). These are meant as labels and not interpretation of performance. While other relevant comments regarding task performance are provided below, please see the Summary, Impressions, and Diagnosis sections of this report for interpretation of the scores and the cognitive profile as a whole, including what does and does not constitute impairment for this particular individual.    COVID-19-ERA NEUROPSYCHOLOGY LIMITATIONS:  Due to the ongoing COVID-19 pandemic, this assessment was conducted with the examiner wearing [place]-designated PPE and the patient wearing a face mask. The standard administration of these procedures involves direct face-to-face methods. The impact of applying non-standard administration methods has been evaluated only in part by scientific research. While every effort was made to simulate standard assessment practices, the diagnostic conclusions and recommendations for treatment provided in this report are being advanced with these limitations in mind.    BEHAVIORAL OBSERVATIONS:   Ms. De La Rosa arrived late and accompanied by her daughter, Lucie, to today's appointment. She was appropriately dressed and groomed. Gait and other motor activity appeared normal. She appeared alert and engaged. No vision or hearing difficulties were observed. Conversational speech was of normal rate, volume, and prosody. No significant word-finding pauses or paraphasias were noted. Her thought process appeared linear and goal-directed, although she tended to perseverate on her stressors. No  hallucinations or delusions were apparent. Judgment and insight appeared largely intact. Her mood was euthymic and her affect was appropriately reactive. Rapport was easily established and eye contact was appropriate.     During the testing session, Ms. De La Rosa was alert throughout. She was pleasant and cooperative throughout the evaluation. She frequently yawned but never mentioned that she was feeling tired and only took one brief break. She understood test instructions without difficulty. No frontal signs were observed behaviorally. Ms. De La Rosa appeared adequately motivated and engaged easily during testing. Her score on an embedded measure of performance validity was in the valid range. Overall, the following results are considered a reasonably valid estimation of her current cognitive abilities.    OPTIMAL PREMORBID INTELLECT:  Optimal premorbid intellectual abilities were estimated as falling in the average range based on Ms. De La Rosa's educational and occupational histories and performance on tasks least likely to be affected by acquired brain dysfunction.    SUMMARY OF TEST RESULTS:  ORIENTATION. Performance on a mental status exam, assessing orientation to personal and current information, resulted in a within normal limits score. She was oriented to person, place, time, and most of the date (although she stated that the day of the month was the 22nd instead of the 19th), and was able to correctly name the current and previous presidents.    ATTENTION/WORKING MEMORY. The patient's score on a measure sensitive to sustained auditory-verbal attention and concentration (WAIS-IV Digit Span) was classified as average, as she was able to recite up to 5 digits forward (an average score), up to 5 digits backward (an average score), and up to 4 digits in sequence (a low average score).      PROCESSING SPEED. On the WAIS-IV Processing Speed Index, the patient's score was low average (PSI = 84), with low average speeded  digit-symbol coding (Coding), and average speeded visual scanning/cancellation (Symbol Search). On a test of complex concentration that requires speeded numeric sequencing (Trails A), the patient's score was below average for completion time and without error. On the Stroop test, speeded color naming was low average, and speeded word reading was also low average.     LANGUAGE PROCESSING. Language comprehension appeared intact. The WAIS-IV Verbal Comprehension Index was below average (pro-rated VCI = 78), with a low average score on a measure of verbal abstract reasoning (Similarities), and a below average score on a fund of information task (Information). Confrontation naming was measured as a below average score (36/60; BNT-2). She benefited minimally from phonemic cues on that task (+11/24). The patient's performance on a test of phonemic fluency resulted in an exceptionally low score (COWAT), while her semantic fluency was average (Category Fluency). Her writing sample was intact and there was no evidence of micrographia.     VISUOSPATIAL/CONSTRUCTIONAL SKILLS. The WAIS-IV Perceptual Reasoning Index was measured as a below average score (pro-rated AURA = 77), with average nonverbal abstract reasoning (Matrix Reasoning), and below average visuoconstruction with three-dimensional blocks (Block Design). On a Clock Drawing Task, the patient was able to draw a large, well-formed [place] with equally spaced and correctly placed numbers. Her clock hands converged nicely in the center of the clock face and were differentiated by length.      LEARNING/MEMORY. On the WMS-IV Logical Memory subtest, immediate memory for two paragraph-length stories resulted in a average score. After a 20-minute delay, the patient's score on the delayed recall trial was low average with a 38% retention rate. On the recognition trial, the patient's score was classified as low average.    On a 12-item verbal list-learning task (HVLT-R), the  patient acquired up to 6 words (50%) of the word list by the 3rd and final learning trial (raw scores over trials = 6, 5, 6). Total learning acquisition was classified as a low average score. After a 20-minute delay, the patient recalled 2 items, reflecting an exceptionally low score with a 33% retention rate (also an exceptionally low score). Her recognition discriminability score was exceptionally low, as she correctly recognized 9/12 items and made 2 false-positive errors.     On a visual memory measure (WMS-IV Visual Reproduction), immediate recall of simple geometric figures was low average, and delayed recall of the figures was also low average (with a 48% retention rate). Delayed recognition of the figures was exceptionally low.     EXECUTIVE FUNCTIONS. On a test of inhibition and cognitive flexibility, (Stroop), the patient's score was low average for completion time and made 2 errors. On a test that requires speeded alpha-numeric sequencing/cognitive set-shifting (Trails B), performance was low average and with 1 error. Verbal and nonverbal abstract reasoning were low average and average, respectively (WAIS-IV Similarities and Matrix Reasoning). Phonemic fluency was exceptionally low (COWAT). Performance on the Clock Drawing Test was impaired, as she was unable to accurately represent analog time.     INDEPENDENT LIVING SKILLS. The patient was administered the Health & Safety subtest of the Independent Living Skills measure, which assesses the patient's judgment and problem-solving abilities as it pertains to various hypothetical health and safety dilemmas. Compared to a healthy, community-dwelling geriatric population, the patient's score was average.     MOOD. On the GDS-15 a self report measure of depressive symptomatology, she obtained a score of 2, placing her in the range of normal depression. She denied suicidal ideation. On the SIS-7, a self-report measure of anxiety, she obtained a score of 10,   placing her in the range of moderate anxiety.    ____________________________________________________________________________________    SERVICES PROVIDED & TIME:  On-site Office Visit Details   A clinical interview/neurobehavioral status examination was conducted with the patient and documented. I thoroughly reviewed the medical record, selected the neuropsychological test battery, provided supervision to the trained examiner/technician, scored all of the neuropsychological tests (2+ tests), interpreted/integrated patient data and test results, and engaged in clinical decision making, treatment planning, report writing/preparation and provision of interactive feedback of test results on 10/25/2021. A trained examiner/technician administered the neuropsychological tests (2+ tests).  Please see below for a breakdown of time spent and the associated codes billed for these services.  Services   Time Spent  CPT Codes   Neurobehavioral Status Exam:  (e.g., clinical interview and neurobehavioral status exam, interpretation, report)   124 minutes   1 x 96116  1 x 96121   Neuropsychological Evaluation Services:   (e.g., integration, interpretation, treatment planning, clinical decision making, feedback via telehealth)   240 minutes   1 x 96132  3 x 96133   Neuropsychological Testing by Psychologist:  (e.g., test administration, scoring, 2+ tests administered)   58 minutes   1 x 96136  1 x 96137     Neuropsychological Testing by Trained Examiner/Technician:  (e.g., test administration, scoring, 2+ tests administered)   130 minutes   1 x 96138  3 x 96139   For diagnostic and coding purposes, Ms. De La Rosa has a history of hyperlipidemia, chronic pain, osteoporosis, sensorineural hearing loss, and frequent falls. 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 Please note, all charges are filed at the completion of the Episode of Care and associated with the final encounter date (feedback session on  10/25/2021).       The patient was seen for a neuropsychological evaluation for the purposes of diagnostic clarification and treatment planning. 130 minutes of face-to-face testing were provided by this writer.The patient was cooperative with testing. No concerns were brought to my attention. Please see Dr. Mike's report for a detailed description of the charges and interpretation and integration of the findings.      Again, thank you for allowing me to participate in the care of your patient.        Sincerely,        Sharda Kelley.LIZZ, LP

## 2021-10-18 NOTE — PROGRESS NOTES
NEUROPSYCHOLOGY EVALUATION  [facility]      NAME: Leonardo De La Rosa    YOB: 1945   AGE: 75 year old  EDU: 13  DATE OF EVALUATION: 10/18/2021    REASON FOR REFERRAL:  Ms. De La Rosa is a 75 year old, right-handed, [place] female with hyperlipidemia, chronic pain, osteoporosis, sensorineural hearing loss, and frequent falls. Due to her family's concerns about cognitive decline in the context of frequent falls, the patient was previously evaluated by me on 3/25/2021. Neuropsychological testing at that time was done via telephone, as in-person evaluations were not being offered yet due to the pandemic. As such, the testing was quite limited. Nevertheless, results of that evaluation revealed variable processing speed and impaired phonemic fluency (possibly secondary to variable processing speed). All other domains were fully intact. Significant stressors and chronic pain were suspected to be the primary etiologies of her deficits at the time. Neurologic work-up was otherwise negative as well. However, since that time, her family has continued to observe progressive cognitive decline and more frequent falls. This was particularly concerning as she is the sole caregiver of her , who is significantly disabled. She was subsequently referred for this neuropsychological re-evaluation by neurologist, Augustin Terry MD, in order to assist with differential diagnosis and care planning.     SUMMARY OF FINDINGS: (please refer to Extended Report below for full details and comprehensive clinical history)  Due to the ongoing COVID-19 pandemic, this assessment was conducted using PPE worn by the examiner and a face-mask for the patient. The standard administration of these tests involves direct face-to-face methods. The full impact of applying non-standard administration methods with PPE is not fully appreciated at this time. As such, the diagnostic conclusions and recommendations for  treatment provided in this report are being advanced with caution.    With these limitations in mind, results of testing indicate that Ms. De La Rosa is of estimated average premorbid intellectual functioning, and most of Ms. De La Rosa's performances are generally commensurate with that estimate. However, she exhibits borderline-to-mild impairment on tests of verbal memory, as well as some aspects of language processing (phonemic fluency and confrontation naming). In addition, her processing speed appears variable, with scores ranging from mildly impaired to average. She also exhibits mild impairment on a test of visuoconstruction using blocks; however, she could only use one hand to complete the task due to injury of her left hand, and all other tests of visuospatial/constructional abiltiies are within normal limits. Lastly, her learning of visual information is slightly below expectation; however, her delayed recall of that same information is within normal limits.    With regard to verbal learning/memory more specifically, the patient exhibits what appears to be a mild encoding deficit on a word list learning/memory task, as her delayed recall is mildly impaired and does not benefit significantly from prompts/cues on recognition testing. Her memory of two detailed stories is somewhat stronger but still slightly lower than expectation, as her delayed recall and recognition memory of the stories is low average.    All other cognitive domains are considered to be intact, including attention/concentration, executive functions (including cognitive inhibition, mental flexibility/set-shifting, abstract reasoning, and judgment/problem solving of hypothetical health and safety scenarios), semantic fluency, and (as noted above) nonverbal memory and most visuospatial/constructional abilities.    Ms. De La Rosa was previously tested in March of this year. The testing at that time was quite limited as it was done via telehealth methods. As  such, comparisons are also limited. That said, significant declines are observed on a test of verbal learning/memory encoding (the word list task), which fell from the solidly average range to the low average/mildly impaired range. No other memory measures were administered in March due to the nature of the telephone visit. Some aspects of attention/concentration also declined, but continue to be within normal limits. Verbal fluency remained stable for both phonemic and semantic trials.    Emotionally, the patient endorses moderate anxiety and minimal depressive symptoms on self-report questionnaires. This has remained stable when compared with her responses in March.    IMPRESSIONS:    The current deficits are of unclear etiology, but likely due to a combination of factors.     I continue to suspect that the high level of stress she is under (which has only increased since her previous evaluation with me in March) is still a significant contributing factor.    In addition, she is not getting good, restorative sleep due to her care giving burdens. Chronic sleep disturbances can contribute to greater cognitive deficits over time.    The patient is also taking some medications that may lead to cognitive side effects and increased risk of falls due to their anticholinergic and other properties (e.g., methocarbamol, chlorapheniramine-phenylephrine, and zolpidem).     Cultural factors may have influenced her performances on some tests (particularly the confrontation naming task).    Lastly, a neurodegenerative syndrome cannot be fully ruled out. Some aspects of her profile are consistent with what can be seen in individuals with Alzheimer's disease (AD); however, other aspects of her profile are incongruent. In addition, the rate of progression over the past 7 months is quicker than what is typically seen in AD. There are no compelling features of a synucleinopathy, or vascular or frontotemporal dementia  processes.    DIAGNOSIS:  Unspecified Mild Neurocognitive Disorder    Adjustment Disorder with Anxiety     RECOMMENDATIONS:  1) Ongoing neurologic care and monitoring is recommended.     2) If not medically contraindicated, Ms. De La Rosa may benefit from an empirical trial of a cognitive-enhancing medication.     3) Consider a PET scan to help clarify etiology of Ms. De La Rosa's cognitive decline.    4) Consider a referral to Medication Therapy Management (pharmacist) for a review of Ms. De La Rosa's current regimen and to provide recommendations in order to minimize any unwanted side effects.    5) Additional caregiving support for Ms. De La Rosa is crucial. There are several cultural factors at play, but I would highly encourage Ms. De La Rosa to consider formal in-home care giving services to assist her so that she can have some respite. Some other ideas for increased support include hiring a caregiver to come at night so that she can sleep, hiring a cook who is familiar at preparing [place] cuisine, or even having regularly scheduled times of respite when her daughters can attend to their dad so she can engage in her own self-care activities (e.g., every Saturday and Sunday from 12-5 PM).    6) Although the patient has declined this in the past, I would encourage her to consider establishing care with a psychotherapist for additional support. If she prefers a provider who is more familiar with her culture, there are a couple of options that I found in the community:    Jenny Mak of [facility] in [place] ([phone])    Fran Gutierrez of [facility] in [facility] ([phone])    7) Given her mild memory issues, occasional oversight is encouraged with more complex and/or novel tasks to ensure accuracy and safety (e.g., medication and financial management). She should allow herself extra time to complete these types of tasks as well.    8) The patient is encouraged to utilize  cognitive compensatory strategies in daily life, including utilizing note pads, checklists, to-do lists, a calendar/planner, labeled alarm reminders, a GPS, a pillbox, and maintaining a daily morning and nighttime routine and an organized living/work environment.    9) Ms. De La Rosa is strongly encouraged to adhere closely to her medication regimen to help keep her cerebrovascular risk factors (e.g., hyperlipidemia) and other medical conditions well controlled. This may help prevent further exacerbations in cognitive decline in the future.    10) Neuropsychological follow-up is recommended in 12-18 months (or as clinically indicated) in order to monitor her cognitive status, help to clarify etiology, and update recommendations. The current test data can be used as a baseline to which future comparisons can be made.      FEEDBACK OF ASSESSMENT RESULTS:  Ms. De La Rosa has requested to receive the results of this evaluation via a formal feedback appointment with me, which will be scheduled at the patient's convenience, typically within two weeks of today's date.     Thank you for allowing me to participate in Ms. De La Rosa's care. Please contact me with any questions regarding the content of this report.        Lena Mike PsyD,   Licensed Clinical Neuropsychologist  [facility] [address], [address]  Phone: [phone]        --------------------------------EXTENDED REPORT--------------------------------  The following information was obtained via medical record review and by interview of the patient and her daughter, Lucie.    HISTORY OF PRESENTING PROBLEM:  Ms. De La Rosa initially met with neurologist, Augustin Terry MD, on 3/4/2021 due to concerns about progressive cognitive decline and frequent falls. At that time, the patient was administered a brief cognitive screen (MOCA), on which she earned a score of 23/30 (suggesting the presence of cognitive impairment). Her neurologic  "examination was otherwise normal. Dr. Terry suspected that her cognitive concerns may be secondary to psychosocial stressors, but referred her for a comprehensive work-up (including a brain MRI/MRA, blood work, and this neuropsychological evaluation) in order to rule out other causes.     The patient's brain MRI dated 2/26/2021 revealed mild chronic small vessel ischemic changes and moderate generalized volume loss. There was no evidence of normal pressure hydrocephalus or acute intracranial findings. Brain MRA was unremarkable. All blood work from 3/8/2021 was normal (including folic acid, homocysteine, MMA, TSH, and vitamins B12, B1, and E).    Ms. De La Rosa saw me for her initial neuropsychological assessment on 3/25/2021 via telephone visit. At that time, she reported having difficulty with concentration and her mind \"going blank\" for the past 6 months.     As documented in my initial report, Ms. De La Rosa is the sole caregiver for her , who suffers from dementia and physical limitations following a major stroke he experienced in 2017. He subsequently relies on Ms. De La Rosa for all of his cares and is described as quite demanding and controlling (a longstanding trait that amplified after his stroke). There were several other stressful family dynamics going on. At that time, Ms. De La Rosa noted, \"I'm unhappy. And when I'm unhappy, I'm not thinking. I go blank.\"     Despite her frustrations with her cognition, she reported that she was fully independent in all ADLs and IADLs at that time. Results of her neuropsychological testing at the time (which was quite limited as it was performed via telephone) revealed variable processing speed and impaired phonemic fluency (possibly secondary to variable processing speed). All other domains were fully intact. Significant stressors and chronic pain were suspected to be the primary etiologies of her deficits at the time. Please refer to my report from that date for further detail " and recommendations offered.    INTERIM HISTORY.  Since that evaluation in March 2021, the patient has continued to experience cognitive decline and frequent falls resulting in various injuries. She sought a second opinion from neurologist at [facility] (Darshana Sheikh MD) on 7/9/2021. At that time, the patient reported having had about 10 falls since 2017, with 3-4 occurring within the past month. The patient also reported forgetfulness and tremor in her hands. Her neurologic exam that day was largely unremarkable, with no evidence of tremor, dysmetria, or abnormal gait. The neurologist suspected her falls may be secondary to hip flexor weakness related to her spinal canal stenosis. She was referred for physical therapy, an updated MRI of the cervical spine, and an EMG to rule out peripheral neuropathy vs. myopathy. The neurologist also recommended use of a cane, but the patient was uninterested in that. EMG and MRI did not show any high-grade stenosis.    The patient started PT on 8/9/2021, and their evaluation revealed a significant fear of falling, bilateral lower extremity weakness, core instability, abnormal gait pattern, and high fall risk based on TUG and Bustos balance assessment. She has participated in a few additional treatment sessions, most recently on 9/22/2021.    Ms. De La Rosa followed up with Dr. Terry on 8/27/2021. Her daughter, Lucie, accompanied her to that appointment and expressed great concern about her mother's declining cognition and stress. Dr. Terry felt compelled to evaluate these concerns further and subsequently ordered a repeat brain MRI, EEG, updated blood work and an updated in-person neuropsychological evaluation.    Updated MRI of the brain was completed on 9/13/2021 revealed only mild nonspecific white matter changes and mild brain parenchymal volume loss. Blood work dated 9/2/2021 was all normal (including folic acid, homocysteine, MMA, vitamin B1, vitamin B12, etc.). EEG dated  9/20/2021 was also read as normal.    CURRENT CLINICAL INTERVIEW:  Today, Ms. De La Rosa reported experiencing ongoing yet stable memory and concentration issues, which she largely attributes to ongoing stress. Her care giving responsibilities have increased since our last evaluation in March, as her 's medical status has further declined. There continue to be stressful family and cultural dynamics going on as well.    In contrast to the patient's reports, Lucie reported that her mother's memory issues are progressively worsening over time. Lucie started to become concerned early this year. Specifically, she has noticed that she is unable to focus, has difficulty staying on topic in conversation, sends texts and emails to the wrong people, and forgets conversations. She also feels that her mother's spatial perception is off, although that may be in part due to vision issues. Lucie feels that her mother is not willing to accept that she is having notable difficulty at home.     With regard to the activities of daily living, Ms. De La Rosa reported that she is mostly independent. She continues to live in a single family home with her . Lucie moved out of their home 2 months ago due to strained dynamics in the family. Lucie was helping her parents quite a bit when she was not at work, and she hoped that her moving out would force her mother to see that she needs more formal care giving assistance. While the patient is open to more formal help, her  is resistant to it and the patient submits to this for cultural reasons. They have also decided as a family to not place him in assisted living due to cultural reasons. The patient no longer drives because her  became overly critical of her driving. She now relies on Uber and Lyft services, but Lucie noted that her mother will occasionally tip the wrong amount or enter the incorrect address. The patient manages all of her medications, along with her  "'s medications. She clearly prioritizes her husbands needs over her own. She makes sure he gets all of his medications right on time, but she often forgets about her own medications and subsequently takes them late. Lucie has taken over some of the bill paying and financial management in order to help ease some of her mother's stress. She has access to all of their bank accounts but the patient continues to make all of the financial decisions. With regard to cooking, the patient stated that she gets frazzled when cooking because her  is quite particular and hovers over her while she makes meals. She also has been burning things more often and leaving out key ingredients at times. She receives assistance from a .    MEDICAL HISTORY:  As noted previously, Ms. De La Rosa has been experiencing more frequent falls over time. She recently fractured her nose, and has hist her head during some of the falls. While she denied any LOC or concussive symptoms, she acknowledged that recently she has been experiencing a headache in the right temporal area. She also continues to experience chronic pain in different areas of her body. Her typical level of pain is a 3 out of 10. Ms. De La Rosa's medical history is otherwise positive for hyperlipidemia, arthritis, sensorineural hearing loss, osteoporosis, and GERD. The patient denied any history of significant head injuries, known seizure, stroke, heart attack, tremor, balance issues and microsmia/anosmia. To her knowledge, she has never had COVID-19 and has now received both dosages of the vaccine.    The patient reported that her sleep is frequently disturbed and she experiences early morning awakening. She noted that she is on \"high alert\" with her , checking on him every time he moves in his sleep or makes noise of any kind. She also continues to wake up at 3 AM and is usually unable to fall back asleep. She stated that she takes 1 tablet of Sleep 3 (from " [employer]) per night for the past few months. She only takes Ambien on occasion and noted that she never takes Ambien and Sleep 3 together. Known symptoms of REM sleep behavior disorder were denied.    Past Surgical History:   Procedure Laterality Date     ARTHRODESIS FOOT  4/22/2013    Procedure: ARTHRODESIS FOOT;  Left Second and Third Tarsal metatarsal Arthrodesis, Bunion Correction, Lapidus Procedure, Weil Osteotomy   ;  Surgeon: Alber Pace MD;  Location: [facility] OR     BUNIONECTOMY LAPIDUS WITH TARSAL METATARSAL (TMT) FUSION  4/22/2013    Procedure: BUNIONECTOMY LAPIDUS WITH TARSAL METATARSAL (TMT) FUSION;;  Surgeon: Alber Pace MD;  Location: [facility] OR     C TOTAL KNEE ARTHROPLASTY      left     FOOT SURGERY      right bunionectomy.     FOOT SURGERY       HYSTERECTOMY       HYSTERECTOMY TOTAL ABDOMINAL, BILATERAL SALPINGO-OOPHORECTOMY, COMBINED       KNEE SURGERY       Diagnostic studies: Updated MRI of the brain was completed on 9/13/2021 revealed only mild nonspecific white matter changes and mild brain parenchymal volume loss. Blood work dated 9/2/2021 was all normal (including folic acid, homocysteine, MMA, vitamin B1, vitamin B12, etc.). EEG dated 9/20/2021 was also read as normal.    Current medications include (per medical record):   Current Outpatient Medications:      acetaminophen (TYLENOL) 325 MG tablet, Take 3 tablets by mouth every 8 hours., Disp: 100 tablet, Rfl: 0     calcium carbonate-vitamin D (CALCIUM + D) 600-200 MG-UNIT TABS, Take 1 tablet by mouth daily., Disp: , Rfl:      Celecoxib (CELEBREX PO), Take 200 mg by mouth daily., Disp: , Rfl:      Chlorpheniramine-Phenylephrine 4-10 MG TABS, , Disp: , Rfl:      Cholecalciferol (VITAMIN D3 PO), Take 1,000 Units by mouth daily., Disp: , Rfl:      denosumab (PROLIA) 60 MG/ML SOSY injection, Inject 60 mg Subcutaneous once, Disp: , Rfl:      DULoxetine (CYMBALTA) 60 MG capsule, Take 60 mg by mouth daily , Disp: , Rfl:      famotidine  (PEPCID) 40 MG tablet, TAKE 1 TABLET BY MOUTH TWICE A DAY, Disp: , Rfl:      fluocin-hydroquinone-tretinoin (TRI-RODNEY) 0.01-4-0.05 % CREA, Externally apply topically At Bedtime, Disp: 30 g, Rfl: 3     fluticasone (FLONASE) 50 MCG/ACT nasal spray, INSTILL 1 SPRAY INTO EACH NOSTRIL ONCE DAILY AS NEEDED FOR RHINITIS, Disp: , Rfl:      glucosamine-chondroitin 500-400 MG CAPS, Take 1 capsule by mouth daily., Disp: , Rfl:      losartan (COZAAR) 100 MG tablet, Take 100 mg by mouth, Disp: , Rfl:      medical cannabis (Patient's own supply), See Admin Instructions (The purpose of this order is to document that the patient reports taking medical cannabis.  This is not a prescription, and is not used to certify that the patient has a qualifying medical condition.) , Disp: , Rfl:      methocarbamol (ROBAXIN) 500 MG tablet, TAKE 1 TABLET BY MOUTH 3 TIMES A DAY, Disp: , Rfl:      multivitamin, therapeutic with minerals (MULTI-VITAMIN) TABS, Take 1 tablet by mouth daily., Disp: , Rfl:      naproxen sodium 220 MG capsule, Take 220 mg by mouth 2 times daily (with meals), Disp: , Rfl:      Omega-3 Fatty Acids (OMEGA-3 FISH OIL PO), Take 1,200 g by mouth every 7 days , Disp: , Rfl:      OMEPRAZOLE PO, , Disp: , Rfl:      pravastatin (PRAVACHOL) 40 MG tablet, Take 40 mg by mouth, Disp: , Rfl:      pregabalin (LYRICA) 100 MG capsule, Take 100 mg by mouth 2 times daily , Disp: , Rfl:      zolpidem (AMBIEN) 5 MG tablet, Take 5 mg by mouth, Disp: , Rfl:     RELEVANT FAMILY MEDICAL HISTORY:   Family neurologic history is unremarkable, with no known family history of dementia. Other family medical history is positive for the following:  Family History   Problem Relation Age of Onset     Coronary Artery Disease Mother      PSYCHIATRIC HISTORY:  With regard to her psychiatric history, Ms. De La Rosa endorsed a history of anxiety and depressed mood ever since her 's stroke in 2017 when her stress level significantly increased. Currently, the  "patient described her mood as \"more depressed and anxious at times, especially when my  is being unreasonable.\" She also endorsed stressors related to a longstanding conflict between her daughters (Lucie and their oldest daughter). Their other daughter plans to move in with them next week, and the patient worries how that will affect her relationship with Lucie. Lucie worries that her sister will add more responsibilities for their mother. The patient reported that she has a strong support system with her children and other good friends. She continues to reserve some time to herself in the morning before her  wakes up during which she prays, meditates, reads, and watches TV. Once her  awakens, she spends her entire day attending to him. Current or historical suicidal ideation was denied.    With regard to substance abuse, Ms. De La Rosa denied history of past drug or alcohol abuse or dependence. She has never been a smoker. Currently, she reported that she typically consumes 1-2 glasses of wine per night, which is something she started doing over the past year in order to help cope with stress. She no longer uses medicinal marijuana for pain management. Other current substance use was denied.    SOCIAL HISTORY:  Per my previous report, Ms. De La Rosa was born and raised in [facility]. She grew up speaking English, Melina, and Emigdio throughout her childhood. She attended English-speaking schools and spoke English in the home. She graduated high school and completed one year of college. She left college because she could no longer afford it. She earned mostly above average grades. Significant learning difficulties or developmental attention issues were denied. She was  via an arranged marriage in 1965, then lived in [place] from 1965 to 1981. She and her  moved to [place] in 1981 and have lived here ever since. She worked as an  and assisted her  with his real " [employer] business. She stopped working outside of the home in 2017 after her 's stroke. She has been  for 56 years, and they have 3 children together. The patient and her  live together in their own home in [place], [place].    TESTS ADMINISTERED:   Wechsler Memory Scale-III (WMS-III) select subtests, Wechsler Adult Intelligence Scale-IV (WAIS-IV) select subtests, Wide Range Achievement Test-4 (WRAT-4) select subtests, Wechsler Memory Scale-IV (WMS-IV) select subtests, Shearer Verbal Learning Test-R (HVLT-R), Trailmaking Test (Trails A & B), Controlled Oral Word Association Test (COWAT) and Category Fluency, [place] Naming Test-2 (BNT-2), Clock Drawing Test, Stroop Color and Word Test,  Generalized Anxiety Disorder-7 Assessment (SIS-7), Geriatric Depression Scale-Short Form (GDS-15), and Independent Living Scales (ILS) - Health & Safety subtest.    MOANS norms were used for BNT, Trail Making Test A & B, COWAT & Category Fluency    DESCRIPTIVE PERFORMANCE KEY:    Labels for tests with Normal Distributions  Score Label Standard Score %ile Rank   Exceptionally high score  > 130 > 98   Above average score 120-129 91-97   High average score 110-119 75-90   Average score  25-74   Low average score 80-89 9-24   Below average score 70-79 2-8   Exceptionally low score < 70 < 2     Labels for tests with Non-Normal Distributions  Score Label %ile Rank   Within normal expectations/ limits score (WNL) > 24   Low average score 9-24   Below average score 2-8   Exceptionally low score < 2     The following test results utilize score labels as adapted from Estiven Shoemaker, Mynor Sawyer, Gianna Hunter, MARKELL Lincoln, Cathy Kuo, Hernandez Ritter & Conference Participatnts (2020): American Academy of Clinical Neuropsychology consensus conference statement on uniform labeling of performance test scores, The Clinical Neuropsychologist, DOI:  10.1080/[id number]. All scores contain some measure of error; scores are reported here as they are obtained by the individual (without reference to the range of error). These are meant as labels and not interpretation of performance. While other relevant comments regarding task performance are provided below, please see the Summary, Impressions, and Diagnosis sections of this report for interpretation of the scores and the cognitive profile as a whole, including what does and does not constitute impairment for this particular individual.    COVID-19-ERA NEUROPSYCHOLOGY LIMITATIONS:  Due to the ongoing COVID-19 pandemic, this assessment was conducted with the examiner wearing [employer]-designated PPE and the patient wearing a face mask. The standard administration of these procedures involves direct face-to-face methods. The impact of applying non-standard administration methods has been evaluated only in part by scientific research. While every effort was made to simulate standard assessment practices, the diagnostic conclusions and recommendations for treatment provided in this report are being advanced with these limitations in mind.    BEHAVIORAL OBSERVATIONS:   Ms. De La Rosa arrived late and accompanied by her daughter, Lucie, to today's appointment. She was appropriately dressed and groomed. Gait and other motor activity appeared normal. She appeared alert and engaged. No vision or hearing difficulties were observed. Conversational speech was of normal rate, volume, and prosody. No significant word-finding pauses or paraphasias were noted. Her thought process appeared linear and goal-directed, although she tended to perseverate on her stressors. No hallucinations or delusions were apparent. Judgment and insight appeared largely intact. Her mood was euthymic and her affect was appropriately reactive. Rapport was easily established and eye contact was appropriate.     During the testing session,  Ms. De La Rosa was alert throughout. She was pleasant and cooperative throughout the evaluation. She frequently yawned but never mentioned that she was feeling tired and only took one brief break. She understood test instructions without difficulty. No frontal signs were observed behaviorally. Ms. De La Rosa appeared adequately motivated and engaged easily during testing. Her score on an embedded measure of performance validity was in the valid range. Overall, the following results are considered a reasonably valid estimation of her current cognitive abilities.    OPTIMAL PREMORBID INTELLECT:  Optimal premorbid intellectual abilities were estimated as falling in the average range based on Ms. De La Rosa's educational and occupational histories and performance on tasks least likely to be affected by acquired brain dysfunction.    SUMMARY OF TEST RESULTS:  ORIENTATION. Performance on a mental status exam, assessing orientation to personal and current information, resulted in a within normal limits score. She was oriented to person, place, time, and most of the date (although she stated that the day of the month was the 22nd instead of the 19th), and was able to correctly name the current and previous presidents.    ATTENTION/WORKING MEMORY. The patient's score on a measure sensitive to sustained auditory-verbal attention and concentration (WAIS-IV Digit Span) was classified as average, as she was able to recite up to 5 digits forward (an average score), up to 5 digits backward (an average score), and up to 4 digits in sequence (a low average score).      PROCESSING SPEED. On the WAIS-IV Processing Speed Index, the patient's score was low average (PSI = 84), with low average speeded digit-symbol coding (Coding), and average speeded visual scanning/cancellation (Symbol Search). On a test of complex concentration that requires speeded numeric sequencing (Trails A), the patient's score was below average for completion time and without  error. On the Stroop test, speeded color naming was low average, and speeded word reading was also low average.     LANGUAGE PROCESSING. Language comprehension appeared intact. The WAIS-IV Verbal Comprehension Index was below average (pro-rated VCI = 78), with a low average score on a measure of verbal abstract reasoning (Similarities), and a below average score on a fund of information task (Information). Confrontation naming was measured as a below average score (36/60; BNT-2). She benefited minimally from phonemic cues on that task (+11/24). The patient's performance on a test of phonemic fluency resulted in an exceptionally low score (COWAT), while her semantic fluency was average (Category Fluency). Her writing sample was intact and there was no evidence of micrographia.     VISUOSPATIAL/CONSTRUCTIONAL SKILLS. The WAIS-IV Perceptual Reasoning Index was measured as a below average score (pro-rated AURA = 77), with average nonverbal abstract reasoning (Matrix Reasoning), and below average visuoconstruction with three-dimensional blocks (Block Design). On a Clock Drawing Task, the patient was able to draw a large, well-formed [place] with equally spaced and correctly placed numbers. Her clock hands converged nicely in the center of the clock face and were differentiated by length.      LEARNING/MEMORY. On the WMS-IV Logical Memory subtest, immediate memory for two paragraph-length stories resulted in a average score. After a 20-minute delay, the patient's score on the delayed recall trial was low average with a 38% retention rate. On the recognition trial, the patient's score was classified as low average.    On a 12-item verbal list-learning task (HVLT-R), the patient acquired up to 6 words (50%) of the word list by the 3rd and final learning trial (raw scores over trials = 6, 5, 6). Total learning acquisition was classified as a low average score. After a 20-minute delay, the patient recalled 2 items, reflecting  an exceptionally low score with a 33% retention rate (also an exceptionally low score). Her recognition discriminability score was exceptionally low, as she correctly recognized 9/12 items and made 2 false-positive errors.     On a visual memory measure (WMS-IV Visual Reproduction), immediate recall of simple geometric figures was low average, and delayed recall of the figures was also low average (with a 48% retention rate). Delayed recognition of the figures was exceptionally low.     EXECUTIVE FUNCTIONS. On a test of inhibition and cognitive flexibility, (Stroop), the patient's score was low average for completion time and made 2 errors. On a test that requires speeded alpha-numeric sequencing/cognitive set-shifting (Trails B), performance was low average and with 1 error. Verbal and nonverbal abstract reasoning were low average and average, respectively (WAIS-IV Similarities and Matrix Reasoning). Phonemic fluency was exceptionally low (COWAT). Performance on the Clock Drawing Test was impaired, as she was unable to accurately represent analog time.     INDEPENDENT LIVING SKILLS. The patient was administered the Health & Safety subtest of the Independent Living Skills measure, which assesses the patient's judgment and problem-solving abilities as it pertains to various hypothetical health and safety dilemmas. Compared to a healthy, community-dwelling geriatric population, the patient's score was average.     MOOD. On the GDS-15 a self report measure of depressive symptomatology, she obtained a score of 2, placing her in the range of normal depression. She denied suicidal ideation. On the SIS-7, a self-report measure of anxiety, she obtained a score of 10,  placing her in the range of moderate anxiety.    ____________________________________________________________________________________    SERVICES PROVIDED & TIME:  On-site Office Visit Details   A clinical interview/neurobehavioral status examination was  conducted with the patient and documented. I thoroughly reviewed the medical record, selected the neuropsychological test battery, provided supervision to the trained examiner/technician, scored all of the neuropsychological tests (2+ tests), interpreted/integrated patient data and test results, and engaged in clinical decision making, treatment planning, report writing/preparation and provision of interactive feedback of test results on 10/25/2021. A trained examiner/technician administered the neuropsychological tests (2+ tests).  Please see below for a breakdown of time spent and the associated codes billed for these services.  Services   Time Spent  CPT Codes   Neurobehavioral Status Exam:  (e.g., clinical interview and neurobehavioral status exam, interpretation, report)   124 minutes   1 x 96116  1 x 96121   Neuropsychological Evaluation Services:   (e.g., integration, interpretation, treatment planning, clinical decision making, feedback via telehealth)   240 minutes   1 x 96132  3 x 96133   Neuropsychological Testing by Psychologist:  (e.g., test administration, scoring, 2+ tests administered)   58 minutes   1 x 96136  1 x 96137     Neuropsychological Testing by Trained Examiner/Technician:  (e.g., test administration, scoring, 2+ tests administered)   130 minutes   1 x 96138  3 x 96139   For diagnostic and coding purposes, Ms. De La Rosa has a history of hyperlipidemia, chronic pain, osteoporosis, sensorineural hearing loss, and frequent falls. 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 Please note, all charges are filed at the completion of the Episode of Care and associated with the final encounter date (feedback session on 10/25/2021).

## 2021-10-19 NOTE — PROGRESS NOTES
The patient was seen for a neuropsychological evaluation for the purposes of diagnostic clarification and treatment planning. 130 minutes of face-to-face testing were provided by this writer.The patient was cooperative with testing. No concerns were brought to my attention. Please see Dr. Mike's report for a detailed description of the charges and interpretation and integration of the findings.

## 2021-10-24 ENCOUNTER — DOCUMENTATION ONLY (OUTPATIENT)
Dept: OTHER | Facility: CLINIC | Age: 76
End: 2021-10-24

## 2021-10-25 ENCOUNTER — VIRTUAL VISIT (OUTPATIENT)
Dept: NEUROLOGY | Facility: CLINIC | Age: 76
End: 2021-10-25
Payer: MEDICARE

## 2021-10-25 DIAGNOSIS — F43.22 ADJUSTMENT DISORDER WITH ANXIOUS MOOD: ICD-10-CM

## 2021-10-25 DIAGNOSIS — G31.84 MILD NEUROCOGNITIVE DISORDER: Primary | ICD-10-CM

## 2021-10-25 NOTE — PATIENT INSTRUCTIONS
SUMMARY OF FINDINGS:   Due to the ongoing COVID-19 pandemic, this assessment was conducted using PPE worn by the examiner and a face-mask for the patient. The standard administration of these tests involves direct face-to-face methods. The full impact of applying non-standard administration methods with PPE is not fully appreciated at this time. As such, the diagnostic conclusions and recommendations for treatment provided in this report are being advanced with caution.    With these limitations in mind, results of testing indicate that Ms. De La Rosa is of estimated average premorbid intellectual functioning, and most of Ms. De La Rosa's performances are generally commensurate with that estimate. However, she exhibits borderline-to-mild impairment on tests of verbal memory, as well as some aspects of language processing (phonemic fluency and confrontation naming). In addition, her processing speed appears variable, with scores ranging from mildly impaired to average. She also exhibits mild impairment on a test of visuoconstruction using blocks; however, she could only use one hand to complete the task due to injury of her left hand, and all other tests of visuospatial/constructional abiltiies are within normal limits. Lastly, her learning of visual information is slightly below expectation; however, her delayed recall of that same information is within normal limits.    With regard to verbal learning/memory more specifically, the patient exhibits what appears to be a mild encoding deficit on a word list learning/memory task, as her delayed recall is mildly impaired and does not benefit significantly from prompts/cues on recognition testing. Her memory of two detailed stories is somewhat stronger but still slightly lower than expectation, as her delayed recall and recognition memory of the stories is low average.    All other cognitive domains are considered to be intact, including attention/concentration, executive  functions (including cognitive inhibition, mental flexibility/set-shifting, abstract reasoning, and judgment/problem solving of hypothetical health and safety scenarios), semantic fluency, and (as noted above) nonverbal memory and most visuospatial/constructional abilities.    Ms. De La Rosa was previously tested in March of this year. The testing at that time was quite limited as it was done via telehealth methods. As such, comparisons are also limited. That said, significant declines are observed on a test of verbal learning/memory encoding (the word list task), which fell from the solidly average range to the low average/mildly impaired range. No other memory measures were administered in March due to the nature of the telephone visit. Some aspects of attention/concentration also declined, but continue to be within normal limits. Verbal fluency remained stable for both phonemic and semantic trials.    Emotionally, the patient endorses moderate anxiety and minimal depressive symptoms on self-report questionnaires. This has remained stable when compared with her responses in March.    IMPRESSIONS:    The current deficits are of unclear etiology, but likely due to a combination of factors.     I continue to suspect that the high level of stress she is under (which has only increased since her previous evaluation with me in March) is still a significant contributing factor.    In addition, she is not getting good, restorative sleep due to her care giving burdens. Chronic sleep disturbances can contribute to greater cognitive deficits over time.    The patient is also taking some medications that may lead to cognitive side effects and increased risk of falls due to their anticholinergic and other properties (e.g., methocarbamol, chlorapheniramine-phenylephrine, and zolpidem).     Cultural factors may have influenced her performances on some tests (particularly the confrontation naming task).    Lastly, a neurodegenerative  syndrome cannot be fully ruled out. Some aspects of her profile are consistent with what can be seen in individuals with Alzheimer's disease (AD); however, other aspects of her profile are incongruent. In addition, the rate of progression over the past 7 months is quicker than what is typically seen in AD. There are no compelling features of a synucleinopathy, or vascular or frontotemporal dementia processes.    DIAGNOSIS:  Unspecified Mild Neurocognitive Disorder    Adjustment Disorder with Anxiety     RECOMMENDATIONS:  1) Ongoing neurologic care and monitoring is recommended.     2) If not medically contraindicated, Ms. De La Rosa may benefit from an empirical trial of a cognitive-enhancing medication.     3) Consider a PET scan to help clarify etiology of Ms. De La Rosa's cognitive decline.    4) Consider a referral to Medication Therapy Management (pharmacist) for a review of Ms. De La Rosa's current regimen and to provide recommendations in order to minimize any unwanted side effects.    5) Additional caregiving support for Ms. De La Rosa is crucial. There are several cultural factors at play, but I would highly encourage Ms. De La Rosa to consider formal in-home care giving services to assist her so that she can have some respite. Some other ideas for increased support include hiring a caregiver to come at night so that she can sleep, hiring a cook who is familiar at preparing  cuisine, or even having regularly scheduled times of respite when her daughters can attend to their dad so she can engage in her own self-care activities (e.g., every Saturday and Sunday from 12-5 PM).    6) Although the patient has declined this in the past, I would encourage her to consider establishing care with a psychotherapist for additional support. If she prefers a provider who is more familiar with her culture, there are a couple of options that I found in the community:    Jenny Mak of [facility] in [place] ([phone])    Fran Guteirrez of [employer] in [facility] ([phone])    7) Given her mild memory issues, occasional oversight is encouraged with more complex and/or novel tasks to ensure accuracy and safety (e.g., medication and financial management). She should allow herself extra time to complete these types of tasks as well.    8) The patient is encouraged to utilize cognitive compensatory strategies in daily life, including utilizing note pads, checklists, to-do lists, a calendar/planner, labeled alarm reminders, a GPS, a pillbox, and maintaining a daily morning and nighttime routine and an organized living/work environment.    9) Ms. De La Rosa is strongly encouraged to adhere closely to her medication regimen to help keep her cerebrovascular risk factors (e.g., hyperlipidemia) and other medical conditions well controlled. This may help prevent further exacerbations in cognitive decline in the future.    10) Neuropsychological follow-up is recommended in 12-18 months (or as clinically indicated) in order to monitor her cognitive status, help to clarify etiology, and update recommendations. The current test data can be used as a baseline to which future comparisons can be made.         Thank you for allowing me to participate in your care. Please contact me with any questions regarding the content of this report.        Lena Mike PsyD, LP  Licensed Clinical Neuropsychologist  [facility] [address], [address] [facility]  Phone: [phone]      Cognitive Strategies    Take notes! Keep a small notepad with you in your purse/pocket/bag and write things down that you want to remember. You might also keep a notepad in a convenient location in your home (e.g., next to your telephone or calendar). Taking your notes in a note pad (instead of on multiple post-it notes) might help you stay organized, and you will also remember where to go to look for the  notes that you took.    Create checklists, grocery lists, and to-do lists. Cross things off as you finish them.    Use a calendar or planner and get in the habit of checking it daily. Make it a part of your morning and/or nighttime routine to remind yourself of upcoming events, activities, or appointments. Cross the days off as they pass so that you can quickly glance at the calendar to know what day it is and what you have going on.    Set alarm reminders. For example, you can set an alarm to remind you to take your medications, turn off the oven, turn off the sprinkler outside, or to start getting ready for your appointment. If you use a smart phone, often times you can label the alarm that goes off so that you know what the alarm is for when it chimes.    Use a pillbox to follow your medication regimen. A pillbox acts as a nice visual cue to remind us to take our medications. If you have trouble remembering whether or not you have already taken your medications today, a pillbox can be extremely helpful to help with this issue.    Use a GPS when driving to help you stay on route.    When having an important conversation or completing an important task, reduce as many distractions in the environment as you can. For example, turn off the TV or the music in the background. Turn off or silence your cell phone. Clear your work area of everything that you do not need for the task at hand.    Establish set locations for certain items. For example, if you keep your keys on a hook by your door, you will always know where to go to look for them.     Maintain a daily routine, especially for morning and nighttime tasks.

## 2021-10-25 NOTE — LETTER
"    10/25/2021         RE: Leonardo De La Rosa  [address] [facility] [address]        Dear Colleague,    Thank you for referring your patient, Leonardo De La Rosa, to the [facility]. Please see a copy of my visit note below.    NEUROPSYCHOLOGY TELE-HEALTH FEEDBACK VISIT  [facility]      Video Visit  Leonardo De La Rosa is a 75 year old female who is being evaluated via a billable video visit.      The patient has been notified of the following:     \"This video visit will be conducted via a call between you and your provider. We have found that certain health care needs can be provided without the need for an in-person physical exam.  This service lets us provide the care you need with a video conversation. If during the course of the call the physician/provider feels a video visit is not appropriate, you will not be charged for this service.\"    Patient has given verbal consent to a Video visit? Yes  Consent has been obtained for this service by 1 care team member: yes.    Patient would like the video invitation sent by: Text to cell phone: [phone] ([employer])    Visit Summary  The purpose of today s appointment was to provide feedback regarding Ms. De La Rosa recent neuropsychological consult completed on 10/18/2021. Her daughter, Lucie, joined us for today's visit with the patient's permission. We began the session by discussing her experience during the evaluation. I provided Ms. De La Rosa with detailed feedback regarding her performance on cognitive testing and her pattern of cognitive strengths and weaknesses.  I briefly discussed my overall impressions. Unfortunately, the patient's cell phone battery was about to die, so the remainder of our discussion will have to be rescheduled for a different day.       Lena Mike PsyD,   Licensed Clinical Neuropsychologist  [facility]  [address], [address]  [place], " [place] [address]  Phone: [phone]      Video-Visit Details    Type of service:  Video Visit  Start Time: 3:30 PM  End Time: 3:52 PM    Originating Location (pt. Location): Home    Distant Location (provider location):  Remote work for [facility]    Mode of Communication:  Video Conference via D[employer]    For diagnostic and coding purposes, Ms. 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 As this is the final date for this Episode of Care (initiated on 10/18/2021) all charges for the entire Episode of Care will be filed today. Please see the 10/18/2021 evaluation for a detailed description of codes and services, including services provided today.      In brief:   1 x 96116  1 x 96121  1 x 96132  3 x 96133  1 x 96136  1 x 96137  1 x 96138  3 x 96139        Again, thank you for allowing me to participate in the care of your patient.        Sincerely,        Lena Mike Psy.D, LP

## 2021-10-25 NOTE — PROGRESS NOTES
"NEUROPSYCHOLOGY TELE-HEALTH FEEDBACK VISIT  [facility]      Video Visit  Leonardo De La Rosa is a 75 year old female who is being evaluated via a billable video visit.      The patient has been notified of the following:     \"This video visit will be conducted via a call between you and your provider. We have found that certain health care needs can be provided without the need for an in-person physical exam.  This service lets us provide the care you need with a video conversation. If during the course of the call the physician/provider feels a video visit is not appropriate, you will not be charged for this service.\"    Patient has given verbal consent to a Video visit? Yes  Consent has been obtained for this service by 1 care team member: yes.    Patient would like the video invitation sent by: Text to cell phone: [phone] ([employer])    Visit Summary  The purpose of today s appointment was to provide feedback regarding Ms. De La Rosa recent neuropsychological consult completed on 10/18/2021. Her daughter, Lucie, joined us for today's visit with the patient's permission. We began the session by discussing her experience during the evaluation. I provided Ms. De La Rosa with detailed feedback regarding her performance on cognitive testing and her pattern of cognitive strengths and weaknesses.  I briefly discussed my overall impressions. Unfortunately, the patient's cell phone battery was about to die, so the remainder of our discussion will have to be rescheduled for a different day.       Lena Mike PsyD, LP  Licensed Clinical Neuropsychologist  [address], [address]  [place] [address]  Phone: [phone]      Video-Visit Details    Type of service:  Video Visit  Start Time: 3:30 PM  End Time: 3:52 PM    Originating Location (pt. Location): Home    Distant Location (provider location):  [facility]    Mode of Communication:  Video Conference via [facility]    For diagnostic and coding purposes, Ms. 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 As this is the final date for this Episode of Care (initiated on 10/18/2021) all charges for the entire Episode of Care will be filed today. Please see the 10/18/2021 evaluation for a detailed description of codes and services, including services provided today.      In brief:   1 x 96116  1 x 96121  1 x 96132  3 x 96133  1 x 96136  1 x 96137  1 x 96138  3 x 96139

## 2021-10-26 ENCOUNTER — OFFICE VISIT (OUTPATIENT)
Dept: NEUROLOGY | Facility: CLINIC | Age: 76
End: 2021-10-26
Payer: MEDICARE

## 2021-10-26 VITALS
DIASTOLIC BLOOD PRESSURE: 60 MMHG | WEIGHT: 141 LBS | BODY MASS INDEX: 24.07 KG/M2 | HEIGHT: 64 IN | SYSTOLIC BLOOD PRESSURE: 133 MMHG | HEART RATE: 82 BPM

## 2021-10-26 DIAGNOSIS — F03.90 SENILE DEMENTIA WITH DELIRIUM WITHOUT BEHAVIORAL DISTURBANCE (H): ICD-10-CM

## 2021-10-26 DIAGNOSIS — F03.90 PRESENILE DEMENTIA, UNCOMPLICATED (H): Primary | ICD-10-CM

## 2021-10-26 DIAGNOSIS — F05 SENILE DEMENTIA WITH DELIRIUM WITHOUT BEHAVIORAL DISTURBANCE (H): ICD-10-CM

## 2021-10-26 PROBLEM — D13.1 BENIGN NEOPLASM OF STOMACH: Status: ACTIVE | Noted: 2021-01-29

## 2021-10-26 PROCEDURE — 99214 OFFICE O/P EST MOD 30 MIN: CPT | Performed by: PSYCHIATRY & NEUROLOGY

## 2021-10-26 ASSESSMENT — MIFFLIN-ST. JEOR: SCORE: 1124.57

## 2021-10-26 NOTE — NURSING NOTE
Chief Complaint   Patient presents with     Results     Discuss neuropsych results.     Flora Desouza LPN on 10/26/2021 at 11:22 AM

## 2021-10-26 NOTE — PROGRESS NOTES
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  She had extensive work-up including MRI, MRA, lab work and neuropsychological testing that was normal.  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  Since her last visit she had repeat MRI of the brain and lab work that included normal vitamin B1, B12, methylmalonic acid level, homocystine and folic acid.  She previously had a virtual neuropsychological testing and a in person neuropsychological testing was repeated on 10/18/2021 that suggested mild unspecified neurocognitive disorder with adjustment disorder and anxiety.  Respite care and PET scan was recommended in addition to psychotherapy.  Patient is not sure if therapy would be helpful but is willing to give it a try.  I am also ordering PET scan.  Afterwards I will consider starting her on Aricept if it confirms the diagnosis of a neurocognitive disorder    Frequent Falls  Patient was seen at [facility] and had MRI of cervical spine, EMG and MRI lumbar spine that did not show any high-grade stenosis.  They felt her weakness was related to hip flexor involvement due to spinal canal stenosis and was sent for physical therapy.  She is still not using cane and has fallen down.  She claims she is about to get right knee replacement and won't have any options after that as she will  need to use cane.    Thank you again for this referral, please feel free to contact me if you have any questions.    Augustin Terry MD  [facility]  (Formerly, [facility]     HISTORY OF PRESENT ILLNESS     Patient is a 75-year-old female with history of sensorineural hearing loss, osteoporosis, GERD last seen on April 12, 2021 who returns for follow-up for her pseudodementia.  Family was concerned about progressive cognitive decline and frequent fall at times not remembering events leading up to her fall.  Daughters were concerned that she was having memory difficulty and tends to get confused easily and fell down not remembering how the fall occurred.  She is under a lot of stress as she is taking care of her  who has dementia, H/O intracranial hemorrhage that is taking a huge emotional toll.  According to family members her  is  high maintenance due to cultural their issues and couple is having difficulty managing their life in [place] in later part of of their lives.  Work-up in the past included MRI of the brain and MRA that was normal.  Lab work included normal vitamin B12, TSH, vitamin B1, MMA, homocystine, folic acid and alpha-tocopherol.  Although she scored 23/30 on Carlos Eduardo cognitive assessment her neuropsychological testing did not find any cognitive disorder and it was felt patient had adjustment disorder with anxiety.  She was on Cymbalta and previously was confused about the dose of Cymbalta she was taking.  They also saw a neurologist at [facility] for a second opinion and  had MRI and EMG and was referred to [facility].  Daughter is concerned that her gait and cognition has declined significantly.  Her  was in the hospital and she is struggling to manage her  and her medical issues.  She has fallen on few times and was referred to physical therapy and [facility].  They had recommended a  cane but patient is embarrassed to use it.  Since her last visit, she had MRI of the brain that showed no acute abnormality.  Lab work included normal vitamin B12, vitamin B1, methylmalonic acid level, homocystine and folic acid.  Neuropsychological evaluation was repeated that shows mild Unspecified neurocognitive disorder with adjustment disorder and anxiety.  Respite care and PET scan was recommended in addition to psychotherapy.  Visit she reports continues to have short-term memory difficulty.  She has fallen down again.  According to daughters patient seems confused and they're afraid she is mixing medication.     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    Surgical History:  She  has a past surgical history that includes TOTAL KNEE ARTHROPLASTY; Foot surgery; Hysterectomy total abdominal, bilateral salpingo-oophorectomy, combined; Arthrodesis foot (4/22/2013); Bunionectomy lapidus with tarsal metatarsal (TMT) fusion (4/22/2013); Hysterectomy; knee surgery; and Foot surgery.     SOCIAL HISTORY     Reviewed, and she  reports that she has never smoked. She has never used smokeless tobacco. She reports that she does not drink alcohol and does not use drugs.     FAMILY HISTORY     Reviewed, and family history includes Coronary Artery Disease in her mother.     ALLERGIES     Allergies   Allergen Reactions     Acetaminophen      Estratest H.S. Other (See Comments) and Unknown     Other reaction(s): Other (see  "comments)  Increase lipids  Outside source did not list reaction  Increase lipids       Hydrocodone      Oxycodone      Propofol Nausea and Vomiting     Outside source states \"injectable and inhaled\"  Outside source states \"injectable and inhaled\"  Outside source states \"injectable and inhaled\"  Outside source states \"injectable and inhaled\"       Reglan      Estrogens Rash     Other reaction(s): Other (see comments)  Outside source did not list reaction  Estrogen patches, from patch only, ( Adhesive )  does tolerate estrogen other forms        Penicillins Nausea and Vomiting and Nausea     Other reaction(s): GI intolerance, GI Upset  Patient states upset stomach.  Patient states upset stomach.  Patient states upset stomach.  Patient states upset stomach.           REVIEW OF SYSTEMS     A 12 point review of system was performed and was negative except as outlined in the history of present illness.     HOME MEDICATIONS     Current Outpatient Rx   Medication Sig Dispense Refill     acetaminophen (TYLENOL) 325 MG tablet Take 3 tablets by mouth every 8 hours. 100 tablet 0     calcium carbonate-vitamin D (CALCIUM + D) 600-200 MG-UNIT TABS Take 1 tablet by mouth daily.       Celecoxib (CELEBREX PO) Take 200 mg by mouth daily.       Chlorpheniramine-Phenylephrine 4-10 MG TABS        Cholecalciferol (VITAMIN D3 PO) Take 1,000 Units by mouth daily.       denosumab (PROLIA) 60 MG/ML SOSY injection Inject 60 mg Subcutaneous once       DULoxetine (CYMBALTA) 60 MG capsule Take 60 mg by mouth daily        famotidine (PEPCID) 40 MG tablet TAKE 1 TABLET BY MOUTH TWICE A DAY       fluocin-hydroquinone-tretinoin (TRI-RODNEY) 0.01-4-0.05 % CREA Externally apply topically At Bedtime 30 g 3     fluticasone (FLONASE) 50 MCG/ACT nasal spray INSTILL 1 SPRAY INTO EACH NOSTRIL ONCE DAILY AS NEEDED FOR RHINITIS       glucosamine-chondroitin 500-400 MG CAPS Take 1 capsule by mouth daily.       losartan (COZAAR) 100 MG tablet Take 100 mg by mouth " "      methocarbamol (ROBAXIN) 500 MG tablet TAKE 1 TABLET BY MOUTH 3 TIMES A DAY       multivitamin, therapeutic with minerals (MULTI-VITAMIN) TABS Take 1 tablet by mouth daily.       naproxen sodium 220 MG capsule Take 220 mg by mouth 2 times daily (with meals)       Omega-3 Fatty Acids (OMEGA-3 FISH OIL PO) Take 1,200 g by mouth every 7 days        OMEPRAZOLE PO        pravastatin (PRAVACHOL) 40 MG tablet Take 40 mg by mouth       pregabalin (LYRICA) 100 MG capsule Take 100 mg by mouth 2 times daily        zolpidem (AMBIEN) 5 MG tablet Take 5 mg by mouth           PHYSICAL EXAM     Vital signs  /60 (BP Location: Left arm, Patient Position: Sitting)   Pulse 82   Ht 1.626 m (5' 4\")   Wt 64 kg (141 lb)   BMI 24.20 kg/m      Weight:   141 lbs 0 oz    Patient is alert and oriented x4 in no acute distress. Vital signs were reviewed and are documented in electronic medical record. Neck was supple, no carotid bruits, thyromegaly, JVD, or lymphadenopathy was noted.   NEUROLOGY EXAM:    Patient s speech was normal with no aphasia or dysarthria.  She appears somewhat confused    Funduscopic exam was normal, with normal cup to disc ratio. Cranial nerves II -XII were intact.  Except she is hard of hearing    Patient had normal mass, tone with 5/5 strength in the upper extremity proximally in the lower extremity 4+/5 distally 5/5    Sensation was intact to light touch, pinprick, and vibratory sensation.     Reflexes were 0 symmetrical with downgoing toes.     No dysmetria noted on FNF or HKS.     She has a wide-based gait and has trouble tandem walking Romberg negative     DIAGNOSTIC STUDIES     PERTINENT RADIOLOGY  Following imaging studies were reviewed:     CT BRAIN 12/17/2020  1.  No acute intracranial process.  2.  Minimal chronic small vessel ischemic disease.     CT LUMBAR SPINE 9/23/2020  1. New but subacute to chronic appearing fractures of L2, including mild  superior endplate compression and incompletely " healed bilateral pedicle  fractures, which on the left extend into the posterosuperior vertebral body.  2. Postoperative changes including anterior and posterior instrumented fusion at  L5-S1. No hardware failure. No advanced spinal canal or neural foraminal  narrowing at any level.     MRI BRAIN 9/13/2021  1. No evidence of acute intracranial hemorrhage, mass effect, or infarction.  2. Mild nonspecific white matter changes.  3. Mild brain parenchymal volume loss.    MRI CERVICAL SPINE 7/12/2021  1.  Mild central canal and left foraminal narrowing at C6-7  2.  Mild degenerative changes within the remainder of the cervical spine as above    MRI, MRA BRAIN 2/26/2021  HEAD MRI:   1.  Mild chronic small vessel ischemic changes and age-related changes.  2.  Otherwise normal brain MRI.    HEAD MRA:   1.  Normal MRA [place].    NECK MRA:  1.  Minimal plaque in the right carotid bulb, but no significant associated stenosis by NASCET criteria.  2.   Otherwise normal neck MRA.     MRI LUMBAR SPINE 9/23/2020  No new spondylolisthesis since 05/04/2020. Interval subacute  appearing superior L2 compression fracture and bilateral pedicle fractures.  Stable chronic healing fracture through the posterolateral left L3 vertebral  body and pedicle.     EMG 7/13/2021  1.  This is a normal electrodiagnostic study of bilateral lower extremities  2.  Specifically there is no electrodiagnostic evidence of a lumbar radiculopathy, plexopathy or peripheral apathy    NEUROPSYCHOLOGICAL EVALUATION 10/18/2021  DIAGNOSIS:  Unspecified Mild Neurocognitive Disorder     Adjustment Disorder with Anxiety      RECOMMENDATIONS:  1) Ongoing neurologic care and monitoring is recommended.      2) If not medically contraindicated, Ms. De La Rosa may benefit from an empirical trial of a cognitive-enhancing medication.      3) Consider a PET scan to help clarify etiology of Ms. De La Rosa's cognitive decline.     4) Consider a referral to Medication Therapy  Management (pharmacist) for a review of Ms. De La Rosa's current regimen and to provide recommendations in order to minimize any unwanted side effects.     5) Additional caregiving support for Ms. De La Rosa is crucial. There are several cultural factors at play, but I would highly encourage Ms. De La Rosa to consider formal in-home care giving services to assist her so that she can have some respite. Some other ideas for increased support include hiring a caregiver to come at night so that she can sleep, hiring a cook who is familiar at preparing [place] cuisine, or even having regularly scheduled times of respite when her daughters can attend to their dad so she can engage in her own self-care activities (e.g., every Saturday and Sunday from 12-5 PM).     6) Although the patient has declined this in the past, I would encourage her to consider establishing care with a psychotherapist for additional support. If she prefers a provider who is more familiar with her culture, there are a couple of options that I found in the community:  Jenny Mak of [facility] [place] in [place] ([phone])  Fran Gutierrez of [employer] [facility] in [facility] ([phone])     7) Given her mild memory issues, occasional oversight is encouraged with more complex and/or novel tasks to ensure accuracy and safety (e.g., medication and financial management). She should allow herself extra time to complete these types of tasks as well.     8) The patient is encouraged to utilize cognitive compensatory strategies in daily life, including utilizing note pads, checklists, to-do lists, a calendar/planner, labeled alarm reminders, a GPS, a pillbox, and maintaining a daily morning and nighttime routine and an organized living/work environment.     9) Ms. De La Rosa is strongly encouraged to adhere closely to her medication regimen to help keep her cerebrovascular risk factors (e.g., hyperlipidemia) and other medical conditions  well controlled. This may help prevent further exacerbations in cognitive decline in the future.     10) Neuropsychological follow-up is recommended in 12-18 months (or as clinically indicated) in order to monitor her cognitive status, help to clarify etiology, and update recommendations. The current test data can be used as a baseline to which future comparisons can be made.    NEUROPSYCHOLOGICAL EVALUATION 3/25/2021  No Cognitive Disorder     Adjustment Disorder with Anxiety     RECOMMENDATIONS:  1) Stress management strategies are strongly encouraged, as improvement in emotional distress will lead to perceived improvements in her cognitive functioning (and even her physical pain level to some degree) over time. Such strategies may include:  Participating in individual psychotherapy. If the patient is amenable, I will refer her to our psychologist (Dr. Flavia Rivera). I will have our schedulers call her to set up an appointment, or she can always call our clinic at a later time to establish care ([phone]).  Consider an adjustment to her medications for her mood/anxiety. She experienced side effects with increased dosing of Cymbalta; as such, consider an alternative medication for anxiety (e.g., a selective serotonin reuptake inhibitor). This may help with her daytime anxiety as well as the heightened anxiety she experiences at night (she fears her sleep medications will make her too sedated to respond to her 's needs, but her anxiety about this keeps her from sleeping and increases her hypervigilance at night as well). This recommendation will be deferred to her PCP.  Utilizing relaxation practices. I will mail her a handout with some techniques she may try on her own.  Attending caregiving support groups. She may find these through the Alzheimer's Association website or hotline (https://www.alz.org/ or by calling 074-440-1059 ) or through the [employer] Line ([place]://[place].gov/[employer]-line/ or  by calling 296-585-2408).     2) In addition, a referral to a  or  or calling the Senior Linkage Line may be helpful to assist her in seeking out respite services or formal in-home care giving services to allow Ms. De La Rosa to take a break or re-engage in her hobbies and interests.     3) Ms. De La Rosa is also encouraged to follow up with her medical providers for ongoing pain management. Improvements in her pain may help her stress level and cognitive functioning to some degree.     4) Ms. De La Rosa should adhere closely to her medication regimen in order to manage her cerebrovascular risk factors (hyperlipidemia), pain, and mood. This may help to prevent future cognitive decline.     5) The patient is encouraged to remain physically, socially, and mentally active for optimal brain health. This may also help to improve her emotional wellbeing.     6) From a neurocognitive standpoint, I have no concerns about the patient's ability to independently perform IADLs or care for her .      7) Cognitive strategies may be helpful for her own reassurance. These may include utilizing note pads, checklists, to-do lists, alarm reminders, a detailed calendar/planner, a GPS, a pillbox, and maintaining a daily morning and nighttime routine and an organized living environment. She should avoid multi-tasking when possible and should attempt to reduce as much distraction as possible while performing complex or important tasks.     8) Neuropsychological follow-up is not warranted at this time. However, the current test data can now serve as a baseline should a repeat assessment be indicated in the future     PERTINENT LABS  Following labs were reviewed:  Admission on 08/16/2021, Discharged on 08/16/2021   Component Date Value     Color Urine 08/16/2021 Colorless      Appearance Urine 08/16/2021 Clear      Glucose Urine 08/16/2021 Negative      Bilirubin Urine 08/16/2021 Negative      Ketones Urine 08/16/2021  Negative      Specific Gravity Urine 08/16/2021 1.008      Blood Urine 08/16/2021 Negative      pH Urine 08/16/2021 6.0      Protein Albumin Urine 08/16/2021 Negative      Urobilinogen Urine 08/16/2021 <2.0      Nitrite Urine 08/16/2021 Negative      Leukocyte Esterase Urine 08/16/2021 Negative      Mucus Urine 08/16/2021 Present*     RBC Urine 08/16/2021 <1      WBC Urine 08/16/2021 <1        Ref Range & Units 2 wk ago   VITAMIN B12 180 - 914 pg/mL 808      Ref Range & Units 2 wk ago    VITAMIN B1 WHL BLD 66.5 - 200.0 nmol/L 116.7       Ref Range & Units 2 wk ago   Methylmalonic Acid 0 - 378 nmol/L 318       Ref Range & Units 2 wk ago   HOMOCYSTEINE,TOTAL 5.0 - 15.4 umol/L 12.1      Ref Range & Units 2 wk ago   FOLIC ACID >=4.0 ng/mL >20.0        Total time spent for face to face visit, reviewing labs/imaging studies, counseling and coordination of care was: 30 Minutes spent on the date of the encounter doing chart review, review of outside records, review of test results, interpretation of tests, patient visit, documentation and discussion with family     This note was dictated using voice recognition software.  Any grammatical or context distortions are unintentional and inherent to the software.    Orders Placed This Encounter   Procedures     PET Brain      New Prescriptions    No medications on file     Modified Medications    No medications on file

## 2021-10-26 NOTE — LETTER
10/26/2021         RE: Leonardo De La Rosa  [address] [facility] [address]        Dear Colleague,    Thank you for referring your patient, Leonardo De La Rosa, to the [facility] [place]. Please see a copy of my visit note below.    NEUROLOGY FOLLOW UP VISIT  NOTE       [facility] [place]  [address] [place] [address]  Tel: [phone]  Fax: [phone]  [email or web address]     Leonardo De La Rosa,  1945, MRN [id number]  PCP: Cassie Ortega  Date: 10/26/2021      ASSESSMENT & PLAN     Visit Diagnosis  1.  Presenile dementia, uncomplicated (H)  2. 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  She had extensive work-up including MRI, MRA, lab work and neuropsychological testing that was normal.  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  Since her last visit she had repeat MRI of the brain and lab work that included normal vitamin B1, B12, methylmalonic acid level, homocystine and folic acid.  She previously had a virtual neuropsychological testing and a in person neuropsychological testing was repeated on 10/18/2021 that suggested mild unspecified neurocognitive disorder with adjustment disorder and anxiety.  Respite care and PET scan was recommended in addition to psychotherapy.  Patient is not sure if therapy would be helpful but is willing to give it a try.  I am also ordering PET scan.  Afterwards I will consider starting her on Aricept if it confirms the diagnosis of a neurocognitive disorder    Frequent Falls  Patient was seen at [facility] and had MRI of cervical spine, EMG and MRI lumbar spine that did not show any high-grade stenosis.  They felt her  weakness was related to hip flexor involvement due to spinal canal stenosis and was sent for physical therapy.  She is still not using cane and has fallen down.  She claims she is about to get right knee replacement and won't have any options after that as she will need to use cane.    Thank you again for this referral, please feel free to contact me if you have any questions.    Augustin Terry MD  [facility] [place]  (Formerly, [facility]     HISTORY OF PRESENT ILLNESS     Patient is a 75-year-old female with history of sensorineural hearing loss, osteoporosis, GERD last seen on April 12, 2021 who returns for follow-up for her pseudodementia.  Family was concerned about progressive cognitive decline and frequent fall at times not remembering events leading up to her fall.  Daughters were concerned that she was having memory difficulty and tends to get confused easily and fell down not remembering how the fall occurred.  She is under a lot of stress as she is taking care of her  who has dementia, H/O intracranial hemorrhage that is taking a huge emotional toll.  According to family members her  is  high maintenance due to cultural their issues and couple is having difficulty managing their life in [place] in later part of of their lives.  Work-up in the past included MRI of the brain and MRA that was normal.  Lab work included normal vitamin B12, TSH, vitamin B1, MMA, homocystine, folic acid and alpha-tocopherol.  Although she scored 23/30 on Carlos Eduardo cognitive assessment her neuropsychological testing did not find any cognitive disorder and it was felt patient had adjustment disorder with anxiety.  She was on Cymbalta and previously was confused about the dose of Cymbalta she was taking.  They also saw a neurologist at [facility] for a second opinion and  had MRI and EMG and was referred to [facility].  Daughter is concerned that her gait  and cognition has declined significantly.  Her  was in the hospital and she is struggling to manage her  and her medical issues.  She has fallen on few times and was referred to physical therapy and [facility].  They had recommended a cane but patient is embarrassed to use it.  Since her last visit, she had MRI of the brain that showed no acute abnormality.  Lab work included normal vitamin B12, vitamin B1, methylmalonic acid level, homocystine and folic acid.  Neuropsychological evaluation was repeated that shows mild Unspecified neurocognitive disorder with adjustment disorder and anxiety.  Respite care and PET scan was recommended in addition to psychotherapy.  Visit she reports continues to have short-term memory difficulty.  She has fallen down again.  According to daughters patient seems confused and they're afraid she is mixing medication.     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    Surgical History:  She  has a past surgical history that includes TOTAL KNEE ARTHROPLASTY; Foot surgery; Hysterectomy total abdominal, bilateral salpingo-oophorectomy, combined; Arthrodesis foot (4/22/2013); Bunionectomy lapidus with tarsal metatarsal (TMT) fusion (4/22/2013); Hysterectomy; knee surgery; and Foot surgery.     SOCIAL HISTORY     Reviewed, and she  reports that she has never smoked. She has never used smokeless tobacco. She reports that she does not drink alcohol and does not use  "drugs.     FAMILY HISTORY     Reviewed, and family history includes Coronary Artery Disease in her mother.     ALLERGIES     Allergies   Allergen Reactions     Acetaminophen      Estratest H.S. Other (See Comments) and Unknown     Other reaction(s): Other (see comments)  Increase lipids  Outside source did not list reaction  Increase lipids       Hydrocodone      Oxycodone      Propofol Nausea and Vomiting     Outside source states \"injectable and inhaled\"  Outside source states \"injectable and inhaled\"  Outside source states \"injectable and inhaled\"  Outside source states \"injectable and inhaled\"       Reglan      Estrogens Rash     Other reaction(s): Other (see comments)  Outside source did not list reaction  Estrogen patches, from patch only, ( Adhesive )  does tolerate estrogen other forms        Penicillins Nausea and Vomiting and Nausea     Other reaction(s): GI intolerance, GI Upset  Patient states upset stomach.  Patient states upset stomach.  Patient states upset stomach.  Patient states upset stomach.           REVIEW OF SYSTEMS     A 12 point review of system was performed and was negative except as outlined in the history of present illness.     HOME MEDICATIONS     Current Outpatient Rx   Medication Sig Dispense Refill     acetaminophen (TYLENOL) 325 MG tablet Take 3 tablets by mouth every 8 hours. 100 tablet 0     calcium carbonate-vitamin D (CALCIUM + D) 600-200 MG-UNIT TABS Take 1 tablet by mouth daily.       Celecoxib (CELEBREX PO) Take 200 mg by mouth daily.       Chlorpheniramine-Phenylephrine 4-10 MG TABS        Cholecalciferol (VITAMIN D3 PO) Take 1,000 Units by mouth daily.       denosumab (PROLIA) 60 MG/ML SOSY injection Inject 60 mg Subcutaneous once       DULoxetine (CYMBALTA) 60 MG capsule Take 60 mg by mouth daily        famotidine (PEPCID) 40 MG tablet TAKE 1 TABLET BY MOUTH TWICE A DAY       fluocin-hydroquinone-tretinoin (TRI-RODNEY) 0.01-4-0.05 % CREA Externally apply topically At " "Bedtime 30 g 3     fluticasone (FLONASE) 50 MCG/ACT nasal spray INSTILL 1 SPRAY INTO EACH NOSTRIL ONCE DAILY AS NEEDED FOR RHINITIS       glucosamine-chondroitin 500-400 MG CAPS Take 1 capsule by mouth daily.       losartan (COZAAR) 100 MG tablet Take 100 mg by mouth       methocarbamol (ROBAXIN) 500 MG tablet TAKE 1 TABLET BY MOUTH 3 TIMES A DAY       multivitamin, therapeutic with minerals (MULTI-VITAMIN) TABS Take 1 tablet by mouth daily.       naproxen sodium 220 MG capsule Take 220 mg by mouth 2 times daily (with meals)       Omega-3 Fatty Acids (OMEGA-3 FISH OIL PO) Take 1,200 g by mouth every 7 days        OMEPRAZOLE PO        pravastatin (PRAVACHOL) 40 MG tablet Take 40 mg by mouth       pregabalin (LYRICA) 100 MG capsule Take 100 mg by mouth 2 times daily        zolpidem (AMBIEN) 5 MG tablet Take 5 mg by mouth           PHYSICAL EXAM     Vital signs  /60 (BP Location: Left arm, Patient Position: Sitting)   Pulse 82   Ht 1.626 m (5' 4\")   Wt 64 kg (141 lb)   BMI 24.20 kg/m      Weight:   141 lbs 0 oz    Patient is alert and oriented x4 in no acute distress. Vital signs were reviewed and are documented in electronic medical record. Neck was supple, no carotid bruits, thyromegaly, JVD, or lymphadenopathy was noted.   NEUROLOGY EXAM:    Patient s speech was normal with no aphasia or dysarthria.  She appears somewhat confused    Funduscopic exam was normal, with normal cup to disc ratio. Cranial nerves II -XII were intact.  Except she is hard of hearing    Patient had normal mass, tone with 5/5 strength in the upper extremity proximally in the lower extremity 4+/5 distally 5/5    Sensation was intact to light touch, pinprick, and vibratory sensation.     Reflexes were 0 symmetrical with downgoing toes.     No dysmetria noted on FNF or HKS.     She has a wide-based gait and has trouble tandem walking Romberg negative     DIAGNOSTIC STUDIES     PERTINENT RADIOLOGY  Following imaging studies were " reviewed:     CT BRAIN 12/17/2020  1.  No acute intracranial process.  2.  Minimal chronic small vessel ischemic disease.     CT LUMBAR SPINE 9/23/2020  1. New but subacute to chronic appearing fractures of L2, including mild  superior endplate compression and incompletely healed bilateral pedicle  fractures, which on the left extend into the posterosuperior vertebral body.  2. Postoperative changes including anterior and posterior instrumented fusion at  L5-S1. No hardware failure. No advanced spinal canal or neural foraminal  narrowing at any level.     MRI BRAIN 9/13/2021  1. No evidence of acute intracranial hemorrhage, mass effect, or infarction.  2. Mild nonspecific white matter changes.  3. Mild brain parenchymal volume loss.    MRI CERVICAL SPINE 7/12/2021  1.  Mild central canal and left foraminal narrowing at C6-7  2.  Mild degenerative changes within the remainder of the cervical spine as above    MRI, MRA BRAIN 2/26/2021  HEAD MRI:   1.  Mild chronic small vessel ischemic changes and age-related changes.  2.  Otherwise normal brain MRI.    HEAD MRA:   1.  Normal MRA [place].    NECK MRA:  1.  Minimal plaque in the right carotid bulb, but no significant associated stenosis by NASCET criteria.  2.   Otherwise normal neck MRA.     MRI LUMBAR SPINE 9/23/2020  No new spondylolisthesis since 05/04/2020. Interval subacute  appearing superior L2 compression fracture and bilateral pedicle fractures.  Stable chronic healing fracture through the posterolateral left L3 vertebral  body and pedicle.     EMG 7/13/2021  1.  This is a normal electrodiagnostic study of bilateral lower extremities  2.  Specifically there is no electrodiagnostic evidence of a lumbar radiculopathy, plexopathy or peripheral apathy    NEUROPSYCHOLOGICAL EVALUATION 10/18/2021  DIAGNOSIS:  Unspecified Mild Neurocognitive Disorder     Adjustment Disorder with Anxiety      RECOMMENDATIONS:  1) Ongoing neurologic care and monitoring is  recommended.      2) If not medically contraindicated, Ms. De La Rosa may benefit from an empirical trial of a cognitive-enhancing medication.      3) Consider a PET scan to help clarify etiology of Ms. De La Rosa's cognitive decline.     4) Consider a referral to Medication Therapy Management (pharmacist) for a review of Ms. De La Rosa's current regimen and to provide recommendations in order to minimize any unwanted side effects.     5) Additional caregiving support for Ms. De La Rosa is crucial. There are several cultural factors at play, but I would highly encourage Ms. De La Rosa to consider formal in-home care giving services to assist her so that she can have some respite. Some other ideas for increased support include hiring a caregiver to come at night so that she can sleep, hiring a cook who is familiar at preparing  cuisine, or even having regularly scheduled times of respite when her daughters can attend to their dad so she can engage in her own self-care activities (e.g., every Saturday and Sunday from 12-5 PM).     6) Although the patient has declined this in the past, I would encourage her to consider establishing care with a psychotherapist for additional support. If she prefers a provider who is more familiar with her culture, there are a couple of options that I found in the community:    Jenny Mak of [facility] in [place] ([phone])    Fran Gutierrez of [employer] in [facility] ([phone])     7) Given her mild memory issues, occasional oversight is encouraged with more complex and/or novel tasks to ensure accuracy and safety (e.g., medication and financial management). She should allow herself extra time to complete these types of tasks as well.     8) The patient is encouraged to utilize cognitive compensatory strategies in daily life, including utilizing note pads, checklists, to-do lists, a calendar/planner, labeled alarm reminders, a GPS, a pillbox,  and maintaining a daily morning and nighttime routine and an organized living/work environment.     9) Ms. De La Rosa is strongly encouraged to adhere closely to her medication regimen to help keep her cerebrovascular risk factors (e.g., hyperlipidemia) and other medical conditions well controlled. This may help prevent further exacerbations in cognitive decline in the future.     10) Neuropsychological follow-up is recommended in 12-18 months (or as clinically indicated) in order to monitor her cognitive status, help to clarify etiology, and update recommendations. The current test data can be used as a baseline to which future comparisons can be made.    NEUROPSYCHOLOGICAL EVALUATION 3/25/2021  No Cognitive Disorder     Adjustment Disorder with Anxiety     RECOMMENDATIONS:  1) Stress management strategies are strongly encouraged, as improvement in emotional distress will lead to perceived improvements in her cognitive functioning (and even her physical pain level to some degree) over time. Such strategies may include:  ? Participating in individual psychotherapy. If the patient is amenable, I will refer her to our psychologist (Dr. Flavia Rivera). I will have our schedulers call her to set up an appointment, or she can always call our clinic at a later time to establish care ([phone]).  ? Consider an adjustment to her medications for her mood/anxiety. She experienced side effects with increased dosing of Cymbalta; as such, consider an alternative medication for anxiety (e.g., a selective serotonin reuptake inhibitor). This may help with her daytime anxiety as well as the heightened anxiety she experiences at night (she fears her sleep medications will make her too sedated to respond to her 's needs, but her anxiety about this keeps her from sleeping and increases her hypervigilance at night as well). This recommendation will be deferred to her PCP.  ? Utilizing relaxation practices. I will mail her a handout  with some techniques she may try on her own.  ? Attending caregiving support groups. She may find these through the Alzheimer's Association website or hotline (https://www.alz.org/ or by calling 003-891-9386 ) or through the [employer]ues Line ([place]://[place].gov/[employer]ucky[employer]-line/ or by calling 488-546-7688).     2) In addition, a referral to a  or  or calling the I [employer] Venues Line may be helpful to assist her in seeking out respite services or formal in-home care giving services to allow Ms. De La Rosa to take a break or re-engage in her hobbies and interests.     3) Ms. De La Rosa is also encouraged to follow up with her medical providers for ongoing pain management. Improvements in her pain may help her stress level and cognitive functioning to some degree.     4) Ms. De La Rosa should adhere closely to her medication regimen in order to manage her cerebrovascular risk factors (hyperlipidemia), pain, and mood. This may help to prevent future cognitive decline.     5) The patient is encouraged to remain physically, socially, and mentally active for optimal brain health. This may also help to improve her emotional wellbeing.     6) From a neurocognitive standpoint, I have no concerns about the patient's ability to independently perform IADLs or care for her .      7) Cognitive strategies may be helpful for her own reassurance. These may include utilizing note pads, checklists, to-do lists, alarm reminders, a detailed calendar/planner, a GPS, a pillbox, and maintaining a daily morning and nighttime routine and an organized living environment. She should avoid multi-tasking when possible and should attempt to reduce as much distraction as possible while performing complex or important tasks.     8) Neuropsychological follow-up is not warranted at this time. However, the current test data can now serve as a baseline should a repeat assessment be indicated in the future     PERTINENT LABS  Following  labs were reviewed:  Admission on 08/16/2021, Discharged on 08/16/2021   Component Date Value     Color Urine 08/16/2021 Colorless      Appearance Urine 08/16/2021 Clear      Glucose Urine 08/16/2021 Negative      Bilirubin Urine 08/16/2021 Negative      Ketones Urine 08/16/2021 Negative      Specific Gravity Urine 08/16/2021 1.008      Blood Urine 08/16/2021 Negative      pH Urine 08/16/2021 6.0      Protein Albumin Urine 08/16/2021 Negative      Urobilinogen Urine 08/16/2021 <2.0      Nitrite Urine 08/16/2021 Negative      Leukocyte Esterase Urine 08/16/2021 Negative      Mucus Urine 08/16/2021 Present*     RBC Urine 08/16/2021 <1      WBC Urine 08/16/2021 <1        Ref Range & Units 2 wk ago   VITAMIN B12 180 - 914 pg/mL 808      Ref Range & Units 2 wk ago    VITAMIN B1 WHL BLD 66.5 - 200.0 nmol/L 116.7       Ref Range & Units 2 wk ago   Methylmalonic Acid 0 - 378 nmol/L 318       Ref Range & Units 2 wk ago   HOMOCYSTEINE,TOTAL 5.0 - 15.4 umol/L 12.1      Ref Range & Units 2 wk ago   FOLIC ACID >=4.0 ng/mL >20.0        Total time spent for face to face visit, reviewing labs/imaging studies, counseling and coordination of care was: 30 Minutes spent on the date of the encounter doing chart review, review of outside records, review of test results, interpretation of tests, patient visit, documentation and discussion with family       This note was dictated using voice recognition software.  Any grammatical or context distortions are unintentional and inherent to the software.    Orders Placed This Encounter   Procedures     PET Brain      New Prescriptions    No medications on file     Modified Medications    No medications on file               Again, thank you for allowing me to participate in the care of your patient.        Sincerely,        Augustin Terry MD

## 2021-10-26 NOTE — PATIENT INSTRUCTIONS
Patient Education     Dementia: Coping Tips for Caregivers   When someone you love has dementia, it s normal to want to do as much as you can to help. But you can t take good care of someone else if you don t take care of yourself, too. So be sure to take breaks when you need them. It s not selfish. It s essential. Get out to see friends. Eat right. And be sure to visit your own doctor for regular checkups. Most of all, accept that you can t do everything yourself.    Make time for you  It s vital for you to spend time outside your role as caregiver. It may not feel right at first. But even simple things can ease stress and keep you refreshed. Try the following:    Go to a movie or concert.    Go to the gym.    Have a meal with friends.    Take a walk.    Read a book or write in a journal.    Pursue a hobby.  Talk to others  Talking to others is often a big stress reliever. Sometimes you just need a friend or family member to listen. At other times, you may want to talk to a professional you trust. This could be a counselor, , , or therapist. Another good option is joining a local support group for caregivers. Joining a support group can help you feel that you aren t alone. It s also reassuring to share thoughts and ideas with others who are going through the same things as you.  Get temporary help  Finding time away isn t always easy. But you do have options for respite care (temporary help). Draw on support from family and friends. And accept help when it s offered. People who care about you and your loved one really do want to help. Try these tips:    Ask a friend to spend the evening with your loved one.    Hire a home healthcare worker for regular breaks.    Take your loved one to adult day care.    Have family or friends help by shopping or bringing over a meal once a week.    Contact local support agencies or a  for respite care recommendations.  Accept your  emotions  The stress of caregiving can seem overwhelming at times. You may feel frustrated, sad, or resentful. This isn t a sign you re doing something wrong. It s completely normal. So accept these emotions as they come. However, if you find yourself feeling hopeless, tired, sad, or guilty most of the time, talk to your healthcare provider. These feelings may be signs of depression, which can and should be treated.  Know when to make a change  The time may come when you can no longer care for your loved one safely. It may be that he or she requires more supervision. Or, you may find it too hard to cope with the daily stresses of caregiving. No matter the reason, it s OK to make a change. It doesn t mean you ve failed. Changing the situation may be best for everyone. You ll still be able to spend plenty of quality time with your loved one.  Janel last reviewed this educational content on 6/1/2019 2000-2021 [employer]. All rights reserved. This information is not intended as a substitute for professional medical care. Always follow your healthcare professional's instructions.

## 2021-10-27 ENCOUNTER — VIRTUAL VISIT (OUTPATIENT)
Dept: NEUROLOGY | Facility: CLINIC | Age: 76
End: 2021-10-27
Payer: MEDICARE

## 2021-10-27 DIAGNOSIS — G31.84 MILD NEUROCOGNITIVE DISORDER: Primary | ICD-10-CM

## 2021-10-27 DIAGNOSIS — F43.22 ADJUSTMENT DISORDER WITH ANXIOUS MOOD: ICD-10-CM

## 2021-10-27 PROCEDURE — 96138 PSYCL/NRPSYC TECH 1ST: CPT | Mod: 59 | Performed by: CLINICAL NEUROPSYCHOLOGIST

## 2021-10-27 PROCEDURE — 96132 NRPSYC TST EVAL PHYS/QHP 1ST: CPT | Mod: 95 | Performed by: CLINICAL NEUROPSYCHOLOGIST

## 2021-10-27 PROCEDURE — 96133 NRPSYC TST EVAL PHYS/QHP EA: CPT | Mod: 95 | Performed by: CLINICAL NEUROPSYCHOLOGIST

## 2021-10-27 PROCEDURE — 96116 NUBHVL XM PHYS/QHP 1ST HR: CPT | Performed by: CLINICAL NEUROPSYCHOLOGIST

## 2021-10-27 PROCEDURE — 96137 PSYCL/NRPSYC TST PHY/QHP EA: CPT | Performed by: CLINICAL NEUROPSYCHOLOGIST

## 2021-10-27 PROCEDURE — 96139 PSYCL/NRPSYC TST TECH EA: CPT | Mod: 59 | Performed by: CLINICAL NEUROPSYCHOLOGIST

## 2021-10-27 PROCEDURE — 96136 PSYCL/NRPSYC TST PHY/QHP 1ST: CPT | Performed by: CLINICAL NEUROPSYCHOLOGIST

## 2021-10-27 PROCEDURE — 96121 NUBHVL XM PHY/QHP EA ADDL HR: CPT | Performed by: CLINICAL NEUROPSYCHOLOGIST

## 2021-10-27 NOTE — PROGRESS NOTES
"NEUROPSYCHOLOGY TELE-HEALTH FEEDBACK VISIT  [facility]    Telephone Visit  Leonardo De La Rosa is a 75 year old female who is being evaluated via a billable telephone visit.     The patient has been notified of the following:      \"This telephone visit will be conducted via a call between you and your provider. We have found that certain health care needs can be provided without the need for a physical exam.  This service lets us provide the care you need with a phone conversation. If during the course of the call the provider feels a telephone visit is not appropriate, you will not be charged for this service.\"     Patient has given verbal consent to a Telephone visit? Yes  Consent has been obtained for this service by 1 care team member: yes.    Visit Summary  The purpose of today s appointment was to continue our conversation regarding Ms. De La Rosa's recent neuropsychological test results (evaluation completed on 10/18/2021). We started our conversation on Monday, but it was cut short when Ms. De La Rosa's cell phone battery was depleting. Today, we continued talking about my opinion on what is causing or contributing to her cognitive decline. We discussed my recommendations at length, and Ms. De La Rosa was very open to the idea of scheduling out a time at least once per week in which she does something for herself (whether it is getting together with a friend, a group of friends, or spending time alone doing something relaxing or fun). She feels comfortable asking her daughters to schedule this weekly respite time with her. I provided the opportunity for Ms. De La Rosa to ask any questions that she had about the evaluation. At the end of the session, she indicated that she understood the results and that I had answered all of her questions.       Lena Mike PsyD, LP  Licensed Clinical Neuropsychologist  [facility]  [address], [address]  [place], [place] " [address]  Phone: [phone]      Telephone Visit Details    Type of service:  Telephone Visit  Start Time: 2:30 PM  End Time: 3:04 PM    Originating Location (pt. Location): Home    Distant Location (provider location):  [facility]    Mode of Communication:  Telephone    Leonardo De La Rosa was evaluated via a billable telephone visit. For diagnostic and coding purposes, Ms. 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 All previous charges for the entire Episode of Care were filed on Monday, 10/25. Today's visit includes an additional 1 unit of [id number].

## 2021-10-27 NOTE — LETTER
"    10/27/2021         RE: Leonardo De La Rosa  [address] [address]        Dear Colleague,    Thank you for referring your patient, Leonardo De La Rosa, to the [facility]. Please see a copy of my visit note below.    NEUROPSYCHOLOGY TELE-HEALTH FEEDBACK VISIT  [facility]    Telephone Visit  Leonardo De La Rosa is a 75 year old female who is being evaluated via a billable telephone visit.     The patient has been notified of the following:      \"This telephone visit will be conducted via a call between you and your provider. We have found that certain health care needs can be provided without the need for a physical exam.  This service lets us provide the care you need with a phone conversation. If during the course of the call the provider feels a telephone visit is not appropriate, you will not be charged for this service.\"     Patient has given verbal consent to a Telephone visit? Yes  Consent has been obtained for this service by 1 care team member: yes.    Visit Summary  The purpose of today s appointment was to continue our conversation regarding Ms. De La Rosa's recent neuropsychological test results (evaluation completed on 10/18/2021). We started our conversation on Monday, but it was cut short when Ms. De La Rosa's cell phone battery was depleting. Today, we continued talking about my opinion on what is causing or contributing to her cognitive decline. We discussed my recommendations at length, and Ms. De La Rosa was very open to the idea of scheduling out a time at least once per week in which she does something for herself (whether it is getting together with a friend, a group of friends, or spending time alone doing something relaxing or fun). She feels comfortable asking her daughters to schedule this weekly respite time with her. I provided the opportunity for Ms. De La Rosa to ask any questions that she had about the evaluation. At the end of the " session, she indicated that she understood the results and that I had answered all of her questions.       Lena Mike PsyD, LP  Licensed Clinical Neuropsychologist  [facility]  [address], [address]  [place] [address]  Phone: [phone]      Telephone Visit Details    Type of service:  Telephone Visit  Start Time: 2:30 PM  End Time: 3:04 PM    Originating Location (pt. Location): Home    Distant Location (provider location):  [facility]    Mode of Communication:  Telephone    Leonardo De La Rosa was evaluated via a billable telephone visit. For diagnostic and coding purposes, Ms. 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 All previous charges for the entire Episode of Care were filed on Monday, 10/25. Today's visit includes an additional 1 unit of [id number].       Again, thank you for allowing me to participate in the care of your patient.        Sincerely,        Sharda Kelley.LIZZ, LP

## 2021-10-28 ENCOUNTER — TELEPHONE (OUTPATIENT)
Dept: NEUROLOGY | Facility: CLINIC | Age: 76
End: 2021-10-28

## 2021-10-28 NOTE — TELEPHONE ENCOUNTER
Kurt informed that scheduling will connect with her to schedule (needs to clear for insurance first). I have asked Kurt to contact me next week if she doesn't hear from them  Leonora Martinez CMA on 10/28/2021 at 3:49 PM

## 2021-11-08 ENCOUNTER — TELEPHONE (OUTPATIENT)
Dept: NEUROLOGY | Facility: CLINIC | Age: 76
End: 2021-11-08
Payer: MEDICARE

## 2021-11-08 DIAGNOSIS — G44.211 INTRACTABLE EPISODIC TENSION-TYPE HEADACHE: Primary | ICD-10-CM

## 2021-11-08 NOTE — TELEPHONE ENCOUNTER
Pt's daughter called to ask about when and where the PET scan is to be done. She also asked if pt should be seen, as she is having severe right sided head pain. Pt's knee surgery is on 11-15-21

## 2021-11-08 NOTE — TELEPHONE ENCOUNTER
This likely is chronic tension type headache.  However check sed rate and C-reactive protein.  See orders.  Use over-the-counter ibuprofen 400 mg every 8 hours if needed.  Further recommendation after lab work is done

## 2021-11-08 NOTE — TELEPHONE ENCOUNTER
Kurt states that she has had a throbbing headache over her right ear for about 3-4 weeks now. Tylenol helps relieve the pain somewhat but the headache comes back. She thinks it may be weather related but wanted to check with you. When she last saw you, she had this head pain but said you didn't seem too concerned about it. I also spoke with Naila who also mentions increased confusion with this head pain. Suggestions?  Leonora Martinez CMA on 11/8/2021 at 1:00 PM

## 2021-11-12 ENCOUNTER — HOSPITAL ENCOUNTER (OUTPATIENT)
Dept: PET IMAGING | Facility: HOSPITAL | Age: 76
Discharge: HOME | End: 2021-11-12
Attending: PSYCHIATRY & NEUROLOGY | Admitting: PSYCHIATRY & NEUROLOGY
Payer: MEDICARE

## 2021-11-12 DIAGNOSIS — F05 SENILE DEMENTIA WITH DELIRIUM WITHOUT BEHAVIORAL DISTURBANCE (H): ICD-10-CM

## 2021-11-12 DIAGNOSIS — F03.90 SENILE DEMENTIA WITH DELIRIUM WITHOUT BEHAVIORAL DISTURBANCE (H): ICD-10-CM

## 2021-11-12 DIAGNOSIS — F03.90 PRESENILE DEMENTIA, UNCOMPLICATED (H): ICD-10-CM

## 2021-11-12 LAB — GLUCOSE BLDC GLUCOMTR-MCNC: 79 MG/DL (ref 70–99)

## 2021-11-12 PROCEDURE — 78608 BRAIN IMAGING (PET): CPT | Mod: 26

## 2021-11-12 PROCEDURE — G1004 CDSM NDSC: HCPCS | Mod: GC

## 2021-11-12 PROCEDURE — A9552 F18 FDG: HCPCS | Performed by: PSYCHIATRY & NEUROLOGY

## 2021-11-12 PROCEDURE — 343N000001 HC RX 343: Performed by: PSYCHIATRY & NEUROLOGY

## 2021-11-12 PROCEDURE — 82962 GLUCOSE BLOOD TEST: CPT

## 2021-11-12 PROCEDURE — 78608 BRAIN IMAGING (PET): CPT | Mod: ME,PI

## 2021-11-12 RX ADMIN — FLUDEOXYGLUCOSE F-18 9.41 MCI.: 500 INJECTION, SOLUTION INTRAVENOUS at 13:04

## 2021-11-14 NOTE — RESULT ENCOUNTER NOTE
I have sent a message to patient explaining the PET scan suggests early Alzheimer Dementia. Can we add her on to my schedule at end of the day on Tuesday to discuss it in more detail and consider Aricept. Thanks

## 2021-11-15 ENCOUNTER — TELEPHONE (OUTPATIENT)
Dept: NEUROLOGY | Facility: CLINIC | Age: 76
End: 2021-11-15
Payer: MEDICARE

## 2021-11-15 NOTE — TELEPHONE ENCOUNTER
Naila called regarding her mom mentioned something about seeing  tomorrow Tuesday 11/16, but I don't see anything about this and being worked in for a follow up. Please call Naila back at [phone]

## 2021-11-16 ENCOUNTER — OFFICE VISIT (OUTPATIENT)
Dept: NEUROLOGY | Facility: CLINIC | Age: 76
End: 2021-11-16
Payer: MEDICARE

## 2021-11-16 VITALS — SYSTOLIC BLOOD PRESSURE: 169 MMHG | HEART RATE: 78 BPM | DIASTOLIC BLOOD PRESSURE: 89 MMHG

## 2021-11-16 DIAGNOSIS — M47.22 CERVICAL RADICULOPATHY DUE TO DEGENERATIVE JOINT DISEASE OF SPINE: Primary | ICD-10-CM

## 2021-11-16 DIAGNOSIS — G30.1 LATE ONSET ALZHEIMER'S DEMENTIA WITHOUT BEHAVIORAL DISTURBANCE (H): ICD-10-CM

## 2021-11-16 DIAGNOSIS — F02.80 LATE ONSET ALZHEIMER'S DEMENTIA WITHOUT BEHAVIORAL DISTURBANCE (H): ICD-10-CM

## 2021-11-16 PROBLEM — I35.0 MILD AORTIC STENOSIS: Status: ACTIVE | Noted: 2021-11-12

## 2021-11-16 PROCEDURE — 99214 OFFICE O/P EST MOD 30 MIN: CPT | Performed by: PSYCHIATRY & NEUROLOGY

## 2021-11-16 RX ORDER — DONEPEZIL HYDROCHLORIDE 5 MG/1
5 TABLET, FILM COATED ORAL AT BEDTIME
Qty: 30 TABLET | Refills: 3 | Status: SHIPPED | OUTPATIENT
Start: 2021-11-16 | End: 2022-03-11

## 2021-11-16 NOTE — LETTER
2021         RE: Leonardo De La Rosa  [address] [facility] [address]        Dear Colleague,    Thank you for referring your patient, Leonardo De La Rosa, to the [facility] [place]. Please see a copy of my visit note below.    NEUROLOGY FOLLOW UP VISIT  NOTE       [facility] [place]  [address] [place] [address]  Tel: [phone]  Fax: [phone]  [email or web address]     Leonardo De La Rosa,  1945, MRN [id number]  PCP: Cassie Ortega  Date: 2021      ASSESSMENT & PLAN     Visit Diagnosis  1. Late onset Alzheimer's dementia without behavioral disturbance (H)  2. Cervical radiculopathy due to degenerative joint disease of spine       Late onset Alzheimer's dementia without behavioral disturbance  Patient is a 75-year-old female with history of SNHL, osteoporosis, GERD who is being followed in our clinic for progressive cognitive decline.  She had extensive work-up twice for her cognitive issues that included MRI brain, MRA, neuropsychological testing and lab work that were normal.  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  Recent repeat work-up included normal MRI and repeat lab work and as initial neuropsychological testing was done virtually repeat neuropsychological testing was done in person that suggested a mild unspecified neurocognitive disorder with adjustment disorder and anxiety.  Brain PET scan was performed that showed hypometabolism in the parietal lobe (right greater than left) and lesser loss in frontal region.  Such pattern can be seen in early Alzheimer's dementia.  After explaining all the side effect and discussing with family members, I started her on Aricept 5 mg daily.  After 2 months I will increase it to 10 mg daily.  She was counseled about the diagnosis    Cervical radiculopathy  Patient is complaining of neck  pain that at times radiates to the right side of the head.  She recently had lab work done that includes normal sed rate but elevated C-reactive protein.  She has arthritis and this nonspecific elevation likely is due to her arthritis but I have recommended repeating a C-reactive protein, antinuclear antibody and rheumatoid factor.  Additionally I am ordering CT of the cervical spine.    Frequent Falls  Patient was seen at [facility] and had MRI of cervical spine, EMG and MRI lumbar spine that did not show any high-grade stenosis.  They felt her weakness was related to hip flexor involvement due to spinal canal stenosis and was sent for physical therapy.  She is still not using cane and has fallen down.  She claims she is about to get right knee replacement and won't have any options after that as she will need to use cane.    Thank you again for this referral, please feel free to contact me if you have any questions.    Augustin Terry MD  [facility]  (Former[facility], [facility]     HISTORY OF PRESENT ILLNESS     Patient is a 75-year-old female with history of sensorineural hearing loss, osteoporosis, GERD last seen on April 12, 2021 who returns for follow-up for her cognitive issues.  Family was concerned about progressive cognitive decline and frequent fall at times not remembering events leading up to her fall.  Daughters were concerned that she was having memory difficulty and tends to get confused easily and fell down not remembering how the fall occurred.  She is under a lot of stress as she is taking care of her  who also has dementia & H/O intracranial hemorrhage that is taking a huge emotional toll on .  According to family members her  is  high maintenance due to cultural  issues and couple is having difficulty managing their life in [place] in the later part of of their lives.      Work-up in the past included MRI of the brain and MRA that  was normal.  Lab work included normal vitamin B12, TSH, vitamin B1, MMA, homocystine, folic acid and alpha-tocopherol.  Although she scored 23/30 on [place] cognitive assessment her neuropsychological testing did not find any cognitive disorder and it was felt patient had adjustment disorder with anxiety.  She was on Cymbalta and previously was confused about the dose of Cymbalta that she was taking.  They also saw a neurologist at [facility] for a second opinion and  had MRI and EMG and was referred to [facility].  Daughter is concerned that her gait and cognition has declined significantly.  She has fallen on few times and was referred to physical therapy   They had recommended a cane but patient is embarrassed to use it.      In view of progressive cognitive decline she had a repeat MRI that did not show any acute abnormality.  Lab work included normal vitamin B1, B12, methylmalonic acid level, folic acid and homocysteine.  Repeat neuropsychological testing showed mild unspecified neurocognitive disorder with adjustment disorder and anxiety.  A PET scan was performed that showed FDG metabolic pattern of the brain demonstrating hypometabolism in the parietal lobes (the right greater than left, and lesser loss in the frontal region.  This pattern can be seen in early Alzheimer's dementia.  After patient saw the report on my chart she was quite upset and was seen on urgent basis.  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    Surgical History:  She  has a past surgical history that includes TOTAL KNEE ARTHROPLASTY; Foot surgery; Hysterectomy total abdominal, bilateral salpingo-oophorectomy, combined; Arthrodesis foot (4/22/2013); Bunionectomy lapidus with tarsal metatarsal (TMT) fusion (4/22/2013); Hysterectomy; knee surgery; and Foot surgery.     SOCIAL HISTORY     Reviewed, and she  reports that she has never smoked. She has never used smokeless tobacco. She reports that she does not drink alcohol and does not use drugs.     FAMILY HISTORY     Reviewed, and family history includes Coronary Artery Disease in her mother.     ALLERGIES     Allergies   Allergen Reactions     Acetaminophen      Estratest H.S. Other (See Comments) and Unknown     Other reaction(s): Other (see comments)  Increase lipids  Outside source did not list reaction  Increase lipids       Hydrocodone      Oxycodone      Propofol Nausea and Vomiting     Outside source states \"injectable and inhaled\"  Outside source states \"injectable and inhaled\"  Outside source states \"injectable and inhaled\"  Outside source states \"injectable and inhaled\"       Reglan      Estrogens Rash     Other reaction(s): Other (see comments)  Outside source did not list reaction  Estrogen patches, from patch only, ( Adhesive )  does tolerate estrogen other forms        Penicillins Nausea and Vomiting and Nausea     Other reaction(s): GI intolerance, GI Upset  Patient states upset stomach.  Patient states upset stomach.  Patient states upset stomach.  Patient states upset stomach.           REVIEW OF SYSTEMS     A 12 point review of system was performed and was negative except as outlined in the history of present illness.     HOME MEDICATIONS     Current Outpatient Rx   Medication Sig " Dispense Refill     acetaminophen (TYLENOL) 325 MG tablet Take 3 tablets by mouth every 8 hours. 100 tablet 0     calcium carbonate-vitamin D (CALCIUM + D) 600-200 MG-UNIT TABS Take 1 tablet by mouth daily.       Celecoxib (CELEBREX PO) Take 200 mg by mouth daily.       Chlorpheniramine-Phenylephrine 4-10 MG TABS        Cholecalciferol (VITAMIN D3 PO) Take 1,000 Units by mouth daily.       denosumab (PROLIA) 60 MG/ML SOSY injection Inject 60 mg Subcutaneous once       donepezil (ARICEPT) 5 MG tablet Take 1 tablet (5 mg) by mouth At Bedtime 30 tablet 3     DULoxetine (CYMBALTA) 60 MG capsule Take 60 mg by mouth daily        famotidine (PEPCID) 40 MG tablet TAKE 1 TABLET BY MOUTH TWICE A DAY       fluocin-hydroquinone-tretinoin (TRI-RODNEY) 0.01-4-0.05 % CREA Externally apply topically At Bedtime 30 g 3     fluticasone (FLONASE) 50 MCG/ACT nasal spray INSTILL 1 SPRAY INTO EACH NOSTRIL ONCE DAILY AS NEEDED FOR RHINITIS       glucosamine-chondroitin 500-400 MG CAPS Take 1 capsule by mouth daily.       losartan (COZAAR) 100 MG tablet Take 100 mg by mouth       methocarbamol (ROBAXIN) 500 MG tablet TAKE 1 TABLET BY MOUTH 3 TIMES A DAY       multivitamin, therapeutic with minerals (MULTI-VITAMIN) TABS Take 1 tablet by mouth daily.       naproxen sodium 220 MG capsule Take 220 mg by mouth 2 times daily (with meals)       Omega-3 Fatty Acids (OMEGA-3 FISH OIL PO) Take 1,200 g by mouth every 7 days        OMEPRAZOLE PO        pravastatin (PRAVACHOL) 40 MG tablet Take 40 mg by mouth       pregabalin (LYRICA) 100 MG capsule Take 100 mg by mouth 2 times daily        zolpidem (AMBIEN) 5 MG tablet Take 5 mg by mouth           PHYSICAL EXAM     Vital signs  BP (!) 169/89 (BP Location: Right arm, Patient Position: Sitting)   Pulse 78     Weight:   0 lbs 0 oz    Patient is alert and oriented x4 in no acute distress. Vital signs were reviewed and are documented in electronic medical record. Neck was supple, no carotid bruits,  thyromegaly, JVD, or lymphadenopathy was noted.   NEUROLOGY EXAM:    Patient s speech was normal with no aphasia or dysarthria.  She appears somewhat confused    Funduscopic exam was normal, with normal cup to disc ratio. Cranial nerves II -XII were intact.  Except she is hard of hearing    Patient had normal mass, tone with 5/5 strength in the upper extremity proximally in the lower extremity 4+/5 distally 5/5    Sensation was intact to light touch, pinprick, and vibratory sensation.     Reflexes were 0 symmetrical with downgoing toes.     No dysmetria noted on FNF or HKS.     She has a wide-based gait and has trouble tandem walking Romberg negative     DIAGNOSTIC STUDIES     PERTINENT RADIOLOGY  Following imaging studies were reviewed:     CT BRAIN 12/17/2020  1.  No acute intracranial process.  2.  Minimal chronic small vessel ischemic disease.     CT LUMBAR SPINE 9/23/2020  1. New but subacute to chronic appearing fractures of L2, including mild  superior endplate compression and incompletely healed bilateral pedicle  fractures, which on the left extend into the posterosuperior vertebral body.  2. Postoperative changes including anterior and posterior instrumented fusion at  L5-S1. No hardware failure. No advanced spinal canal or neural foraminal  narrowing at any level.     MRI BRAIN 9/13/2021  1. No evidence of acute intracranial hemorrhage, mass effect, or infarction.  2. Mild nonspecific white matter changes.  3. Mild brain parenchymal volume loss.    MRI CERVICAL SPINE 7/12/2021  1.  Mild central canal and left foraminal narrowing at C6-7  2.  Mild degenerative changes within the remainder of the cervical spine as above    MRI, MRA BRAIN 2/26/2021  HEAD MRI:   1.  Mild chronic small vessel ischemic changes and age-related changes.  2.  Otherwise normal brain MRI.    HEAD MRA:   1.  Normal MRA [place].    NECK MRA:  1.  Minimal plaque in the right carotid bulb, but no significant associated stenosis  by NASCET criteria.  2.   Otherwise normal neck MRA.     MRI LUMBAR SPINE 9/23/2020  No new spondylolisthesis since 05/04/2020. Interval subacute  appearing superior L2 compression fracture and bilateral pedicle fractures.  Stable chronic healing fracture through the posterolateral left L3 vertebral  body and pedicle.     BRAIN PET 11/12/2021  FDG metabolic pattern of the brain demonstrates  hypometabolism in the parietal lobes (right greater then left) and  lesser loss in frontal, this pattern can be seen with early  Alzheimer's disease.    EMG 7/13/2021  1.  This is a normal electrodiagnostic study of bilateral lower extremities  2.  Specifically there is no electrodiagnostic evidence of a lumbar radiculopathy, plexopathy or peripheral apathy    NEUROPSYCHOLOGICAL EVALUATION 10/18/2021  DIAGNOSIS:  Unspecified Mild Neurocognitive Disorder     Adjustment Disorder with Anxiety      RECOMMENDATIONS:  1) Ongoing neurologic care and monitoring is recommended.      2) If not medically contraindicated, Ms. De La Rosa may benefit from an empirical trial of a cognitive-enhancing medication.      3) Consider a PET scan to help clarify etiology of Ms. De La Rosa's cognitive decline.     4) Consider a referral to Medication Therapy Management (pharmacist) for a review of Ms. De La Rosa's current regimen and to provide recommendations in order to minimize any unwanted side effects.     5) Additional caregiving support for Ms. De La Rosa is crucial. There are several cultural factors at play, but I would highly encourage Ms. De La Rosa to consider formal in-home care giving services to assist her so that she can have some respite. Some other ideas for increased support include hiring a caregiver to come at night so that she can sleep, hiring a cook who is familiar at preparing [place] cuisine, or even having regularly scheduled times of respite when her daughters can attend to their dad so she can engage in her own self-care activities (e.g., every  Saturday and Sunday from 12-5 PM).     6) Although the patient has declined this in the past, I would encourage her to consider establishing care with a psychotherapist for additional support. If she prefers a provider who is more familiar with her culture, there are a couple of options that I found in the community:    Jenny Mak of [facility] in [place] ([phone])    Fran Gutierrez of [employer] in [facility] ([phone])     7) Given her mild memory issues, occasional oversight is encouraged with more complex and/or novel tasks to ensure accuracy and safety (e.g., medication and financial management). She should allow herself extra time to complete these types of tasks as well.     8) The patient is encouraged to utilize cognitive compensatory strategies in daily life, including utilizing note pads, checklists, to-do lists, a calendar/planner, labeled alarm reminders, a GPS, a pillbox, and maintaining a daily morning and nighttime routine and an organized living/work environment.     9) Ms. De La Rosa is strongly encouraged to adhere closely to her medication regimen to help keep her cerebrovascular risk factors (e.g., hyperlipidemia) and other medical conditions well controlled. This may help prevent further exacerbations in cognitive decline in the future.     10) Neuropsychological follow-up is recommended in 12-18 months (or as clinically indicated) in order to monitor her cognitive status, help to clarify etiology, and update recommendations. The current test data can be used as a baseline to which future comparisons can be made.    NEUROPSYCHOLOGICAL EVALUATION 3/25/2021  No Cognitive Disorder     Adjustment Disorder with Anxiety     RECOMMENDATIONS:  1) Stress management strategies are strongly encouraged, as improvement in emotional distress will lead to perceived improvements in her cognitive functioning (and even her physical pain level to some  degree) over time. Such strategies may include:  ? Participating in individual psychotherapy. If the patient is amenable, I will refer her to our psychologist (Dr. Flavia Rivera). I will have our schedulers call her to set up an appointment, or she can always call our clinic at a later time to establish care ([phone]).  ? Consider an adjustment to her medications for her mood/anxiety. She experienced side effects with increased dosing of Cymbalta; as such, consider an alternative medication for anxiety (e.g., a selective serotonin reuptake inhibitor). This may help with her daytime anxiety as well as the heightened anxiety she experiences at night (she fears her sleep medications will make her too sedated to respond to her 's needs, but her anxiety about this keeps her from sleeping and increases her hypervigilance at night as well). This recommendation will be deferred to her PCP.  ? Utilizing relaxation practices. I will mail her a handout with some techniques she may try on her own.  ? Attending caregiving support groups. She may find these through the Alzheimer's Association website or hotline (https://www.alz.org/ or by calling 522-430-9534 ) or through the INCIDE Line ([place]://[place].gov/[employer]-line/ or by calling 454-852-4379).     2) In addition, a referral to a  or  or calling the INCIDE Line may be helpful to assist her in seeking out respite services or formal in-home care giving services to allow Ms. De La Rosa to take a break or re-engage in her hobbies and interests.     3) Ms. De La Rosa is also encouraged to follow up with her medical providers for ongoing pain management. Improvements in her pain may help her stress level and cognitive functioning to some degree.     4) Ms. De La Rosa should adhere closely to her medication regimen in order to manage her cerebrovascular risk factors (hyperlipidemia), pain, and mood. This may help to prevent future cognitive  decline.     5) The patient is encouraged to remain physically, socially, and mentally active for optimal brain health. This may also help to improve her emotional wellbeing.     6) From a neurocognitive standpoint, I have no concerns about the patient's ability to independently perform IADLs or care for her .      7) Cognitive strategies may be helpful for her own reassurance. These may include utilizing note pads, checklists, to-do lists, alarm reminders, a detailed calendar/planner, a GPS, a pillbox, and maintaining a daily morning and nighttime routine and an organized living environment. She should avoid multi-tasking when possible and should attempt to reduce as much distraction as possible while performing complex or important tasks.     8) Neuropsychological follow-up is not warranted at this time. However, the current test data can now serve as a baseline should a repeat assessment be indicated in the future     PERTINENT LABS  Following labs were reviewed:  Admission on 08/16/2021, Discharged on 08/16/2021   Component Date Value     Color Urine 08/16/2021 Colorless      Appearance Urine 08/16/2021 Clear      Glucose Urine 08/16/2021 Negative      Bilirubin Urine 08/16/2021 Negative      Ketones Urine 08/16/2021 Negative      Specific Gravity Urine 08/16/2021 1.008      Blood Urine 08/16/2021 Negative      pH Urine 08/16/2021 6.0      Protein Albumin Urine 08/16/2021 Negative      Urobilinogen Urine 08/16/2021 <2.0      Nitrite Urine 08/16/2021 Negative      Leukocyte Esterase Urine 08/16/2021 Negative      Mucus Urine 08/16/2021 Present*     RBC Urine 08/16/2021 <1      WBC Urine 08/16/2021 <1        Ref Range & Units 2 wk ago   VITAMIN B12 180 - 914 pg/mL 808      Ref Range & Units 2 wk ago    VITAMIN B1 WHL BLD 66.5 - 200.0 nmol/L 116.7       Ref Range & Units 2 wk ago   Methylmalonic Acid 0 - 378 nmol/L 318       Ref Range & Units 2 wk ago   HOMOCYSTEINE,TOTAL 5.0 - 15.4 umol/L 12.1      Ref  Range & Units 2 wk ago   FOLIC ACID >=4.0 ng/mL >20.0        Total time spent for face to face visit, reviewing labs/imaging studies, counseling and coordination of care was: 30 Minutes spent on the date of the encounter doing chart review, review of outside records, review of test results, interpretation of tests, patient visit, documentation and discussion with family       This note was dictated using voice recognition software.  Any grammatical or context distortions are unintentional and inherent to the software.    Orders Placed This Encounter   Procedures     CT Cervical Spine w/o Contrast     EVA w/Reflex ([facility])     Rheumatoid factor     C-Reactive Protein  Quant ([facility])      New Prescriptions    DONEPEZIL (ARICEPT) 5 MG TABLET    Take 1 tablet (5 mg) by mouth At Bedtime     Modified Medications    No medications on file               Again, thank you for allowing me to participate in the care of your patient.        Sincerely,        Augustin Terry MD

## 2021-11-16 NOTE — PROGRESS NOTES
NEUROLOGY FOLLOW UP VISIT  NOTE       [facility] NEUROLOGY [place]  [address] [place] [address]  Tel: [phone]  Fax: [phone]  [email or web address]     Leonardo De La Rosa,  1945, MRN [id number]  PCP: Cassie Ortega  Date: 2021      ASSESSMENT & PLAN     Visit Diagnosis  Late onset Alzheimer's dementia without behavioral disturbance (H)  Cervical radiculopathy due to degenerative joint disease of spine       Late onset Alzheimer's dementia without behavioral disturbance  Patient is a 75-year-old female with history of SNHL, osteoporosis, GERD who is being followed in our clinic for progressive cognitive decline.  She had extensive work-up twice for her cognitive issues that included MRI brain, MRA, neuropsychological testing and lab work that were normal.  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  Recent repeat work-up included normal MRI and repeat lab work and as initial neuropsychological testing was done virtually repeat neuropsychological testing was done in person that suggested a mild unspecified neurocognitive disorder with adjustment disorder and anxiety.  Brain PET scan was performed that showed hypometabolism in the parietal lobe (right greater than left) and lesser loss in frontal region.  Such pattern can be seen in early Alzheimer's dementia.  After explaining all the side effect and discussing with family members, I started her on Aricept 5 mg daily.  After 2 months I will increase it to 10 mg daily.  She was counseled about the diagnosis    Cervical radiculopathy  Patient is complaining of neck pain that at times radiates to the right side of the head.  She recently had lab work done that includes normal sed rate but elevated C-reactive protein.  She has arthritis and this nonspecific elevation likely is due to her arthritis but I have recommended  repeating a C-reactive protein, antinuclear antibody and rheumatoid factor.  Additionally I am ordering CT of the cervical spine.    Frequent Falls  Patient was seen at [facility] and had MRI of cervical spine, EMG and MRI lumbar spine that did not show any high-grade stenosis.  They felt her weakness was related to hip flexor involvement due to spinal canal stenosis and was sent for physical therapy.  She is still not using cane and has fallen down.  She claims she is about to get right knee replacement and won't have any options after that as she will need to use cane.    Thank you again for this referral, please feel free to contact me if you have any questions.    Augustin Terry MD  [facility]  (Formerly, [facility]     HISTORY OF PRESENT ILLNESS     Patient is a 75-year-old female with history of sensorineural hearing loss, osteoporosis, GERD last seen on April 12, 2021 who returns for follow-up for her cognitive issues.  Family was concerned about progressive cognitive decline and frequent fall at times not remembering events leading up to her fall.  Daughters were concerned that she was having memory difficulty and tends to get confused easily and fell down not remembering how the fall occurred.  She is under a lot of stress as she is taking care of her  who also has dementia & H/O intracranial hemorrhage that is taking a huge emotional toll on .  According to family members her  is  high maintenance due to cultural  issues and couple is having difficulty managing their life in [place] in the later part of of their lives.      Work-up in the past included MRI of the brain and MRA that was normal.  Lab work included normal vitamin B12, TSH, vitamin B1, MMA, homocystine, folic acid and alpha-tocopherol.  Although she scored 23/30 on [place] cognitive assessment her neuropsychological testing did not find any cognitive disorder and it was  felt patient had adjustment disorder with anxiety.  She was on Cymbalta and previously was confused about the dose of Cymbalta that she was taking.  They also saw a neurologist at [facility] for a second opinion and  had MRI and EMG and was referred to [facility].  Daughter is concerned that her gait and cognition has declined significantly.  She has fallen on few times and was referred to physical therapy   They had recommended a cane but patient is embarrassed to use it.      In view of progressive cognitive decline she had a repeat MRI that did not show any acute abnormality.  Lab work included normal vitamin B1, B12, methylmalonic acid level, folic acid and homocysteine.  Repeat neuropsychological testing showed mild unspecified neurocognitive disorder with adjustment disorder and anxiety.  A PET scan was performed that showed FDG metabolic pattern of the brain demonstrating hypometabolism in the parietal lobes (the right greater than left, and lesser loss in the frontal region.  This pattern can be seen in early Alzheimer's dementia.  After patient saw the report on my chart she was quite upset and was seen on urgent basis.  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    Surgical History:  She  has a past surgical  "history that includes TOTAL KNEE ARTHROPLASTY; Foot surgery; Hysterectomy total abdominal, bilateral salpingo-oophorectomy, combined; Arthrodesis foot (4/22/2013); Bunionectomy lapidus with tarsal metatarsal (TMT) fusion (4/22/2013); Hysterectomy; knee surgery; and Foot surgery.     SOCIAL HISTORY     Reviewed, and she  reports that she has never smoked. She has never used smokeless tobacco. She reports that she does not drink alcohol and does not use drugs.     FAMILY HISTORY     Reviewed, and family history includes Coronary Artery Disease in her mother.     ALLERGIES     Allergies   Allergen Reactions     Acetaminophen      Estratest H.S. Other (See Comments) and Unknown     Other reaction(s): Other (see comments)  Increase lipids  Outside source did not list reaction  Increase lipids       Hydrocodone      Oxycodone      Propofol Nausea and Vomiting     Outside source states \"injectable and inhaled\"  Outside source states \"injectable and inhaled\"  Outside source states \"injectable and inhaled\"  Outside source states \"injectable and inhaled\"       Reglan      Estrogens Rash     Other reaction(s): Other (see comments)  Outside source did not list reaction  Estrogen patches, from patch only, ( Adhesive )  does tolerate estrogen other forms        Penicillins Nausea and Vomiting and Nausea     Other reaction(s): GI intolerance, GI Upset  Patient states upset stomach.  Patient states upset stomach.  Patient states upset stomach.  Patient states upset stomach.           REVIEW OF SYSTEMS     A 12 point review of system was performed and was negative except as outlined in the history of present illness.     HOME MEDICATIONS     Current Outpatient Rx   Medication Sig Dispense Refill     acetaminophen (TYLENOL) 325 MG tablet Take 3 tablets by mouth every 8 hours. 100 tablet 0     calcium carbonate-vitamin D (CALCIUM + D) 600-200 MG-UNIT TABS Take 1 tablet by mouth daily.       Celecoxib (CELEBREX PO) Take 200 mg by mouth " daily.       Chlorpheniramine-Phenylephrine 4-10 MG TABS        Cholecalciferol (VITAMIN D3 PO) Take 1,000 Units by mouth daily.       denosumab (PROLIA) 60 MG/ML SOSY injection Inject 60 mg Subcutaneous once       donepezil (ARICEPT) 5 MG tablet Take 1 tablet (5 mg) by mouth At Bedtime 30 tablet 3     DULoxetine (CYMBALTA) 60 MG capsule Take 60 mg by mouth daily        famotidine (PEPCID) 40 MG tablet TAKE 1 TABLET BY MOUTH TWICE A DAY       fluocin-hydroquinone-tretinoin (TRI-RODNEY) 0.01-4-0.05 % CREA Externally apply topically At Bedtime 30 g 3     fluticasone (FLONASE) 50 MCG/ACT nasal spray INSTILL 1 SPRAY INTO EACH NOSTRIL ONCE DAILY AS NEEDED FOR RHINITIS       glucosamine-chondroitin 500-400 MG CAPS Take 1 capsule by mouth daily.       losartan (COZAAR) 100 MG tablet Take 100 mg by mouth       methocarbamol (ROBAXIN) 500 MG tablet TAKE 1 TABLET BY MOUTH 3 TIMES A DAY       multivitamin, therapeutic with minerals (MULTI-VITAMIN) TABS Take 1 tablet by mouth daily.       naproxen sodium 220 MG capsule Take 220 mg by mouth 2 times daily (with meals)       Omega-3 Fatty Acids (OMEGA-3 FISH OIL PO) Take 1,200 g by mouth every 7 days        OMEPRAZOLE PO        pravastatin (PRAVACHOL) 40 MG tablet Take 40 mg by mouth       pregabalin (LYRICA) 100 MG capsule Take 100 mg by mouth 2 times daily        zolpidem (AMBIEN) 5 MG tablet Take 5 mg by mouth           PHYSICAL EXAM     Vital signs  BP (!) 169/89 (BP Location: Right arm, Patient Position: Sitting)   Pulse 78     Weight:   0 lbs 0 oz    Patient is alert and oriented x4 in no acute distress. Vital signs were reviewed and are documented in electronic medical record. Neck was supple, no carotid bruits, thyromegaly, JVD, or lymphadenopathy was noted.   NEUROLOGY EXAM:    Patient s speech was normal with no aphasia or dysarthria.  She appears somewhat confused    Funduscopic exam was normal, with normal cup to disc ratio. Cranial nerves II -XII were intact.  Except  she is hard of hearing    Patient had normal mass, tone with 5/5 strength in the upper extremity proximally in the lower extremity 4+/5 distally 5/5    Sensation was intact to light touch, pinprick, and vibratory sensation.     Reflexes were 0 symmetrical with downgoing toes.     No dysmetria noted on FNF or HKS.     She has a wide-based gait and has trouble tandem walking Romberg negative     DIAGNOSTIC STUDIES     PERTINENT RADIOLOGY  Following imaging studies were reviewed:     CT BRAIN 12/17/2020  1.  No acute intracranial process.  2.  Minimal chronic small vessel ischemic disease.     CT LUMBAR SPINE 9/23/2020  1. New but subacute to chronic appearing fractures of L2, including mild  superior endplate compression and incompletely healed bilateral pedicle  fractures, which on the left extend into the posterosuperior vertebral body.  2. Postoperative changes including anterior and posterior instrumented fusion at  L5-S1. No hardware failure. No advanced spinal canal or neural foraminal  narrowing at any level.     MRI BRAIN 9/13/2021  1. No evidence of acute intracranial hemorrhage, mass effect, or infarction.  2. Mild nonspecific white matter changes.  3. Mild brain parenchymal volume loss.    MRI CERVICAL SPINE 7/12/2021  1.  Mild central canal and left foraminal narrowing at C6-7  2.  Mild degenerative changes within the remainder of the cervical spine as above    MRI, MRA BRAIN 2/26/2021  HEAD MRI:   1.  Mild chronic small vessel ischemic changes and age-related changes.  2.  Otherwise normal brain MRI.    HEAD MRA:   1.  Normal MRA [employer] [place].    NECK MRA:  1.  Minimal plaque in the right carotid bulb, but no significant associated stenosis by NASCET criteria.  2.   Otherwise normal neck MRA.     MRI LUMBAR SPINE 9/23/2020  No new spondylolisthesis since 05/04/2020. Interval subacute  appearing superior L2 compression fracture and bilateral pedicle fractures.  Stable chronic healing fracture through  the posterolateral left L3 vertebral  body and pedicle.     BRAIN PET 11/12/2021  FDG metabolic pattern of the brain demonstrates  hypometabolism in the parietal lobes (right greater then left) and  lesser loss in frontal, this pattern can be seen with early  Alzheimer's disease.    EMG 7/13/2021  1.  This is a normal electrodiagnostic study of bilateral lower extremities  2.  Specifically there is no electrodiagnostic evidence of a lumbar radiculopathy, plexopathy or peripheral apathy    NEUROPSYCHOLOGICAL EVALUATION 10/18/2021  DIAGNOSIS:  Unspecified Mild Neurocognitive Disorder     Adjustment Disorder with Anxiety      RECOMMENDATIONS:  1) Ongoing neurologic care and monitoring is recommended.      2) If not medically contraindicated, Ms. De La Rosa may benefit from an empirical trial of a cognitive-enhancing medication.      3) Consider a PET scan to help clarify etiology of Ms. De La Rosa's cognitive decline.     4) Consider a referral to Medication Therapy Management (pharmacist) for a review of Ms. De La Rosa's current regimen and to provide recommendations in order to minimize any unwanted side effects.     5) Additional caregiving support for Ms. De La Rosa is crucial. There are several cultural factors at play, but I would highly encourage Ms. De La Rosa to consider formal in-home care giving services to assist her so that she can have some respite. Some other ideas for increased support include hiring a caregiver to come at night so that she can sleep, hiring a cook who is familiar at preparing  cuisine, or even having regularly scheduled times of respite when her daughters can attend to their dad so she can engage in her own self-care activities (e.g., every Saturday and Sunday from 12-5 PM).     6) Although the patient has declined this in the past, I would encourage her to consider establishing care with a psychotherapist for additional support. If she prefers a provider who is more familiar with her culture, there are  a couple of options that I found in the community:  Jenny Mak of [facility] in [place] ([phone])  Fran Gutierrez of [employer] in [facility] ([phone])     7) Given her mild memory issues, occasional oversight is encouraged with more complex and/or novel tasks to ensure accuracy and safety (e.g., medication and financial management). She should allow herself extra time to complete these types of tasks as well.     8) The patient is encouraged to utilize cognitive compensatory strategies in daily life, including utilizing note pads, checklists, to-do lists, a calendar/planner, labeled alarm reminders, a GPS, a pillbox, and maintaining a daily morning and nighttime routine and an organized living/work environment.     9) Ms. De La Rosa is strongly encouraged to adhere closely to her medication regimen to help keep her cerebrovascular risk factors (e.g., hyperlipidemia) and other medical conditions well controlled. This may help prevent further exacerbations in cognitive decline in the future.     10) Neuropsychological follow-up is recommended in 12-18 months (or as clinically indicated) in order to monitor her cognitive status, help to clarify etiology, and update recommendations. The current test data can be used as a baseline to which future comparisons can be made.    NEUROPSYCHOLOGICAL EVALUATION 3/25/2021  No Cognitive Disorder     Adjustment Disorder with Anxiety     RECOMMENDATIONS:  1) Stress management strategies are strongly encouraged, as improvement in emotional distress will lead to perceived improvements in her cognitive functioning (and even her physical pain level to some degree) over time. Such strategies may include:  Participating in individual psychotherapy. If the patient is amenable, I will refer her to our psychologist (Dr. Flavia Rivera). I will have our schedulers call her to set up an appointment, or she can always call our clinic at a  later time to establish care ([phone]).  Consider an adjustment to her medications for her mood/anxiety. She experienced side effects with increased dosing of Cymbalta; as such, consider an alternative medication for anxiety (e.g., a selective serotonin reuptake inhibitor). This may help with her daytime anxiety as well as the heightened anxiety she experiences at night (she fears her sleep medications will make her too sedated to respond to her 's needs, but her anxiety about this keeps her from sleeping and increases her hypervigilance at night as well). This recommendation will be deferred to her PCP.  Utilizing relaxation practices. I will mail her a handout with some techniques she may try on her own.  Attending caregiving support groups. She may find these through the Alzheimer's Association website or hotline (https://www.alz.org/ or by calling 894-394-9710 ) or through the [employer] (https://[place].gov/[employer].com/ or by calling 770-669-4635).     2) In addition, a referral to a  or  or calling the [employer] Line may be helpful to assist her in seeking out respite services or formal in-home care giving services to allow Ms. De La Rosa to take a break or re-engage in her hobbies and interests.     3) Ms. De La Rosa is also encouraged to follow up with her medical providers for ongoing pain management. Improvements in her pain may help her stress level and cognitive functioning to some degree.     4) Ms. De La Rosa should adhere closely to her medication regimen in order to manage her cerebrovascular risk factors (hyperlipidemia), pain, and mood. This may help to prevent future cognitive decline.     5) The patient is encouraged to remain physically, socially, and mentally active for optimal brain health. This may also help to improve her emotional wellbeing.     6) From a neurocognitive standpoint, I have no concerns about the patient's ability to independently perform  IADLs or care for her .      7) Cognitive strategies may be helpful for her own reassurance. These may include utilizing note pads, checklists, to-do lists, alarm reminders, a detailed calendar/planner, a GPS, a pillbox, and maintaining a daily morning and nighttime routine and an organized living environment. She should avoid multi-tasking when possible and should attempt to reduce as much distraction as possible while performing complex or important tasks.     8) Neuropsychological follow-up is not warranted at this time. However, the current test data can now serve as a baseline should a repeat assessment be indicated in the future     PERTINENT LABS  Following labs were reviewed:  Admission on 08/16/2021, Discharged on 08/16/2021   Component Date Value     Color Urine 08/16/2021 Colorless      Appearance Urine 08/16/2021 Clear      Glucose Urine 08/16/2021 Negative      Bilirubin Urine 08/16/2021 Negative      Ketones Urine 08/16/2021 Negative      Specific Gravity Urine 08/16/2021 1.008      Blood Urine 08/16/2021 Negative      pH Urine 08/16/2021 6.0      Protein Albumin Urine 08/16/2021 Negative      Urobilinogen Urine 08/16/2021 <2.0      Nitrite Urine 08/16/2021 Negative      Leukocyte Esterase Urine 08/16/2021 Negative      Mucus Urine 08/16/2021 Present*     RBC Urine 08/16/2021 <1      WBC Urine 08/16/2021 <1        Ref Range & Units 2 wk ago   VITAMIN B12 180 - 914 pg/mL 808      Ref Range & Units 2 wk ago    VITAMIN B1 WHL BLD 66.5 - 200.0 nmol/L 116.7       Ref Range & Units 2 wk ago   Methylmalonic Acid 0 - 378 nmol/L 318       Ref Range & Units 2 wk ago   HOMOCYSTEINE,TOTAL 5.0 - 15.4 umol/L 12.1      Ref Range & Units 2 wk ago   FOLIC ACID >=4.0 ng/mL >20.0        Total time spent for face to face visit, reviewing labs/imaging studies, counseling and coordination of care was: 30 Minutes spent on the date of the encounter doing chart review, review of outside records, review of test results,  interpretation of tests, patient visit, documentation and discussion with family     This note was dictated using voice recognition software.  Any grammatical or context distortions are unintentional and inherent to the software.    Orders Placed This Encounter   Procedures     CT Cervical Spine w/o Contrast     EVA w/Reflex ([facility])     Rheumatoid factor     C-Reactive Protein  Quant ([facility])      New Prescriptions    DONEPEZIL (ARICEPT) 5 MG TABLET    Take 1 tablet (5 mg) by mouth At Bedtime     Modified Medications    No medications on file

## 2021-11-16 NOTE — PATIENT INSTRUCTIONS
Patient Education     Alzheimer Disease  Alzheimer disease is a brain illness that can happen usually in older adults, but it can also happen as early as age 40. It is the most common cause of dementia. It is a progressive disease. This means it gets worse over time.  What is Alzheimer disease?  Alzheimer disease causes a series of changes to nerves of the brain. Some nerves form into clumps and tangles, and lose some of their connections to other nerves.  Healthcare providers don t fully understand what causes Alzheimer disease. But they think these may be some of the causes:    Age and family history    Certain genes    Abnormal protein deposits in the brain    Environmental factors    Problems with a person s immune system    Possibly infections  Symptoms of Alzheimer disease  The disease causes changes in behavior and thinking known as dementia. The symptoms include:    Memory loss    Confusion    Restlessness    Personality and behavior changes    Problems with judgment    Problems communicating with others    Inability to follow directions    Lack of emotion  Diagnosing Alzheimer disease  No single test is able to diagnose Alzheimer disease. Instead healthcare providers use a series of tests to rule out other health conditions. The tests may include:    A complete medical history. This may include questions about overall health and past health problems. The healthcare provider may ask how well the person can do daily tasks. The healthcare provider may ask family or close friends about any changes in behavior or personality.    Mental status test. This is a test of memory, problem solving, attention, counting, and language.     Standard medical tests. These may include blood and urine tests to find possible causes for the problem.    Brain imaging tests.  CT, MRI, or positron emission tomography (PET) may be used to rule out other causes of the problem.  Treating Alzheimer disease  Alzheimer disease has no  cure. Instead healthcare providers can help ease some symptoms. This can make a person with Alzheimer more comfortable. Treatment can also make it easier for their caregivers to take care of them.  Some medicines may help slow the decline of a person s memory, thinking, and language skills. They may help with problems of behavior, such as aggression. They can lessen hallucinations and delusions. These medicines can work for some but not all people. And they may help for only a limited time. Medicines include:    Cholinesterase inhibitors    Donepezil    Galantamine    Rivastigmine    Memantine  In some cases, behavior problems can be caused by medicine side effects. Talk with the person s healthcare provider about all medicines he or she is taking.  Keeping healthy  For a person with Alzheimer, it s important to stay healthy. Good nutrition and physical and social activity are vital. A calm and well-structured environment will help. Make sure to keep up with healthcare appointments and managing other health conditions, such as diabetes. Some people benefit from having a nutritionist help to prevent weight loss.    Caring for someone with Alzheimer  A person with Alzheimer will need more caregiving over time. Talk with your healthcare provider about caregiving resources.   Janel last reviewed this educational content on 4/1/2018 2000-2021 [employer]. All rights reserved. This information is not intended as a substitute for professional medical care. Always follow your healthcare professional's instructions.

## 2021-11-16 NOTE — NURSING NOTE
Chief Complaint   Patient presents with     Results     F/u on PET scan results     Mazin Springer on 11/16/2021 at 10:04 AM

## 2021-11-22 ENCOUNTER — TRANSFERRED RECORDS (OUTPATIENT)
Dept: HEALTH INFORMATION MANAGEMENT | Facility: CLINIC | Age: 76
End: 2021-11-22
Payer: MEDICARE

## 2021-11-22 ENCOUNTER — MEDICAL CORRESPONDENCE (OUTPATIENT)
Dept: HEALTH INFORMATION MANAGEMENT | Facility: CLINIC | Age: 76
End: 2021-11-22
Payer: MEDICARE

## 2021-11-24 ENCOUNTER — TRANSFERRED RECORDS (OUTPATIENT)
Dept: HEALTH INFORMATION MANAGEMENT | Facility: CLINIC | Age: 76
End: 2021-11-24
Payer: MEDICARE

## 2021-11-26 ENCOUNTER — HOSPITAL ENCOUNTER (OUTPATIENT)
Dept: CT IMAGING | Facility: HOSPITAL | Age: 76
End: 2021-11-26
Attending: PSYCHIATRY & NEUROLOGY
Payer: MEDICARE

## 2021-11-26 ENCOUNTER — LAB (OUTPATIENT)
Dept: LAB | Facility: HOSPITAL | Age: 76
End: 2021-11-26
Attending: PSYCHIATRY & NEUROLOGY
Payer: MEDICARE

## 2021-11-26 DIAGNOSIS — M47.22 CERVICAL RADICULOPATHY DUE TO DEGENERATIVE JOINT DISEASE OF SPINE: Primary | ICD-10-CM

## 2021-11-26 DIAGNOSIS — M47.22 CERVICAL RADICULOPATHY DUE TO DEGENERATIVE JOINT DISEASE OF SPINE: ICD-10-CM

## 2021-11-26 LAB
C REACTIVE PROTEIN LHE: 0.4 MG/DL (ref 0–0.8)
RHEUMATOID FACT SER NEPH-ACNC: <15 IU/ML (ref 0–30)

## 2021-11-26 PROCEDURE — 86141 C-REACTIVE PROTEIN HS: CPT

## 2021-11-26 PROCEDURE — 86431 RHEUMATOID FACTOR QUANT: CPT

## 2021-11-26 PROCEDURE — 72125 CT NECK SPINE W/O DYE: CPT | Mod: MG

## 2021-11-26 PROCEDURE — 36415 COLL VENOUS BLD VENIPUNCTURE: CPT

## 2021-11-26 PROCEDURE — 86038 ANTINUCLEAR ANTIBODIES: CPT

## 2021-11-29 DIAGNOSIS — M47.22 CERVICAL RADICULOPATHY DUE TO DEGENERATIVE JOINT DISEASE OF SPINE: Primary | ICD-10-CM

## 2021-11-29 LAB
ANA PAT SER IF-IMP: ABNORMAL
ANA SER QL IF: ABNORMAL
ANA TITR SER IF: ABNORMAL {TITER}

## 2021-11-29 RX ORDER — COVID-19 ANTIGEN TEST
220 KIT MISCELLANEOUS 2 TIMES DAILY PRN
Qty: 30 CAPSULE | Refills: 0 | Status: SHIPPED | OUTPATIENT
Start: 2021-11-29 | End: 2021-12-02 | Stop reason: ALTCHOICE

## 2021-12-02 ENCOUNTER — TRANSFERRED RECORDS (OUTPATIENT)
Dept: HEALTH INFORMATION MANAGEMENT | Facility: CLINIC | Age: 76
End: 2021-12-02
Payer: MEDICARE

## 2021-12-02 ENCOUNTER — TELEPHONE (OUTPATIENT)
Dept: NEUROLOGY | Facility: CLINIC | Age: 76
End: 2021-12-02
Payer: MEDICARE

## 2021-12-02 DIAGNOSIS — G44.211 INTRACTABLE EPISODIC TENSION-TYPE HEADACHE: Primary | ICD-10-CM

## 2021-12-02 RX ORDER — METHYLPREDNISOLONE 4 MG
TABLET, DOSE PACK ORAL
Qty: 21 TABLET | Refills: 0 | Status: SHIPPED | OUTPATIENT
Start: 2021-12-02 | End: 2022-01-17

## 2021-12-02 NOTE — TELEPHONE ENCOUNTER
If naproxen is not helping she should try a Medrol Dosepak.  I have sent a prescription to patient's pharmacy

## 2021-12-02 NOTE — TELEPHONE ENCOUNTER
Kurt and Naila informed- She will let us know how she is doing when she is done with it.   Transferred Medrol Dosepack to [facility] on 61 and updated chart with that pharmacy  Leonora Martinez CMA on 12/2/2021 at 3:29 PM

## 2021-12-06 NOTE — TELEPHONE ENCOUNTER
Patient calling requesting a return call from Leonora stating she forgot to mention something from previous phone call about a possible sinus infection. Please call to discuss thank you.

## 2021-12-07 NOTE — TELEPHONE ENCOUNTER
Spoke with Kurt and she thinks she may have a sinus infection- She has a pre-op tomorrow and will discuss at her visit then  Leonora Martinez CMA on 12/7/2021 at 3:43 PM

## 2021-12-15 ENCOUNTER — LAB REQUISITION (OUTPATIENT)
Dept: LAB | Facility: CLINIC | Age: 76
End: 2021-12-15
Payer: MEDICARE

## 2021-12-15 ENCOUNTER — TRANSFERRED RECORDS (OUTPATIENT)
Dept: HEALTH INFORMATION MANAGEMENT | Facility: CLINIC | Age: 76
End: 2021-12-15

## 2021-12-15 DIAGNOSIS — M65.90 SYNOVITIS AND TENOSYNOVITIS, UNSPECIFIED: ICD-10-CM

## 2021-12-15 DIAGNOSIS — Z11.59 ENCOUNTER FOR SCREENING FOR OTHER VIRAL DISEASES: ICD-10-CM

## 2021-12-15 PROCEDURE — 87176 TISSUE HOMOGENIZATION CULTR: CPT | Mod: ORL | Performed by: ORTHOPAEDIC SURGERY

## 2021-12-15 PROCEDURE — 88304 TISSUE EXAM BY PATHOLOGIST: CPT | Performed by: PATHOLOGY

## 2021-12-15 PROCEDURE — 88304 TISSUE EXAM BY PATHOLOGIST: CPT | Mod: TC,ORL,91 | Performed by: ORTHOPAEDIC SURGERY

## 2021-12-15 PROCEDURE — 87075 CULTR BACTERIA EXCEPT BLOOD: CPT | Mod: ORL | Performed by: ORTHOPAEDIC SURGERY

## 2021-12-16 ENCOUNTER — LAB REQUISITION (OUTPATIENT)
Dept: LAB | Facility: CLINIC | Age: 76
End: 2021-12-16
Payer: MEDICARE

## 2021-12-20 LAB
BACTERIA TISS BX CULT: NO GROWTH
PATH REPORT.COMMENTS IMP SPEC: NORMAL
PATH REPORT.COMMENTS IMP SPEC: NORMAL
PATH REPORT.FINAL DX SPEC: NORMAL
PATH REPORT.GROSS SPEC: NORMAL
PATH REPORT.MICROSCOPIC SPEC OTHER STN: NORMAL
PATH REPORT.RELEVANT HX SPEC: NORMAL
PHOTO IMAGE: NORMAL

## 2021-12-20 PROCEDURE — 88304 TISSUE EXAM BY PATHOLOGIST: CPT | Mod: 26 | Performed by: PATHOLOGY

## 2021-12-22 ENCOUNTER — TRANSFERRED RECORDS (OUTPATIENT)
Dept: HEALTH INFORMATION MANAGEMENT | Facility: CLINIC | Age: 76
End: 2021-12-22
Payer: MEDICARE

## 2021-12-22 DIAGNOSIS — Z11.59 ENCOUNTER FOR SCREENING FOR OTHER VIRAL DISEASES: ICD-10-CM

## 2021-12-22 LAB — BACTERIA TISS BX CULT: NORMAL

## 2021-12-27 ENCOUNTER — MEDICAL CORRESPONDENCE (OUTPATIENT)
Dept: HEALTH INFORMATION MANAGEMENT | Facility: CLINIC | Age: 76
End: 2021-12-27
Payer: MEDICARE

## 2021-12-28 ENCOUNTER — TRANSCRIBE ORDERS (OUTPATIENT)
Dept: MULTI SPECIALTY CLINIC | Facility: CLINIC | Age: 76
End: 2021-12-28
Payer: MEDICARE

## 2021-12-28 DIAGNOSIS — M41.9 SCOLIOSIS, UNSPECIFIED SCOLIOSIS TYPE, UNSPECIFIED SPINAL REGION: Primary | ICD-10-CM

## 2021-12-31 ENCOUNTER — TRANSFERRED RECORDS (OUTPATIENT)
Dept: HEALTH INFORMATION MANAGEMENT | Facility: CLINIC | Age: 76
End: 2021-12-31

## 2022-01-01 ENCOUNTER — OFFICE VISIT (OUTPATIENT)
Dept: NEUROLOGY | Facility: CLINIC | Age: 77
End: 2022-01-01
Payer: MEDICARE

## 2022-01-01 ENCOUNTER — OFFICE VISIT (OUTPATIENT)
Dept: ORTHOPEDICS | Facility: CLINIC | Age: 77
End: 2022-01-01
Payer: MEDICARE

## 2022-01-01 ENCOUNTER — PRE VISIT (OUTPATIENT)
Dept: ORTHOPEDICS | Facility: CLINIC | Age: 77
End: 2022-01-01

## 2022-01-01 ENCOUNTER — TELEPHONE (OUTPATIENT)
Dept: RHEUMATOLOGY | Facility: CLINIC | Age: 77
End: 2022-01-01

## 2022-01-01 ENCOUNTER — TRANSFERRED RECORDS (OUTPATIENT)
Dept: HEALTH INFORMATION MANAGEMENT | Facility: CLINIC | Age: 77
End: 2022-01-01

## 2022-01-01 ENCOUNTER — TRANSCRIBE ORDERS (OUTPATIENT)
Dept: OTHER | Age: 77
End: 2022-01-01

## 2022-01-01 ENCOUNTER — TELEPHONE (OUTPATIENT)
Dept: NEUROLOGY | Facility: CLINIC | Age: 77
End: 2022-01-01

## 2022-01-01 ENCOUNTER — PATIENT OUTREACH (OUTPATIENT)
Dept: CARE COORDINATION | Facility: CLINIC | Age: 77
End: 2022-01-01

## 2022-01-01 ENCOUNTER — HEALTH MAINTENANCE LETTER (OUTPATIENT)
Age: 77
End: 2022-01-01

## 2022-01-01 ENCOUNTER — VIRTUAL VISIT (OUTPATIENT)
Dept: NEUROLOGY | Facility: CLINIC | Age: 77
End: 2022-01-01
Payer: MEDICARE

## 2022-01-01 VITALS
HEART RATE: 86 BPM | WEIGHT: 142 LBS | HEIGHT: 64 IN | DIASTOLIC BLOOD PRESSURE: 69 MMHG | SYSTOLIC BLOOD PRESSURE: 108 MMHG | BODY MASS INDEX: 24.24 KG/M2

## 2022-01-01 VITALS — WEIGHT: 140 LBS | BODY MASS INDEX: 23.9 KG/M2 | HEIGHT: 64 IN

## 2022-01-01 DIAGNOSIS — F02.80 LATE ONSET ALZHEIMER'S DEMENTIA WITHOUT BEHAVIORAL DISTURBANCE (H): Primary | ICD-10-CM

## 2022-01-01 DIAGNOSIS — M54.9 CHRONIC BACK PAIN, UNSPECIFIED BACK LOCATION, UNSPECIFIED BACK PAIN LATERALITY: Primary | ICD-10-CM

## 2022-01-01 DIAGNOSIS — S13.120A ATLANTOAXIAL SUBLUXATION, INITIAL ENCOUNTER: Primary | ICD-10-CM

## 2022-01-01 DIAGNOSIS — G30.1 LATE ONSET ALZHEIMER'S DEMENTIA WITHOUT BEHAVIORAL DISTURBANCE (H): Primary | ICD-10-CM

## 2022-01-01 DIAGNOSIS — F02.80 LATE ONSET ALZHEIMER'S DEMENTIA WITHOUT BEHAVIORAL DISTURBANCE (H): ICD-10-CM

## 2022-01-01 DIAGNOSIS — M54.2 NECK PAIN: Primary | ICD-10-CM

## 2022-01-01 DIAGNOSIS — G89.29 CHRONIC BACK PAIN, UNSPECIFIED BACK LOCATION, UNSPECIFIED BACK PAIN LATERALITY: Primary | ICD-10-CM

## 2022-01-01 DIAGNOSIS — Z79.631 LONG TERM METHOTREXATE USER: Primary | ICD-10-CM

## 2022-01-01 DIAGNOSIS — G30.1 LATE ONSET ALZHEIMER'S DEMENTIA WITHOUT BEHAVIORAL DISTURBANCE (H): ICD-10-CM

## 2022-01-01 PROCEDURE — 99214 OFFICE O/P EST MOD 30 MIN: CPT | Mod: 95 | Performed by: PSYCHIATRY & NEUROLOGY

## 2022-01-01 PROCEDURE — 99214 OFFICE O/P EST MOD 30 MIN: CPT | Performed by: PSYCHIATRY & NEUROLOGY

## 2022-01-01 PROCEDURE — 99204 OFFICE O/P NEW MOD 45 MIN: CPT | Performed by: ORTHOPAEDIC SURGERY

## 2022-01-01 RX ORDER — FOLIC ACID 1 MG/1
1 TABLET ORAL DAILY
Qty: 90 TABLET | Refills: 3 | Status: SHIPPED | OUTPATIENT
Start: 2022-01-01 | End: 2023-01-01

## 2022-01-01 RX ORDER — DONEPEZIL HYDROCHLORIDE 10 MG/1
10 TABLET, FILM COATED ORAL AT BEDTIME
Qty: 90 TABLET | Refills: 3 | Status: SHIPPED | OUTPATIENT
Start: 2022-01-01

## 2022-01-01 RX ORDER — DONEPEZIL HYDROCHLORIDE 5 MG/1
TABLET, FILM COATED ORAL
Qty: 90 TABLET | Refills: 0 | Status: SHIPPED | OUTPATIENT
Start: 2022-01-01 | End: 2022-01-01 | Stop reason: ALTCHOICE

## 2022-01-01 ASSESSMENT — MONTREAL COGNITIVE ASSESSMENT (MOCA)
7. [VIGILENCE] TAP WHEN HEARING DESIGNATED LETTER: 1
VISUOSPATIAL/EXECUTIVE SUBSCORE: 1
6. READ LIST OF DIGITS [FORWARD/BACKWARD]: 2
WHAT IS THE TOTAL SCORE (OUT OF 30): 19
9. REPEAT EACH SENTENCE: 1
WHAT LEVEL OF EDUCATION WAS ATTAINED: 0
13. ORIENTATION SUBSCORE: 6
12. MEMORY INDEX SCORE: 2
4. NAME EACH OF THE THREE ANIMALS SHOWN: 2
10. [FLUENCY] NAME WORDS STARTING WITH DESIGNATED LETTER: 0
11. FOR EACH PAIR OF WORDS, WHAT CATEGORY DO THEY BELONG TO (OUT OF 2): 1
8. SERIAL SUBTRACTION OF 7S: 3

## 2022-01-04 ENCOUNTER — TRANSFERRED RECORDS (OUTPATIENT)
Dept: HEALTH INFORMATION MANAGEMENT | Facility: CLINIC | Age: 77
End: 2022-01-04

## 2022-01-05 ENCOUNTER — TELEPHONE (OUTPATIENT)
Dept: RHEUMATOLOGY | Facility: CLINIC | Age: 77
End: 2022-01-05
Payer: MEDICARE

## 2022-01-05 NOTE — TELEPHONE ENCOUNTER
[facility] Call Center    Phone Message    May a detailed message be left on voicemail: yes     Reason for Call: Other: Pt's daughter (Lucie De La Rosa  79) said she had called and spoke to Pati, was told they could be scheduled together for in-person appointments. Dr. Elmore does not have availability for that until May, says she was told they could come in sometime in March because Dr. Disla would like pt evaluated as soon as possible. Please call pt's daughter Lucie on her [phone] for scheduling. (ok to leave detailed message).      Action Taken: Message routed to:  [facility]    Travel Screening: Not Applicable

## 2022-01-10 ENCOUNTER — TRANSFERRED RECORDS (OUTPATIENT)
Dept: HEALTH INFORMATION MANAGEMENT | Facility: CLINIC | Age: 77
End: 2022-01-10

## 2022-01-12 NOTE — TELEPHONE ENCOUNTER
I can see the documents listed in the media tab. I do not think additional fax is needed presently. Thx.

## 2022-01-14 ENCOUNTER — LAB (OUTPATIENT)
Dept: LAB | Facility: CLINIC | Age: 77
End: 2022-01-14
Attending: ORTHOPAEDIC SURGERY
Payer: MEDICARE

## 2022-01-14 DIAGNOSIS — Z11.59 ENCOUNTER FOR SCREENING FOR OTHER VIRAL DISEASES: ICD-10-CM

## 2022-01-14 PROCEDURE — U0005 INFEC AGEN DETEC AMPLI PROBE: HCPCS

## 2022-01-14 PROCEDURE — U0003 INFECTIOUS AGENT DETECTION BY NUCLEIC ACID (DNA OR RNA); SEVERE ACUTE RESPIRATORY SYNDROME CORONAVIRUS 2 (SARS-COV-2) (CORONAVIRUS DISEASE [COVID-19]), AMPLIFIED PROBE TECHNIQUE, MAKING USE OF HIGH THROUGHPUT TECHNOLOGIES AS DESCRIBED BY CMS-2020-01-R: HCPCS

## 2022-01-16 LAB — SARS-COV-2 RNA RESP QL NAA+PROBE: NEGATIVE

## 2022-01-17 RX ORDER — ACETAMINOPHEN 500 MG
500-1000 TABLET ORAL DAILY PRN
Status: ON HOLD | COMMUNITY
End: 2022-01-19

## 2022-01-17 RX ORDER — AMMONIUM LACTATE 12 G/100G
CREAM TOPICAL DAILY PRN
COMMUNITY

## 2022-01-17 RX ORDER — FERROUS SULFATE 325(65) MG
325 TABLET ORAL EVERY MORNING
COMMUNITY

## 2022-01-17 RX ORDER — PREGABALIN 75 MG/1
75 CAPSULE ORAL 2 TIMES DAILY
COMMUNITY

## 2022-01-17 RX ORDER — AZELASTINE 1 MG/ML
1 SPRAY, METERED NASAL EVERY MORNING
COMMUNITY

## 2022-01-17 RX ORDER — LEVOCETIRIZINE DIHYDROCHLORIDE 5 MG/1
5 TABLET, FILM COATED ORAL EVERY MORNING
COMMUNITY
End: 2023-01-01

## 2022-01-17 NOTE — PROGRESS NOTES
Total Joint Patient Screening    1. Do you have a ride available to come to the hospital the day after your surgery by 8am with anticipated discharge of 11am? Yes  2. What is the name of this person? One of the daughters-most likely Ly  3. Do you have a  set up after surgery? Yes  4. Will your  be the same person that gives you a ride home after surgery? Yes: Ly  5. Have you received the Joint Replacement Guidebook? Yes  6. Do you have any questions about your guidebook?  No  7. Have you activated you [employer] account? No  8. Have you signed up for MY Chart access? Yes

## 2022-01-17 NOTE — PROGRESS NOTES
PTA medications updated by Medication Scribe prior to surgery via phone call with patient (last doses completed by Nurse)     Medication history sources: Patient, Surescripts and H&P  In the past week, patient estimated taking medication this percent of the time: Greater than 90%  Adherence assessment: N/A Not Observed    Significant changes made to the medication list:  None      Additional medication history information:   Patient brought own home meds: AZELASTINE HCL NA SPR & FLONASE NA SPR    Medication reconciliation completed by provider prior to medication history? No    Time spent in this activity: 60 MINUTES    The information provided in this note is only as accurate as the sources available at the time of update(s)      Prior to Admission medications    Medication Sig Last Dose Taking? Auth Provider   acetaminophen (TYLENOL) 500 MG tablet Take 500-1,000 mg by mouth daily as needed for mild pain  at PM Yes Reported, Patient   ammonium lactate (AMLACTIN) 12 % external cream Apply topically daily as needed for dry skin  at PRN Yes Reported, Patient   azelastine (ASTELIN) 0.1 % nasal spray Spray 1 spray into both nostrils every morning  at AM Yes Reported, Patient   calcium carbonate-vitamin D (CALCIUM + D) 600-200 MG-UNIT TABS Take 1 tablet by mouth daily.  at AM Yes Reported, Patient   celecoxib (CELEBREX) 200 MG capsule Take 200 mg by mouth 2 times daily   at PM Yes Reported, Patient   denosumab (PROLIA) 60 MG/ML SOSY injection Inject 60 mg Subcutaneous every 6 months  OCTOBER 2021 Yes Reported, Patient   donepezil (ARICEPT) 5 MG tablet Take 1 tablet (5 mg) by mouth At Bedtime  Patient taking differently: Take 5 mg by mouth 2 times daily   at PM Yes Augustin Terry MD   DULoxetine (CYMBALTA) 60 MG capsule Take 60 mg by mouth daily   at PM Yes Reported, Patient   famotidine (PEPCID) 40 MG tablet Take 20 mg by mouth every evening   at PM Yes Reported, Patient   ferrous sulfate (FEROSUL) 325 (65 Fe) MG tablet  Take 325 mg by mouth every morning  at AM Yes Reported, Patient   fluocin-hydroquinone-tretinoin (TRI-RODNEY) 0.01-4-0.05 % CREA Externally apply topically At Bedtime  at PM Yes Yves Zimmerman MD   fluticasone (FLONASE) 50 MCG/ACT nasal spray INSTILL 1 SPRAY INTO EACH NOSTRIL ONCE DAILY AS NEEDED FOR RHINITIS  at AM Yes Reported, Patient   glucosamine-chondroitin 500-400 MG CAPS Take 1 capsule by mouth daily.  at AM Yes Reported, Patient   levocetirizine (XYZAL) 5 MG tablet Take 5 mg by mouth every morning  at AM Yes Reported, Patient   losartan (COZAAR) 100 MG tablet Take 100 mg by mouth daily   at AM Yes Reported, Patient   methocarbamol (ROBAXIN) 500 MG tablet TAKE 1 TABLET BY MOUTH 3 TIMES A DAY  at PM Yes Reported, Patient   multivitamin, therapeutic with minerals (MULTI-VITAMIN) TABS Take 1 tablet by mouth daily.  at AM Yes Reported, Patient   Omega-3 Fatty Acids (OMEGA-3 FISH OIL) 1200 MG CAPS Take 1,200 g by mouth every 7 days on Friday  at AM Yes Reported, Patient   omeprazole (PRILOSEC) 20 MG DR capsule Take 20 mg by mouth every morning   at AM Yes Reported, Patient   pravastatin (PRAVACHOL) 20 MG tablet Take 20 mg by mouth daily   at PM Yes Reported, Patient   pregabalin (LYRICA) 75 MG capsule Take 75 mg by mouth 2 times daily  at PM Yes Reported, Patient   Vitamin D3 (VITAMIN D3) 25 mcg (1000 units) tablet Take 1,000 Units by mouth daily   at AM Yes Reported, Patient   zolpidem (AMBIEN) 5 MG tablet Take 5 mg by mouth At Bedtime   at PM Yes Reported, Patient   pregabalin (LYRICA) 100 MG capsule Take 100 mg by mouth 2 times daily    Reported, Patient

## 2022-01-18 ENCOUNTER — APPOINTMENT (OUTPATIENT)
Dept: PHYSICAL THERAPY | Facility: CLINIC | Age: 77
End: 2022-01-18
Attending: ORTHOPAEDIC SURGERY
Payer: MEDICARE

## 2022-01-18 ENCOUNTER — APPOINTMENT (OUTPATIENT)
Dept: GENERAL RADIOLOGY | Facility: CLINIC | Age: 77
End: 2022-01-18
Attending: ORTHOPAEDIC SURGERY
Payer: MEDICARE

## 2022-01-18 ENCOUNTER — HOSPITAL ENCOUNTER (OUTPATIENT)
Facility: CLINIC | Age: 77
Discharge: HOME HEALTH | End: 2022-01-19
Attending: ORTHOPAEDIC SURGERY | Admitting: ORTHOPAEDIC SURGERY
Payer: MEDICARE

## 2022-01-18 ENCOUNTER — ANESTHESIA (OUTPATIENT)
Dept: SURGERY | Facility: CLINIC | Age: 77
End: 2022-01-18
Payer: MEDICARE

## 2022-01-18 ENCOUNTER — ANESTHESIA EVENT (OUTPATIENT)
Dept: SURGERY | Facility: CLINIC | Age: 77
End: 2022-01-18
Payer: MEDICARE

## 2022-01-18 DIAGNOSIS — Z96.651 S/P TOTAL KNEE ARTHROPLASTY, RIGHT: Primary | ICD-10-CM

## 2022-01-18 DIAGNOSIS — Z96.651 S/P REVISION OF TOTAL KNEE, RIGHT: ICD-10-CM

## 2022-01-18 PROCEDURE — C1776 JOINT DEVICE (IMPLANTABLE): HCPCS | Performed by: ORTHOPAEDIC SURGERY

## 2022-01-18 PROCEDURE — 250N000011 HC RX IP 250 OP 636: Performed by: NURSE ANESTHETIST, CERTIFIED REGISTERED

## 2022-01-18 PROCEDURE — 250N000009 HC RX 250: Performed by: ORTHOPAEDIC SURGERY

## 2022-01-18 PROCEDURE — 258N000003 HC RX IP 258 OP 636: Performed by: ANESTHESIOLOGY

## 2022-01-18 PROCEDURE — 258N000003 HC RX IP 258 OP 636: Performed by: NURSE ANESTHETIST, CERTIFIED REGISTERED

## 2022-01-18 PROCEDURE — 250N000009 HC RX 250: Performed by: NURSE ANESTHETIST, CERTIFIED REGISTERED

## 2022-01-18 PROCEDURE — 999N000063 XR KNEE PORT RIGHT 1/2 VIEWS: Mod: RT

## 2022-01-18 PROCEDURE — 258N000003 HC RX IP 258 OP 636: Performed by: ORTHOPAEDIC SURGERY

## 2022-01-18 PROCEDURE — 258N000003 HC RX IP 258 OP 636: Performed by: PHYSICIAN ASSISTANT

## 2022-01-18 PROCEDURE — 250N000013 HC RX MED GY IP 250 OP 250 PS 637: Performed by: ANESTHESIOLOGY

## 2022-01-18 PROCEDURE — 710N000009 HC RECOVERY PHASE 1, LEVEL 1, PER MIN: Performed by: ORTHOPAEDIC SURGERY

## 2022-01-18 PROCEDURE — 250N000011 HC RX IP 250 OP 636: Performed by: ANESTHESIOLOGY

## 2022-01-18 PROCEDURE — 250N000011 HC RX IP 250 OP 636: Performed by: ORTHOPAEDIC SURGERY

## 2022-01-18 PROCEDURE — 250N000013 HC RX MED GY IP 250 OP 250 PS 637: Performed by: PHYSICIAN ASSISTANT

## 2022-01-18 PROCEDURE — 97161 PT EVAL LOW COMPLEX 20 MIN: CPT | Mod: GP

## 2022-01-18 PROCEDURE — 370N000017 HC ANESTHESIA TECHNICAL FEE, PER MIN: Performed by: ORTHOPAEDIC SURGERY

## 2022-01-18 PROCEDURE — 999N000141 HC STATISTIC PRE-PROCEDURE NURSING ASSESSMENT: Performed by: ORTHOPAEDIC SURGERY

## 2022-01-18 PROCEDURE — 278N000051 HC OR IMPLANT GENERAL: Performed by: ORTHOPAEDIC SURGERY

## 2022-01-18 PROCEDURE — 360N000077 HC SURGERY LEVEL 4, PER MIN: Performed by: ORTHOPAEDIC SURGERY

## 2022-01-18 PROCEDURE — 258N000001 HC RX 258: Performed by: ORTHOPAEDIC SURGERY

## 2022-01-18 PROCEDURE — 250N000013 HC RX MED GY IP 250 OP 250 PS 637: Performed by: ORTHOPAEDIC SURGERY

## 2022-01-18 PROCEDURE — 250N000009 HC RX 250: Performed by: ANESTHESIOLOGY

## 2022-01-18 PROCEDURE — 272N000001 HC OR GENERAL SUPPLY STERILE: Performed by: ORTHOPAEDIC SURGERY

## 2022-01-18 PROCEDURE — 97530 THERAPEUTIC ACTIVITIES: CPT | Mod: GP

## 2022-01-18 PROCEDURE — 250N000011 HC RX IP 250 OP 636: Performed by: PHYSICIAN ASSISTANT

## 2022-01-18 DEVICE — BONE CEMENT RADIOPAQUE SIMPLEX HV FULL DOSE 6194-1-001: Type: IMPLANTABLE DEVICE | Site: KNEE | Status: FUNCTIONAL

## 2022-01-18 DEVICE — IMP COMP FEMORAL S&N LEGION PS NP NARROW 4 RT 71933653: Type: IMPLANTABLE DEVICE | Site: KNEE | Status: FUNCTIONAL

## 2022-01-18 DEVICE — IMP COMP PATELLA GENESIS II 13X29MM 71420574: Type: IMPLANTABLE DEVICE | Site: KNEE | Status: FUNCTIONAL

## 2022-01-18 DEVICE — IMP BASEPLATE TIBIAL GENESIS II SZ 3 RT TI 71420182: Type: IMPLANTABLE DEVICE | Site: KNEE | Status: FUNCTIONAL

## 2022-01-18 DEVICE — IMPLANTABLE DEVICE: Type: IMPLANTABLE DEVICE | Site: KNEE | Status: FUNCTIONAL

## 2022-01-18 RX ORDER — LABETALOL HYDROCHLORIDE 5 MG/ML
INJECTION, SOLUTION INTRAVENOUS PRN
Status: DISCONTINUED | OUTPATIENT
Start: 2022-01-18 | End: 2022-01-18

## 2022-01-18 RX ORDER — ONDANSETRON 2 MG/ML
INJECTION INTRAMUSCULAR; INTRAVENOUS PRN
Status: DISCONTINUED | OUTPATIENT
Start: 2022-01-18 | End: 2022-01-18

## 2022-01-18 RX ORDER — PANTOPRAZOLE SODIUM 40 MG/1
40 TABLET, DELAYED RELEASE ORAL EVERY MORNING
Status: DISCONTINUED | OUTPATIENT
Start: 2022-01-19 | End: 2022-01-19 | Stop reason: HOSPADM

## 2022-01-18 RX ORDER — HYDROMORPHONE HYDROCHLORIDE 2 MG/1
4 TABLET ORAL EVERY 4 HOURS PRN
Status: DISCONTINUED | OUTPATIENT
Start: 2022-01-18 | End: 2022-01-19 | Stop reason: HOSPADM

## 2022-01-18 RX ORDER — LIDOCAINE 40 MG/G
CREAM TOPICAL
Status: DISCONTINUED | OUTPATIENT
Start: 2022-01-18 | End: 2022-01-19 | Stop reason: HOSPADM

## 2022-01-18 RX ORDER — CEFAZOLIN SODIUM 2 G/100ML
2 INJECTION, SOLUTION INTRAVENOUS SEE ADMIN INSTRUCTIONS
Status: DISCONTINUED | OUTPATIENT
Start: 2022-01-18 | End: 2022-01-18 | Stop reason: HOSPADM

## 2022-01-18 RX ORDER — SODIUM CHLORIDE, SODIUM LACTATE, POTASSIUM CHLORIDE, CALCIUM CHLORIDE 600; 310; 30; 20 MG/100ML; MG/100ML; MG/100ML; MG/100ML
INJECTION, SOLUTION INTRAVENOUS CONTINUOUS
Status: DISCONTINUED | OUTPATIENT
Start: 2022-01-18 | End: 2022-01-19 | Stop reason: HOSPADM

## 2022-01-18 RX ORDER — ONDANSETRON 4 MG/1
4 TABLET, ORALLY DISINTEGRATING ORAL EVERY 30 MIN PRN
Status: DISCONTINUED | OUTPATIENT
Start: 2022-01-18 | End: 2022-01-18 | Stop reason: HOSPADM

## 2022-01-18 RX ORDER — HYDROXYZINE HYDROCHLORIDE 10 MG/1
10 TABLET, FILM COATED ORAL EVERY 6 HOURS PRN
Status: DISCONTINUED | OUTPATIENT
Start: 2022-01-18 | End: 2022-01-19 | Stop reason: HOSPADM

## 2022-01-18 RX ORDER — CEFAZOLIN SODIUM 1 G/3ML
1 INJECTION, POWDER, FOR SOLUTION INTRAMUSCULAR; INTRAVENOUS EVERY 8 HOURS
Status: COMPLETED | OUTPATIENT
Start: 2022-01-18 | End: 2022-01-19

## 2022-01-18 RX ORDER — HYDROMORPHONE HYDROCHLORIDE 2 MG/1
2 TABLET ORAL EVERY 4 HOURS PRN
Status: DISCONTINUED | OUTPATIENT
Start: 2022-01-18 | End: 2022-01-19 | Stop reason: HOSPADM

## 2022-01-18 RX ORDER — ACETAMINOPHEN 325 MG/1
650 TABLET ORAL EVERY 4 HOURS PRN
Qty: 100 TABLET | Refills: 0 | Status: SHIPPED | OUTPATIENT
Start: 2022-01-18

## 2022-01-18 RX ORDER — POLYETHYLENE GLYCOL 3350 17 G/17G
17 POWDER, FOR SOLUTION ORAL DAILY
Status: DISCONTINUED | OUTPATIENT
Start: 2022-01-19 | End: 2022-01-19 | Stop reason: HOSPADM

## 2022-01-18 RX ORDER — DONEPEZIL HYDROCHLORIDE 5 MG/1
5 TABLET, FILM COATED ORAL AT BEDTIME
Status: DISCONTINUED | OUTPATIENT
Start: 2022-01-18 | End: 2022-01-19 | Stop reason: HOSPADM

## 2022-01-18 RX ORDER — LOSARTAN POTASSIUM 100 MG/1
100 TABLET ORAL DAILY
Status: DISCONTINUED | OUTPATIENT
Start: 2022-01-18 | End: 2022-01-19 | Stop reason: HOSPADM

## 2022-01-18 RX ORDER — PROPOFOL 10 MG/ML
INJECTION, EMULSION INTRAVENOUS PRN
Status: DISCONTINUED | OUTPATIENT
Start: 2022-01-18 | End: 2022-01-18

## 2022-01-18 RX ORDER — NALOXONE HYDROCHLORIDE 0.4 MG/ML
0.4 INJECTION, SOLUTION INTRAMUSCULAR; INTRAVENOUS; SUBCUTANEOUS
Status: DISCONTINUED | OUTPATIENT
Start: 2022-01-18 | End: 2022-01-19 | Stop reason: HOSPADM

## 2022-01-18 RX ORDER — HYDROMORPHONE HYDROCHLORIDE 2 MG/1
2 TABLET ORAL EVERY 4 HOURS PRN
Qty: 40 TABLET | Refills: 0 | Status: SHIPPED | OUTPATIENT
Start: 2022-01-18 | End: 2022-01-01

## 2022-01-18 RX ORDER — FENTANYL CITRATE 50 UG/ML
INJECTION, SOLUTION INTRAMUSCULAR; INTRAVENOUS PRN
Status: DISCONTINUED | OUTPATIENT
Start: 2022-01-18 | End: 2022-01-18

## 2022-01-18 RX ORDER — CEFAZOLIN SODIUM 2 G/100ML
2 INJECTION, SOLUTION INTRAVENOUS
Status: COMPLETED | OUTPATIENT
Start: 2022-01-18 | End: 2022-01-18

## 2022-01-18 RX ORDER — HYDROMORPHONE HCL IN WATER/PF 6 MG/30 ML
0.2 PATIENT CONTROLLED ANALGESIA SYRINGE INTRAVENOUS EVERY 5 MIN PRN
Status: DISCONTINUED | OUTPATIENT
Start: 2022-01-18 | End: 2022-01-18 | Stop reason: HOSPADM

## 2022-01-18 RX ORDER — FENTANYL CITRATE 0.05 MG/ML
50 INJECTION, SOLUTION INTRAMUSCULAR; INTRAVENOUS
Status: DISCONTINUED | OUTPATIENT
Start: 2022-01-18 | End: 2022-01-18 | Stop reason: HOSPADM

## 2022-01-18 RX ORDER — LEVOCETIRIZINE DIHYDROCHLORIDE 5 MG/1
5 TABLET, FILM COATED ORAL EVERY MORNING
Status: DISCONTINUED | OUTPATIENT
Start: 2022-01-19 | End: 2022-01-19 | Stop reason: HOSPADM

## 2022-01-18 RX ORDER — HYDROXYZINE HYDROCHLORIDE 10 MG/1
10 TABLET, FILM COATED ORAL EVERY 6 HOURS PRN
Qty: 30 TABLET | Refills: 0 | Status: SHIPPED | OUTPATIENT
Start: 2022-01-18 | End: 2022-01-19

## 2022-01-18 RX ORDER — DEXAMETHASONE SODIUM PHOSPHATE 4 MG/ML
INJECTION, SOLUTION INTRA-ARTICULAR; INTRALESIONAL; INTRAMUSCULAR; INTRAVENOUS; SOFT TISSUE PRN
Status: DISCONTINUED | OUTPATIENT
Start: 2022-01-18 | End: 2022-01-18

## 2022-01-18 RX ORDER — AMOXICILLIN 250 MG
1 CAPSULE ORAL 2 TIMES DAILY
Status: DISCONTINUED | OUTPATIENT
Start: 2022-01-18 | End: 2022-01-19 | Stop reason: HOSPADM

## 2022-01-18 RX ORDER — SODIUM CHLORIDE, SODIUM LACTATE, POTASSIUM CHLORIDE, CALCIUM CHLORIDE 600; 310; 30; 20 MG/100ML; MG/100ML; MG/100ML; MG/100ML
INJECTION, SOLUTION INTRAVENOUS CONTINUOUS
Status: DISCONTINUED | OUTPATIENT
Start: 2022-01-18 | End: 2022-01-18 | Stop reason: HOSPADM

## 2022-01-18 RX ORDER — ONDANSETRON 2 MG/ML
4 INJECTION INTRAMUSCULAR; INTRAVENOUS EVERY 30 MIN PRN
Status: DISCONTINUED | OUTPATIENT
Start: 2022-01-18 | End: 2022-01-18 | Stop reason: HOSPADM

## 2022-01-18 RX ORDER — NALOXONE HYDROCHLORIDE 0.4 MG/ML
0.2 INJECTION, SOLUTION INTRAMUSCULAR; INTRAVENOUS; SUBCUTANEOUS
Status: DISCONTINUED | OUTPATIENT
Start: 2022-01-18 | End: 2022-01-19 | Stop reason: HOSPADM

## 2022-01-18 RX ORDER — PROPOFOL 10 MG/ML
INJECTION, EMULSION INTRAVENOUS CONTINUOUS PRN
Status: DISCONTINUED | OUTPATIENT
Start: 2022-01-18 | End: 2022-01-18

## 2022-01-18 RX ORDER — AMOXICILLIN 250 MG
1-2 CAPSULE ORAL 2 TIMES DAILY
Qty: 30 TABLET | Refills: 0 | Status: SHIPPED | OUTPATIENT
Start: 2022-01-18

## 2022-01-18 RX ORDER — HYDROMORPHONE HCL IN WATER/PF 6 MG/30 ML
0.2 PATIENT CONTROLLED ANALGESIA SYRINGE INTRAVENOUS
Status: DISCONTINUED | OUTPATIENT
Start: 2022-01-18 | End: 2022-01-19 | Stop reason: HOSPADM

## 2022-01-18 RX ORDER — ACETAMINOPHEN 325 MG/1
975 TABLET ORAL EVERY 8 HOURS
Status: DISCONTINUED | OUTPATIENT
Start: 2022-01-18 | End: 2022-01-19 | Stop reason: HOSPADM

## 2022-01-18 RX ORDER — ONDANSETRON 2 MG/ML
4 INJECTION INTRAMUSCULAR; INTRAVENOUS EVERY 6 HOURS PRN
Status: DISCONTINUED | OUTPATIENT
Start: 2022-01-18 | End: 2022-01-19 | Stop reason: HOSPADM

## 2022-01-18 RX ORDER — BISACODYL 10 MG
10 SUPPOSITORY, RECTAL RECTAL DAILY PRN
Status: DISCONTINUED | OUTPATIENT
Start: 2022-01-18 | End: 2022-01-19 | Stop reason: HOSPADM

## 2022-01-18 RX ORDER — FAMOTIDINE 20 MG/1
20 TABLET, FILM COATED ORAL EVERY EVENING
Status: DISCONTINUED | OUTPATIENT
Start: 2022-01-18 | End: 2022-01-19 | Stop reason: HOSPADM

## 2022-01-18 RX ORDER — MAGNESIUM HYDROXIDE 1200 MG/15ML
LIQUID ORAL PRN
Status: DISCONTINUED | OUTPATIENT
Start: 2022-01-18 | End: 2022-01-18 | Stop reason: HOSPADM

## 2022-01-18 RX ORDER — HYDROMORPHONE HCL IN WATER/PF 6 MG/30 ML
0.4 PATIENT CONTROLLED ANALGESIA SYRINGE INTRAVENOUS
Status: DISCONTINUED | OUTPATIENT
Start: 2022-01-18 | End: 2022-01-19 | Stop reason: HOSPADM

## 2022-01-18 RX ORDER — FENTANYL CITRATE 50 UG/ML
25 INJECTION, SOLUTION INTRAMUSCULAR; INTRAVENOUS EVERY 5 MIN PRN
Status: DISCONTINUED | OUTPATIENT
Start: 2022-01-18 | End: 2022-01-18 | Stop reason: HOSPADM

## 2022-01-18 RX ORDER — PROCHLORPERAZINE MALEATE 5 MG
5 TABLET ORAL EVERY 6 HOURS PRN
Status: DISCONTINUED | OUTPATIENT
Start: 2022-01-18 | End: 2022-01-19 | Stop reason: HOSPADM

## 2022-01-18 RX ORDER — EPHEDRINE SULFATE 50 MG/ML
INJECTION, SOLUTION INTRAMUSCULAR; INTRAVENOUS; SUBCUTANEOUS PRN
Status: DISCONTINUED | OUTPATIENT
Start: 2022-01-18 | End: 2022-01-18

## 2022-01-18 RX ORDER — ACETAMINOPHEN 325 MG/1
650 TABLET ORAL EVERY 4 HOURS PRN
Status: DISCONTINUED | OUTPATIENT
Start: 2022-01-21 | End: 2022-01-19 | Stop reason: HOSPADM

## 2022-01-18 RX ORDER — ONDANSETRON 4 MG/1
4 TABLET, ORALLY DISINTEGRATING ORAL EVERY 6 HOURS PRN
Status: DISCONTINUED | OUTPATIENT
Start: 2022-01-18 | End: 2022-01-19 | Stop reason: HOSPADM

## 2022-01-18 RX ORDER — LIDOCAINE HYDROCHLORIDE 20 MG/ML
INJECTION, SOLUTION INFILTRATION; PERINEURAL PRN
Status: DISCONTINUED | OUTPATIENT
Start: 2022-01-18 | End: 2022-01-18

## 2022-01-18 RX ORDER — CELECOXIB 100 MG/1
100 CAPSULE ORAL 2 TIMES DAILY
Status: DISCONTINUED | OUTPATIENT
Start: 2022-01-18 | End: 2022-01-19 | Stop reason: HOSPADM

## 2022-01-18 RX ORDER — TRANEXAMIC ACID 650 MG/1
1950 TABLET ORAL ONCE
Status: COMPLETED | OUTPATIENT
Start: 2022-01-18 | End: 2022-01-18

## 2022-01-18 RX ORDER — DULOXETIN HYDROCHLORIDE 60 MG/1
60 CAPSULE, DELAYED RELEASE ORAL DAILY
Status: DISCONTINUED | OUTPATIENT
Start: 2022-01-18 | End: 2022-01-19 | Stop reason: HOSPADM

## 2022-01-18 RX ORDER — SCOLOPAMINE TRANSDERMAL SYSTEM 1 MG/1
1 PATCH, EXTENDED RELEASE TRANSDERMAL ONCE
Status: COMPLETED | OUTPATIENT
Start: 2022-01-18 | End: 2022-01-19

## 2022-01-18 RX ORDER — PRAVASTATIN SODIUM 20 MG
20 TABLET ORAL DAILY
Status: DISCONTINUED | OUTPATIENT
Start: 2022-01-18 | End: 2022-01-19 | Stop reason: HOSPADM

## 2022-01-18 RX ORDER — HYDROMORPHONE HYDROCHLORIDE 4 MG/1
4 TABLET ORAL ONCE
Status: COMPLETED | OUTPATIENT
Start: 2022-01-18 | End: 2022-01-18

## 2022-01-18 RX ORDER — FERROUS SULFATE 325(65) MG
325 TABLET ORAL EVERY MORNING
Status: DISCONTINUED | OUTPATIENT
Start: 2022-01-19 | End: 2022-01-19 | Stop reason: HOSPADM

## 2022-01-18 RX ADMIN — HYDROMORPHONE HYDROCHLORIDE 0.2 MG: 0.2 INJECTION, SOLUTION INTRAMUSCULAR; INTRAVENOUS; SUBCUTANEOUS at 12:14

## 2022-01-18 RX ADMIN — ONDANSETRON 4 MG: 2 INJECTION INTRAMUSCULAR; INTRAVENOUS at 10:59

## 2022-01-18 RX ADMIN — LABETALOL HYDROCHLORIDE 5 MG: 5 INJECTION, SOLUTION INTRAVENOUS at 10:16

## 2022-01-18 RX ADMIN — TRANEXAMIC ACID 1950 MG: 650 TABLET ORAL at 08:42

## 2022-01-18 RX ADMIN — HYDROMORPHONE HYDROCHLORIDE 0.2 MG: 0.2 INJECTION, SOLUTION INTRAMUSCULAR; INTRAVENOUS; SUBCUTANEOUS at 11:53

## 2022-01-18 RX ADMIN — HYDROMORPHONE HYDROCHLORIDE 4 MG: 2 TABLET ORAL at 12:32

## 2022-01-18 RX ADMIN — PHENYLEPHRINE HYDROCHLORIDE 100 MCG: 10 INJECTION INTRAVENOUS at 10:51

## 2022-01-18 RX ADMIN — MIDAZOLAM 1 MG: 1 INJECTION INTRAMUSCULAR; INTRAVENOUS at 08:54

## 2022-01-18 RX ADMIN — ACETAMINOPHEN 975 MG: 325 TABLET, FILM COATED ORAL at 15:00

## 2022-01-18 RX ADMIN — SODIUM CHLORIDE, POTASSIUM CHLORIDE, SODIUM LACTATE AND CALCIUM CHLORIDE: 600; 310; 30; 20 INJECTION, SOLUTION INTRAVENOUS at 10:49

## 2022-01-18 RX ADMIN — FENTANYL CITRATE 25 MCG: 50 INJECTION, SOLUTION INTRAMUSCULAR; INTRAVENOUS at 11:39

## 2022-01-18 RX ADMIN — SODIUM CHLORIDE, POTASSIUM CHLORIDE, SODIUM LACTATE AND CALCIUM CHLORIDE: 600; 310; 30; 20 INJECTION, SOLUTION INTRAVENOUS at 21:39

## 2022-01-18 RX ADMIN — PHENYLEPHRINE HYDROCHLORIDE 100 MCG: 10 INJECTION INTRAVENOUS at 10:41

## 2022-01-18 RX ADMIN — PREGABALIN 75 MG: 50 CAPSULE ORAL at 21:35

## 2022-01-18 RX ADMIN — Medication 5 MG: at 10:39

## 2022-01-18 RX ADMIN — ASPIRIN 325 MG: 325 TABLET, COATED ORAL at 17:49

## 2022-01-18 RX ADMIN — DEXAMETHASONE SODIUM PHOSPHATE 4 MG: 4 INJECTION, SOLUTION INTRA-ARTICULAR; INTRALESIONAL; INTRAMUSCULAR; INTRAVENOUS; SOFT TISSUE at 09:39

## 2022-01-18 RX ADMIN — LABETALOL HYDROCHLORIDE 5 MG: 5 INJECTION, SOLUTION INTRAVENOUS at 10:27

## 2022-01-18 RX ADMIN — ONDANSETRON 4 MG: 2 INJECTION INTRAMUSCULAR; INTRAVENOUS at 19:52

## 2022-01-18 RX ADMIN — FENTANYL CITRATE 25 MCG: 50 INJECTION, SOLUTION INTRAMUSCULAR; INTRAVENOUS at 11:28

## 2022-01-18 RX ADMIN — CEFAZOLIN SODIUM 2 G: 2 INJECTION, SOLUTION INTRAVENOUS at 09:27

## 2022-01-18 RX ADMIN — HYDROMORPHONE HYDROCHLORIDE 0.2 MG: 0.2 INJECTION, SOLUTION INTRAMUSCULAR; INTRAVENOUS; SUBCUTANEOUS at 15:19

## 2022-01-18 RX ADMIN — HYDROMORPHONE HYDROCHLORIDE 0.2 MG: 0.2 INJECTION, SOLUTION INTRAMUSCULAR; INTRAVENOUS; SUBCUTANEOUS at 12:07

## 2022-01-18 RX ADMIN — SENNOSIDES AND DOCUSATE SODIUM 1 TABLET: 50; 8.6 TABLET ORAL at 21:42

## 2022-01-18 RX ADMIN — FAMOTIDINE 20 MG: 20 TABLET ORAL at 19:01

## 2022-01-18 RX ADMIN — FENTANYL CITRATE 50 MCG: 50 INJECTION, SOLUTION INTRAMUSCULAR; INTRAVENOUS at 10:12

## 2022-01-18 RX ADMIN — DONEPEZIL HYDROCHLORIDE 5 MG: 5 TABLET, FILM COATED ORAL at 21:43

## 2022-01-18 RX ADMIN — ACETAMINOPHEN 975 MG: 325 TABLET, FILM COATED ORAL at 21:42

## 2022-01-18 RX ADMIN — LOSARTAN POTASSIUM 100 MG: 100 TABLET, FILM COATED ORAL at 15:00

## 2022-01-18 RX ADMIN — MIDAZOLAM 1 MG: 1 INJECTION INTRAMUSCULAR; INTRAVENOUS at 08:56

## 2022-01-18 RX ADMIN — BUPIVACAINE HYDROCHLORIDE 15 ML: 5 INJECTION, SOLUTION EPIDURAL; INTRACAUDAL at 12:43

## 2022-01-18 RX ADMIN — CELECOXIB 100 MG: 100 CAPSULE ORAL at 21:36

## 2022-01-18 RX ADMIN — FENTANYL CITRATE 50 MCG: 50 INJECTION, SOLUTION INTRAMUSCULAR; INTRAVENOUS at 09:55

## 2022-01-18 RX ADMIN — FENTANYL CITRATE 50 MCG: 50 INJECTION, SOLUTION INTRAMUSCULAR; INTRAVENOUS at 10:05

## 2022-01-18 RX ADMIN — PROPOFOL 180 MG: 10 INJECTION, EMULSION INTRAVENOUS at 09:32

## 2022-01-18 RX ADMIN — PROPOFOL 200 MCG/KG/MIN: 10 INJECTION, EMULSION INTRAVENOUS at 09:32

## 2022-01-18 RX ADMIN — LIDOCAINE HYDROCHLORIDE 80 MG: 20 INJECTION, SOLUTION INFILTRATION; PERINEURAL at 09:32

## 2022-01-18 RX ADMIN — HYDROMORPHONE HYDROCHLORIDE 2 MG: 2 TABLET ORAL at 18:42

## 2022-01-18 RX ADMIN — Medication 12 MCG: at 10:04

## 2022-01-18 RX ADMIN — PROPOFOL 20 MG: 10 INJECTION, EMULSION INTRAVENOUS at 09:34

## 2022-01-18 RX ADMIN — PRAVASTATIN SODIUM 20 MG: 20 TABLET ORAL at 17:49

## 2022-01-18 RX ADMIN — SCOPALAMINE 1 PATCH: 1 PATCH, EXTENDED RELEASE TRANSDERMAL at 08:52

## 2022-01-18 RX ADMIN — FENTANYL CITRATE 50 MCG: 50 INJECTION, SOLUTION INTRAMUSCULAR; INTRAVENOUS at 09:32

## 2022-01-18 RX ADMIN — CEFAZOLIN 1 G: 1 INJECTION, POWDER, FOR SOLUTION INTRAMUSCULAR; INTRAVENOUS at 17:50

## 2022-01-18 RX ADMIN — PROCHLORPERAZINE EDISYLATE 5 MG: 5 INJECTION INTRAMUSCULAR; INTRAVENOUS at 21:33

## 2022-01-18 RX ADMIN — SODIUM CHLORIDE, POTASSIUM CHLORIDE, SODIUM LACTATE AND CALCIUM CHLORIDE: 600; 310; 30; 20 INJECTION, SOLUTION INTRAVENOUS at 15:22

## 2022-01-18 RX ADMIN — SODIUM CHLORIDE, POTASSIUM CHLORIDE, SODIUM LACTATE AND CALCIUM CHLORIDE: 600; 310; 30; 20 INJECTION, SOLUTION INTRAVENOUS at 08:42

## 2022-01-18 RX ADMIN — DULOXETINE 60 MG: 60 CAPSULE, DELAYED RELEASE ORAL at 17:49

## 2022-01-18 RX ADMIN — Medication 8 MCG: at 10:08

## 2022-01-18 ASSESSMENT — MIFFLIN-ST. JEOR: SCORE: 1128.64

## 2022-01-18 NOTE — BRIEF OP NOTE
[facility]    Brief Operative Note    Pre-operative diagnosis: Osteoarthritis of right knee [M17.11]  Post-operative diagnosis Same as pre-operative diagnosis    Procedure: Procedure(s):  RIGHT TOTAL KNEE ARTHROPLASTY  Surgeon: Surgeon(s) and Role:     * Yves Li MD - Primary     * Twila Castorena PA-C - Assisting  Anesthesia: General   Estimated Blood Loss: 200 ml    Drains: Hemovac  Specimens: * No specimens in log *  Findings:   None.  Complications: None.  Implants:   Implant Name Type Inv. Item Serial No.  Lot No. LRB No. Used Action   IMP BASEPLATE TIBIAL TREVER II SZ 3 RT TI [id number] - [id number] Total Joint Component/Insert IMP BASEPLATE TIBIAL TREVER II SZ 3 RT TI [id number]  [employer] [id number] Right 1 Implanted   IMP COMP FEMORAL S&N LEGION PS NP NARROW 4 RT [id number] - [id number] Total Joint Component/Insert IMP COMP FEMORAL S&N LEGION PS NP NARROW 4 RT [id number]  [employer] [id number] Right 1 Implanted   IMP COMP PATELLA TREVER II [id number] - [id number] Total Joint Component/Insert IMP COMP PATELLA TREVER II [id number]  [employer] [id number] Right 1 Implanted   BONE CEMENT RADIOPAQUE SIMPLEX HV FULL DOSE 6194-1-001 - [id number] Cement, Bone BONE CEMENT RADIOPAQUE SIMPLEX HV FULL DOSE 6194-1-001  [employer] [id number] Right 1 Implanted   IMP COMP KNEE S&N LEGION PS HI-FLEX XLPE SZ3-4 10MM [id number] - [id number] Total Joint Component/Insert IMP COMP KNEE S&N LEGION PS HI-FLEX XLPE SZ3-4 10MM [id number]  [employer] [id number] Right 1 Implanted

## 2022-01-18 NOTE — ANESTHESIA PREPROCEDURE EVALUATION
"Anesthesia Pre-Procedure Evaluation    Patient: Leonardo De La Rosa   MRN: [id number] : 1945        Preoperative Diagnosis: Osteoarthritis of right knee [M17.11]    Procedure : Procedure(s):  RIGHT TOTAL KNEE ARTHROPLASTY          Past Medical History:   Diagnosis Date     Arthritis     Feet Hands and back.     PONV (postoperative nausea and vomiting)     Severe     Postsurgical arthrodesis status 2013     Status post lumbar spinal fusion 2020      Past Surgical History:   Procedure Laterality Date     ARTHRODESIS FOOT  2013    Procedure: ARTHRODESIS FOOT;  Left Second and Third Tarsal metatarsal Arthrodesis, Bunion Correction, Lapidus Procedure, Weil Osteotomy   ;  Surgeon: Alber Pace MD;  Location: [facility] OR     BUNIONECTOMY LAPIDUS WITH TARSAL METATARSAL (TMT) FUSION  2013    Procedure: BUNIONECTOMY LAPIDUS WITH TARSAL METATARSAL (TMT) FUSION;;  Surgeon: Alber Pace MD;  Location: [facility] OR     FOOT SURGERY      right bunionectomy.     FOOT SURGERY       HYSTERECTOMY       HYSTERECTOMY TOTAL ABDOMINAL, BILATERAL SALPINGO-OOPHORECTOMY, COMBINED       KNEE SURGERY       ZZC TOTAL KNEE ARTHROPLASTY      left      Allergies   Allergen Reactions     Estratest H.S. Other (See Comments) and Unknown     Other reaction(s): Other (see comments)  Increase lipids  Outside source did not list reaction  Increase lipids       Hydrocodone      Oxycodone      Propofol Nausea and Vomiting     Outside source states \"injectable and inhaled\"  Outside source states \"injectable and inhaled\"  Outside source states \"injectable and inhaled\"  Outside source states \"injectable and inhaled\"       Reglan      Estrogens Rash     Other reaction(s): Other (see comments)  Outside source did not list reaction  Estrogen patches, from patch only, ( Adhesive )  does tolerate estrogen other forms        Penicillins Nausea and Vomiting and Nausea     Other reaction(s): GI intolerance, GI Upset  Patient states " upset stomach.  Patient states upset stomach.  Patient states upset stomach.  Patient states upset stomach.        Social History     Tobacco Use     Smoking status: Never Smoker     Smokeless tobacco: Never Used   Substance Use Topics     Alcohol use: No      Wt Readings from Last 1 Encounters:   10/26/21 64 kg (141 lb)        Anesthesia Evaluation   Pt has had prior anesthetic.     History of anesthetic complications       ROS/MED HX  ENT/Pulmonary:       Neurologic:       Cardiovascular: Comment: Mild as  EF 63%    (+) Dyslipidemia -----    METS/Exercise Tolerance:     Hematologic:     (+) anemia,     Musculoskeletal:   (+) arthritis,     GI/Hepatic:     (+) GERD,  (-) liver disease   Renal/Genitourinary:    (-) renal disease   Endo:       Psychiatric/Substance Use:       Infectious Disease:       Malignancy:       Other:      (+) , H/O Chronic Pain,        Physical Exam    Airway        Mallampati: II   TM distance: > 3 FB   Neck ROM: full     Respiratory Devices and Support         Dental  no notable dental history         Cardiovascular   cardiovascular exam normal          Pulmonary           breath sounds clear to auscultation           OUTSIDE LABS:  CBC:   Lab Results   Component Value Date    WBC 8.5 08/25/2020    HGB 13.5 08/25/2020    HGB 11.8 04/24/2013    HCT 40.7 08/25/2020     08/25/2020     BMP:   Lab Results   Component Value Date     (L) 08/25/2020     06/19/2019    POTASSIUM 4.2 08/25/2020    POTASSIUM 4.4 06/19/2019    CHLORIDE 98 08/25/2020    CHLORIDE 102 06/19/2019    CO2 23 08/25/2020    CO2 25 06/19/2019    BUN 13 08/25/2020    BUN 14 06/19/2019    CR 0.76 08/25/2020    CR 0.69 06/19/2019    GLC 79 11/12/2021     08/25/2020     COAGS:   Lab Results   Component Value Date    PTT 27 08/25/2020    INR 0.94 08/25/2020     POC: No results found for: BGM, HCG, HCGS  HEPATIC: No results found for: ALBUMIN, PROTTOTAL, ALT, AST, GGT, ALKPHOS, BILITOTAL, BILIDIRECT,  OMAR  OTHER:   Lab Results   Component Value Date    SRI 9.7 08/25/2020    MAG 2.1 08/25/2020    TSH 1.39 12/10/2018    CRP 0.4 11/26/2021       Anesthesia Plan    ASA Status:  2      Anesthesia Type: General.     - Airway: LMA              Consents    Anesthesia Plan(s) and associated risks, benefits, and realistic alternatives discussed. Questions answered and patient/representative(s) expressed understanding.    - Discussed:     - Discussed with:  Patient         Postoperative Care    Pain management: Peripheral nerve block (Single Shot).   PONV prophylaxis: Ondansetron (or other 5HT-3), Dexamethasone or Solumedrol, Scopolamine patch, Background Propofol Infusion     Comments:    Other Comments: tiva    Spoke with patient at length regarding general vs spinal anesthesia.  She has had a lower level spinal fusion in past with residual pain/ discomfort.  Patient requests general anesthesia             Evie Hastings

## 2022-01-18 NOTE — ANESTHESIA CARE TRANSFER NOTE
Patient: Leonardo De La Rosa    Procedure: Procedure(s):  RIGHT TOTAL KNEE ARTHROPLASTY       Diagnosis: Osteoarthritis of right knee [M17.11]  Diagnosis Additional Information: No value filed.    Anesthesia Type:   General     Note:    Oropharynx: oropharynx clear of all foreign objects and spontaneously breathing  Level of Consciousness: awake and drowsy  Oxygen Supplementation: face mask  Level of Supplemental Oxygen (L/min / FiO2): 6  Independent Airway: airway patency satisfactory and stable  Dentition: dentition unchanged  Vital Signs Stable: post-procedure vital signs reviewed and stable  Report to RN Given: handoff report given  Patient transferred to: PACU  Comments: At end of procedure, spontaneous respirations, adequate tidal volumes, followed commands to voice, LMA removed atraumatically, oropharynx suctioned, airway patent after LMA removal. Oxygen via facemask at 6 liters per minute to PACU. Oxygen tubing connected to wall O2 in PACU, SpO2, NiBP, and EKG monitors and alarms on and functioning, Manny Hugger warmer connected to patient gown, report on patient's clinical status given to PACU RN, RN questions answered.  Handoff Report: Identifed the Patient, Identified the Reponsible Provider, Reviewed the pertinent medical history, Discussed the surgical course, Reviewed Intra-OP anesthesia mangement and issues during anesthesia, Set expectations for post-procedure period and Allowed opportunity for questions and acknowledgement of understanding      Vitals:  Vitals Value Taken Time   /74 01/18/22 1122   Temp     Pulse 68 01/18/22 1125   Resp 9 01/18/22 1125   SpO2 100 % 01/18/22 1125   Vitals shown include unvalidated device data.    Electronically Signed By: PERICO Peralta CRNA  January 18, 2022  11:26 AM

## 2022-01-18 NOTE — PROGRESS NOTES
01/18/22 1649   Quick Adds   Type of Visit Initial PT Evaluation   Living Environment   People in home spouse   Current Living Arrangements house   Living Environment Comments Pt reports no stairs    Self-Care   Usual Activity Tolerance good   Current Activity Tolerance fair   Equipment Currently Used at Home cane, straight   General Information   Onset of Illness/Injury or Date of Surgery 01/18/22   Referring Physician Yves Li MD   Pertinent History of Current Problem (include personal factors and/or comorbidities that impact the POC) POD 0 R TKA    Weight-Bearing Status - LUE nonweight-bearing  (use of platform walker )   Weight-Bearing Status - RUE weight-bearing as tolerated   Cognition   Orientation Status (Cognition) person;place   Pain Assessment   Patient Currently in Pain Yes, see Vital Sign flowsheet  (High pain levels, received pain meds prior )   Posture    Posture Forward head position   Range of Motion (ROM)   ROM Comment R knee ROM 5-60*    Strength   Strength Comments Pt unable to perform SLR. No buckling with OOB mobility    Bed Mobility   Comment (Bed Mobility) Supine > sitting EOB min A    Transfers   Transfer Safety Comments STS with platform walker, youth walker, youth platform attachment utilized due to NWB LUE. Min A.    Gait/Stairs (Locomotion)   Distance in Feet (Required for LE Total Joints) 3'   Comment (Gait/Stairs) Pt ambulated 3' with platform walker, min A for walker management, step to, no bucling   Balance   Balance Comments Fair dynamic balance with use of platform walker    Clinical Impression   Criteria for Skilled Therapeutic Intervention yes, treatment indicated   PT Diagnosis (PT) impaired IND with mobility from baseline   Influenced by the following impairments pain, impaired functional strength, NWB RUE, high level balance   Functional limitations due to impairments impaired IND with mobility from baseline   Clinical Presentation Stable/Uncomplicated   Clinical  Presentation Rationale Clinical judgement   Clinical Decision Making (Complexity) low complexity   Therapy Frequency (PT) 2x/day   Predicted Duration of Therapy Intervention (days/wks) 3 days   Planned Therapy Interventions (PT) balance training;bed mobility training;gait training;stair training;strengthening;transfer training;home exercise program;patient/family education;home program guidelines;stretching   Risk & Benefits of therapy have been explained care plan/treatment goals reviewed;evaluation/treatment results reviewed;risks/benefits reviewed;patient   PT Discharge Planning    PT Rationale for DC Rec Pt limited by pain, functional weakness. Pt is NWB LUE with use of platform walker. Will need youth walker and youth platform attachment at [place]. Pt is min A for bed mobility. Min A tranfers sand gait with use of platform walker. No RLE buckling. C/o dizziness with OOB mobility, BP HTN. Dtr presente and supportive. Pt unable to tolerate > 3' of gait this PM    Total Evaluation Time   Total Evaluation Time (Minutes) 15

## 2022-01-18 NOTE — ANESTHESIA POSTPROCEDURE EVALUATION
Patient: Leonardo De La Rosa    Procedure: Procedure(s):  RIGHT TOTAL KNEE ARTHROPLASTY       Diagnosis:Osteoarthritis of right knee [M17.11]  Diagnosis Additional Information: No value filed.    Anesthesia Type:  General    Note:  Disposition: Admission   Postop Pain Control: Uneventful            Sign Out: Well controlled pain   PONV: No   Neuro/Psych: Uneventful            Sign Out: Acceptable/Baseline neuro status   Airway/Respiratory: Uneventful            Sign Out: Acceptable/Baseline resp. status   CV/Hemodynamics: Uneventful            Sign Out: Acceptable CV status   Other NRE:    DID A NON-ROUTINE EVENT OCCUR? No           Last vitals:  Vitals Value Taken Time   /76 01/18/22 1240   Temp 36.6  C (97.9  F) 01/18/22 1234   Pulse 71 01/18/22 1244   Resp 24 01/18/22 1244   SpO2 95 % 01/18/22 1244   Vitals shown include unvalidated device data.    Electronically Signed By: Evie Hastings  January 18, 2022  12:45 PM

## 2022-01-18 NOTE — OP NOTE
Procedure Date: 01/18/2022    PREOPERATIVE DIAGNOSIS:  Right knee advanced osteoarthritis.    POSTOPERATIVE DIAGNOSIS:  Right knee advanced osteoarthritis.    PROCEDURE PERFORMED:  Right total knee arthroplasty.    SURGEON:  Yves Li MD    ASSISTANT:  Twila Castorena PA-C    ANESTHESIA:  General.    ESTIMATED BLOOD LOSS:  200 mL.    IMPLANTS:  [employer] system:  1.  Size 4 narrow right cobalt chrome PS femoral component.  2.  Size 3 right standard tibial baseplate.  3.  Size 10, 3/4 high flex PS polyethylene.  4.  Size 29 concentric polyethylene patella.    DESCRIPTION OF PROCEDURE:  Dylan was brought to the operating room 01/18/2022 for right total knee arthroplasty.  Seen day of surgery in the preoperative holding area and the right knee was marked as the correct operative site.  Received a dose of IV antibiotic within 30 minutes prior to surgical incision.  Post-surgical antibiotic and DVT prophylaxis are indicated and will be prescribed.  Skilled assistant was used for positioning, draping, retraction, closure, dressing application.  Received an adductor canal nerve block for postsurgical pain management.  Received a dose of tranexamic acid.  Skilled assistant was used for positioning, draping, retraction, closure, dressing application.  The patient was brought to the operating room and placed under general anesthesia.  Positioned supine.  The right lower extremity was prepped and draped in the standard sterile fashion.  Preoperative timeout was taken, and tourniquet inflated to 250 mmHg.  The patient did not take her blood pressure medication day of surgery and her blood pressure was running high as we started the case.  As we started the case, it became clear that we were dealing with a venous tourniquet potentially to some degree related to her hypertension.  I deflated the tourniquet and did a good percentage of the procedure without the tourniquet up, achieving hemostasis as we did  the work with electrocautery.  The Anesthesia staff work systematically to bring her blood pressure down.  Anterior longitudinal surgical incision was made.  A medial parapatellar arthrotomy performed.  Advanced tricompartmental osteoarthritis was confirmed.  Marginal osteophytes debrided.  Patella was cut to a thickness of 13 mm and sized to a 29.  The femur was prepared with an intramedullary guide herminia and a 5-degree valgus cutting guide, taking an extra 2 mm of distal femur.  The cut distal femur was sized to a 4 narrow and prepared in the appropriate rotational alignment.  Box cut for the PS component was made.  The tibia was then prepared using an extramedullary cutting guide, taking 2 mm of bone and cartilage off the medial side.  The cut proximal tibia was sized to a 3 and punched in the appropriate rotational alignment centered on the medial third of the tibial tubercle.  Posterior osteophytes and menisci were removed.  Posterior capsule injected with a cocktail of Toradol, ropivacaine and saline.  With trial components in place and a size 10 trial PS polyethylene, I was pleased with range of motion gap balancing and stability.  At this point, tourniquet was reinflated after Esmarch bandage exsanguination.  Better function of the tourniquet was appreciated for cementing.  Trial components removed.  Jet lavage irrigation performed.  Components listed above were cemented into place using 1 batch of high viscosity Simplex cement.  Once the cement had hardened and all extruded cement removed, we implanted the size 10 PS polyethylene for the 3 baseplate.  Tourniquet deflated.  Hemostasis achieved with electrocautery.  Betadine wash performed.  Jet lavage irrigation performed.  The arthrotomy was closed over a medium Hemovac drain using interrupted Ethibond sutures.  Superficial soft tissues irrigated and closed in layers and a sterile dressing was applied.  The patient was brought to recovery in stable condition.   Needle and lap counts were correct at the end of the case.    ESTIMATED BLOOD LOSS:  200 mL.    She will be mobilized weightbearing as tolerated with Physical Therapy today and advance to the Orthopedic floor when she meets criteria.  We will monitor her blood pressure closely and I will consult the Hospitalist Service if there are any concerns.    Yves Li MD        D: 2022   T: 2022   MT: [id number]    Name:     BIBI OLVERA  MRN:      [id number]        Account:        [id number]   :      1945           Procedure Date: 2022     Document: [id number]

## 2022-01-18 NOTE — PLAN OF CARE
Patient vital signs are at baseline: yes  Patient able to ambulate as they were prior to admission or with assist devices provided by therapies during their stay: no  Patient MUST void prior to discharge:  No  Patient able to tolerate oral intake:  Yes  Pain has adequate pain control using Oral analgesics: No  Pt go up with therapy but got dizzy, so back to bed. Pt could not urinate, bladder scanned for 822, straight cath for 850ml at 1730.

## 2022-01-18 NOTE — ANESTHESIA PROCEDURE NOTES
Adductor canal Procedure Note    Pre-Procedure   Staff -        Anesthesiologist:  Evie Hastings       Performed By: anesthesiologist       Location: pre-op       Pre-Anesthestic Checklist: patient identified, IV checked, site marked, risks and benefits discussed, informed consent, monitors and equipment checked, pre-op evaluation, at physician/surgeon's request and post-op pain management  Timeout:       Correct Patient: Yes        Correct Procedure: Yes        Correct Site: Yes        Correct Position: Yes        Correct Laterality: Yes        Site Marked: Yes  Procedure Documentation  Procedure: Adductor canal       Laterality: right       Patient Position: supine       Skin prep: Chloraprep       Local skin infiltrated with 3 mL of 1% lidocaine.        Needle Type: insulated and short bevel       Needle Gauge: 21.        Needle Length (millimeters): 100        Ultrasound guided       1. Ultrasound was used to identify targeted nerve, plexus, vascular marker, or fascial plane and place a needle adjacent to it in real-time.       2. Ultrasound was used to visualize the spread of anesthetic in close proximity to the above referenced structure.       3. A permanent image is entered into the patient's record.       4. The visualized anatomic structures appeared normal.       5. There were no apparent abnormal pathologic findings.    Assessment/Narrative         The placement was negative for: blood aspirated, painful injection and site bleeding       Paresthesias: No.       Insertion/Infusion Method: Single Shot       Complications: none       Injection made incrementally with aspirations every 3 mL.    Medication(s) Administered   Bupivacaine 0.5% w/ 1:400K Epi (Injection), 15 mL   Comments:  Needle tip visualized throughout.  No nerve swelling.     Patient tolerated procedure well.  No known complications.     The surgeon has given a verbal order transferring care of this patient to me for the  performance of a regional analgesia block for post-op pain control. It is requested of me because I am uniquely trained and qualified to perform this block and the surgeon is neither trained nor qualified to perform this procedure.

## 2022-01-19 ENCOUNTER — APPOINTMENT (OUTPATIENT)
Dept: PHYSICAL THERAPY | Facility: CLINIC | Age: 77
End: 2022-01-19
Attending: ORTHOPAEDIC SURGERY
Payer: MEDICARE

## 2022-01-19 ENCOUNTER — APPOINTMENT (OUTPATIENT)
Dept: OCCUPATIONAL THERAPY | Facility: CLINIC | Age: 77
End: 2022-01-19
Attending: ORTHOPAEDIC SURGERY
Payer: MEDICARE

## 2022-01-19 VITALS
OXYGEN SATURATION: 96 % | TEMPERATURE: 98 F | WEIGHT: 143 LBS | RESPIRATION RATE: 16 BRPM | SYSTOLIC BLOOD PRESSURE: 146 MMHG | HEART RATE: 78 BPM | DIASTOLIC BLOOD PRESSURE: 69 MMHG | HEIGHT: 64 IN | BODY MASS INDEX: 24.41 KG/M2

## 2022-01-19 LAB
CREAT SERPL-MCNC: 0.52 MG/DL (ref 0.52–1.04)
FASTING STATUS PATIENT QL REPORTED: NO
GFR SERPL CREATININE-BSD FRML MDRD: >90 ML/MIN/1.73M2
GLUCOSE BLD-MCNC: 89 MG/DL (ref 70–99)
HGB BLD-MCNC: 8.9 G/DL (ref 11.7–15.7)

## 2022-01-19 PROCEDURE — 97530 THERAPEUTIC ACTIVITIES: CPT | Mod: GP

## 2022-01-19 PROCEDURE — 82565 ASSAY OF CREATININE: CPT | Performed by: ORTHOPAEDIC SURGERY

## 2022-01-19 PROCEDURE — 250N000011 HC RX IP 250 OP 636: Performed by: ORTHOPAEDIC SURGERY

## 2022-01-19 PROCEDURE — 82947 ASSAY GLUCOSE BLOOD QUANT: CPT | Performed by: ORTHOPAEDIC SURGERY

## 2022-01-19 PROCEDURE — 36415 COLL VENOUS BLD VENIPUNCTURE: CPT | Performed by: ORTHOPAEDIC SURGERY

## 2022-01-19 PROCEDURE — 97116 GAIT TRAINING THERAPY: CPT | Mod: GP

## 2022-01-19 PROCEDURE — 97166 OT EVAL MOD COMPLEX 45 MIN: CPT | Mod: GO | Performed by: OCCUPATIONAL THERAPIST

## 2022-01-19 PROCEDURE — 97535 SELF CARE MNGMENT TRAINING: CPT | Mod: GO | Performed by: OCCUPATIONAL THERAPIST

## 2022-01-19 PROCEDURE — 85018 HEMOGLOBIN: CPT | Performed by: ORTHOPAEDIC SURGERY

## 2022-01-19 PROCEDURE — 250N000013 HC RX MED GY IP 250 OP 250 PS 637: Performed by: ORTHOPAEDIC SURGERY

## 2022-01-19 PROCEDURE — 97110 THERAPEUTIC EXERCISES: CPT | Mod: GP

## 2022-01-19 RX ADMIN — POLYETHYLENE GLYCOL 3350 17 G: 17 POWDER, FOR SOLUTION ORAL at 08:05

## 2022-01-19 RX ADMIN — DULOXETINE 60 MG: 60 CAPSULE, DELAYED RELEASE ORAL at 08:04

## 2022-01-19 RX ADMIN — HYDROMORPHONE HYDROCHLORIDE 2 MG: 2 TABLET ORAL at 11:49

## 2022-01-19 RX ADMIN — LEVOCETIRIZINE DIHYDROCHLORIDE 5 MG: 5 TABLET ORAL at 08:04

## 2022-01-19 RX ADMIN — ACETAMINOPHEN 975 MG: 325 TABLET, FILM COATED ORAL at 13:03

## 2022-01-19 RX ADMIN — FERROUS SULFATE TAB 325 MG (65 MG ELEMENTAL FE) 325 MG: 325 (65 FE) TAB at 08:04

## 2022-01-19 RX ADMIN — ASPIRIN 325 MG: 325 TABLET, COATED ORAL at 08:05

## 2022-01-19 RX ADMIN — ACETAMINOPHEN 975 MG: 325 TABLET, FILM COATED ORAL at 05:14

## 2022-01-19 RX ADMIN — SENNOSIDES AND DOCUSATE SODIUM 1 TABLET: 50; 8.6 TABLET ORAL at 08:05

## 2022-01-19 RX ADMIN — HYDROMORPHONE HYDROCHLORIDE 2 MG: 2 TABLET ORAL at 08:03

## 2022-01-19 RX ADMIN — HYDROMORPHONE HYDROCHLORIDE 2 MG: 2 TABLET ORAL at 14:59

## 2022-01-19 RX ADMIN — CELECOXIB 100 MG: 100 CAPSULE ORAL at 08:05

## 2022-01-19 RX ADMIN — PANTOPRAZOLE SODIUM 40 MG: 40 TABLET, DELAYED RELEASE ORAL at 08:04

## 2022-01-19 RX ADMIN — LOSARTAN POTASSIUM 100 MG: 100 TABLET, FILM COATED ORAL at 08:05

## 2022-01-19 RX ADMIN — PREGABALIN 75 MG: 50 CAPSULE ORAL at 08:04

## 2022-01-19 RX ADMIN — CEFAZOLIN 1 G: 1 INJECTION, POWDER, FOR SOLUTION INTRAMUSCULAR; INTRAVENOUS at 00:29

## 2022-01-19 RX ADMIN — PRAVASTATIN SODIUM 20 MG: 20 TABLET ORAL at 08:04

## 2022-01-19 RX ADMIN — HYDROMORPHONE HYDROCHLORIDE 2 MG: 2 TABLET ORAL at 04:32

## 2022-01-19 RX ADMIN — HYDROMORPHONE HYDROCHLORIDE 0.2 MG: 0.2 INJECTION, SOLUTION INTRAMUSCULAR; INTRAVENOUS; SUBCUTANEOUS at 00:33

## 2022-01-19 NOTE — PLAN OF CARE
Occupational Therapy Discharge Summary    Reason for therapy discharge:    Discharged to home with home therapy.    Progress towards therapy goal(s). See goals on Care Plan in [facility] electronic health record for goal details.  Goals not met.  Barriers to achieving goals:   discharge from facility.    Therapy recommendation(s):    Continued therapy is recommended.  Rationale/Recommendations:  Recommend Home OT for home safety eval .

## 2022-01-19 NOTE — PROGRESS NOTES
Patient vital signs are at baseline: Yes  Patient able to ambulate as they were prior to admission or with assist devices provided by therapies during their stay:  Yes  Patient MUST void prior to discharge:  Yes  Patient able to tolerate oral intake:  Yes  Pain has adequate pain control using Oral analgesics:  Yes   Vomited at the start of the shift. Was able to give Zofran and compazine and she was fine since then.  Call for pain medications. Daughter Lucie called for update saying to keep an eye on her mom as she had alzheimer's.  A1 to BSC with good output.  To continue to  Monitor,

## 2022-01-19 NOTE — DISCHARGE INSTRUCTIONS
Remember your upcoming appointments,   [facility] system (we can't see the appointment times):   Date Type Specialty Care Team Description   02/07/2022 Office Visit Internal Medicine Cassie Ortega MD    [facility]    [place] [address]    [phone] (Work)    [phone] (Fax)   Primary Care Provider     [facility] system:   Mar 03, 2022  9:30 AM   New Visit with Zechariah Elmore MD - Rheumatology   [facility] [place]  [address] [place] [address] [address]   [facility] [id number] [phone]      Mar 16, 2022  1:15 PM   (Arrive by 1:00 PM)   Video Visit with Augustin Terry MD - Neurology   [facility] [address]   [facility] [address] [phone]   Please Note: This is a virtual visit; there is no need to come to the facility.      May 06, 2022  1:00 PM   (Arrive by 12:45 PM)   New Visit with Zechariah Elmore MD   [facility] [address] [id number] [phone]           Some additional resources:     Web: https://helpatyourdoor.org/   Phone: 812.270.8052   Help At Your Door is a nonprofit serving seniors and individuals with disabilities across [place].  Our grocery assistance, home support and transportation services provide help with in-home tasks and chores.      Web: https://seniorcommunity.org/services/  Phone: (987) 253-7995              If your family would like extra support, here is one great resource:       [employer], call [phone])   <---WARREN, this would be a great program to enroll in for help with ideas to get extra support taking care of your  and also to help you, it is a dedicated specialized care coordinator (you would talk to the same person every time you call)      Customer service hours: Monday - Saturday, 9 a.m. - 7 p.m.      Other resources:  "    WARREN---Senior Linkage Line also connects you with a care coordinator, but you would get a different one each time you call         If you want to pursue private duty / extended hours home health with [facility], here is some information:   (otherwise work with your [facility] home health nurse to see if they can provide additional resources)     [facility] - private duty rates  Call Bang MORIN 8am-4pm [phone] to arrange Home Health Aide via [facility] [facility]  *prefer 48 hours notice to set up care    - $35/hr 4 hour \"minimum\" each visit to get this rate  - $90 for 2 hour \"short visit\"  - $50 for 45 minute \"bath visit\" (any care can be provided during the time)  All require RN assessment admission fee of $150; however this fee is waived if using more than 16 hours per week.  "

## 2022-01-19 NOTE — PROGRESS NOTES
VSS, CMS intact. Up with 1 and walker. Pain managed with dilaudid and tylenol. AVS reviewed with teach back. Dressing changed, incision looks good. Discharge medications and instruction sheets given. A&OX4.

## 2022-01-19 NOTE — PROGRESS NOTES
Ortho  S/p Right TKA, POD#1    Increased pain overnight as block wore off, improving this am  Denies CP, SOB, nausea, fever/chills  NAD, A&O, conversant  Right knee - dry dressing, hemovac removed post op  Calves soft, NT  CMS intact  Hgb - P    Plan:  PT/OT, WBAT with platform walker (recent left wrist surgery)  ASA for DVT prophylaxis  Home with home PT, RN when passes therapies  Reviewed discharge instructions with patient    Twila Castorena PA-C  6:36 AM

## 2022-01-19 NOTE — CONSULTS
"Question Answer   Reason for Consult Discharge Planning/Disposition   Comments  Home with home RN and PT     Care Management Initial Consult    General Information  Assessment completed with: Patient, Kurt  Type of CM/SW Visit: Initial Assessment  Primary Care Provider verified and updated as needed: Yes (Dr. Cassie Ortega [phone] )   Readmission within the last 30 days: no previous admission in last 30 days      Reason for Consult: discharge planning  Advance Care Planning:    Health Care Directive dated 11- on file in EPIC     Communication Assessment  Patient's communication style: spoken language (English or Bilingual)    Hearing Difficulty or Deaf: yes   Wear Glasses or Blind: no    Cognitive  Cognitive/Neuro/Behavioral: WDL                      Living Environment:   People in home: spouse  Chetan [phone] ( has dementia & H/O intracranial hemorrhage)  Current living Arrangements: house      Able to return to prior arrangements: yes  Living Arrangement Comments: single level home; per therapy note \"Pt reports all needs met on main floor, walk-in shower with grab bars, toilet with grab bars on either side. Pt reports she has tried RTS but it did not fit over her toilet \"     Family/Social Support:  Care provided by: self  Provides care for: spouse  Marital Status:   Support system: Children (Lucie moved out due to family dynamics, but still helps out)        Description of Support System: Supportive,Involved    Support Assessment: Other (see comments) (daughter planning to stay with pt until Saturday)    Current Resources:   Patient receiving home care services:   no  Community Resources:  None but has been encouraged to utilize [facility] to find respite services for care of her spouse   Equipment currently used at home: cane, straight,grab bar, tub/shower,grab bar, toilet  Supplies currently used at home:  n/a    Employment/Financial:  Employment Status:     Retired " "  Financial Concerns:   None  Comments: active MEDICARE/MEDICARE and BCBS[place]BS [place] insurance    Lifestyle & Psychosocial Needs:  Outpatient \"Social Determinants of Health\" assessment not done     Functional Status:  Prior to admission patient needed assistance: see therapy notes      Mental Health Status:  Mental Health Status: Current Concern (mild Unspecified neurocog disorder w/adjustment disorder )  Mental Health Management: Psychiatrist (ERUM Rivera vs Daniel Sheikh)    Chemical Dependency Status:  Chemical Dependency Status: No Current Concerns           Values/Beliefs:  Spiritual, Cultural Beliefs, Oriental orthodox Practices, Values that affect care:       Muslim        Additional Information:  Consult noted, placed call to pt room. She expects to discharge today. She is in agreement with recommendations for home with homecare. Her spouse uses [facility], she would appreciate a referral sent to this agency as well (referral faxed). She asks about other resources for help at home; CM-RN added resources to AVS under \"additional instructions\" and explained extra help (outside of the skilled RN & PT services that can be billed to insurance) are likely to be out of pocket expenses; pt verbalizes understanding and appreciation for the information.  Will spend more time 1:1 with pt reviewing the resources later this morning.     Once the home care agency has confirmed ability to take this pt, I will add their contact information to the AVS.    Per chart review pt has a couple other specialty provider appointments scheduled with [facility]  Mar 03, 2022  9:30 AM   New Visit with Zechariah Elmore MD - Rheumatology   [facility] [address] [address]   [phone]      Mar 16, 2022  1:15 PM   (Arrive by 1:00 PM)   Video Visit with Augustin Terry MD - Neurology   [facility] [address] "   [facility] [address]   [phone]   Please Note: This is a virtual visit; there is no need to come to the facility.      May 06, 2022  1:00 PM   (Arrive by 12:45 PM)   New Visit with Zechariah Elmore MD   [facility] [address] [id number] [phone]     And an upcoming PCP followup in the [facility]:   Date Type Specialty Care Team Description   02/07/2022 Office Visit Internal Medicine Cassie Ortega MD    [address]    [place] [address]    [phone] (Work)    [phone] (Fax)   Primary Care Provider       Received callback from [facility]; they state pt is already open to home health services with them and they will definitely be continuing care in the future.  Will add contact information to discharge papers for pt reference.      Your doctor has ordered home care to help you after your hospital stay.  They will contact you regarding your first visit.  This service will be provided by [facility].  If you have not received a call within 48 hours of discharge, please call them at [phone] or [phone].  Their fax number is [phone].    Dorothy Bond RN, BSN, PHN  [facility]  Inpatient Care Management - FLOAT  Mobile: [phone] 01/19/22 until 4pm  (after today's date, please call the patient's unit)

## 2022-01-19 NOTE — PROGRESS NOTES
01/19/22 0824   Quick Adds   Type of Visit Initial Occupational Therapy Evaluation   Living Environment   People in home spouse   Current Living Arrangements house   Number of Stairs, Within Home, Primary 4   Stair Railings, Within Home, Primary railings safe and in good condition;railings on both sides of stairs   Living Environment Comments Pt reports all needs met on main floor, walk-in shower with grab bars, toilet with grab bars on either side. Pt reports she has tried RTS but it did not fit over her toilet    Self-Care   Usual Activity Tolerance good   Current Activity Tolerance fair   Equipment Currently Used at Home cane, straight;grab bar, tub/shower;grab bar, toilet   Activity/Exercise/Self-Care Comment Pt reports IND with I/ADLs, uses [employer]. Pt reports using cane when her pain was bad. Pt's spouse has had a stroke and uses FWW for mobility and pt assists him with I/ADLs, daughter lives nearby and come over often   Disability/Function   Walking or Climbing Stairs Difficulty yes   Dressing/Bathing Difficulty no   Toileting issues no   Doing Errands Independently Difficulty (such as shopping) no   Fall history within last six months yes   Number of times patient has fallen within last six months 2   General Information   Onset of Illness/Injury or Date of Surgery 01/18/22   Referring Physician Yves Li MD   Additional Occupational Profile Info/Pertinent History of Current Problem POD #1 R TKA, recent L wrist surgery, NWB through wrist   Existing Precautions/Restrictions fall;weight bearing   Left Upper Extremity (Weight-bearing Status) non weight-bearing (NWB)  (through wrist- pt able to use platform FWW)   Right Lower Extremity (Weight-bearing Status) weight-bearing as tolerated (WBAT)   General Observations and Info Vital signs monitored during session, stable   Cognitive Status Examination   Orientation Status orientation to person, place and time   Affect/Mental Status (Cognitive) WFL    Follows Commands WFL   Cognitive Status Comments Per RN note, pt's daughter noting  pt has Alzheimers   Visual Perception   Visual Impairment/Limitations WFL   Sensory   Sensory Quick Adds No deficits were identified   Pain Assessment   Patient Currently in Pain Yes, see Vital Sign flowsheet  (6/10)   Integumentary/Edema   Integumentary/Edema no deficits were identifed   Posture   Posture not impaired   Range of Motion Comprehensive   Comment, General Range of Motion R LE limited secondary to R TKA and pain   Strength Comprehensive (MMT)   Comment, General Manual Muscle Testing (MMT) Assessment R LE limited, pt able to bear weight using platform FWW for ambulation   Muscle Tone Assessment   Muscle Tone Quick Adds No deficits were identified   Coordination   Upper Extremity Coordination No deficits were identified   Bed Mobility   Bed Mobility supine-sit   Supine-Sit [place] (Bed Mobility) supervision   Assistive Device (Bed Mobility) bed rails   Transfers   Transfers sit-stand transfer   Sit-Stand Transfer   Sit-Stand [place] (Transfers) minimum assist (75% patient effort);verbal cues   Assistive Device (Sit-Stand Transfers) walker, platform   Balance   Balance Comments Pt unsteady, requiring min A for ambulation including assist to advance platform FWW   Activities of Daily Living   BADL Assessment lower body dressing;toileting   Lower Body Dressing Assessment   [place] Level (Lower Body Dressing) moderate assist (50% patient effort)   Toileting   [place] Level (Toileting) moderate assist (50% patient effort)   Assistive Devices (Toileting) commode chair;grab bar, toilet   Comment (Toileting) Unable to assess toilet transfer as pt unable to ambulate distance to toilet due to pain   Clinical Impression   Criteria for Skilled Therapeutic Interventions Met (OT) yes;meets criteria;skilled treatment is necessary   OT Diagnosis Impaired independence with functional mobility and I/ADLs   OT Problem  List-Impairments impacting ADL activity tolerance impaired;balance;mobility;strength;pain;range of motion (ROM);cognition   Assessment of Occupational Performance 5 or more Performance Deficits   Identified Performance Deficits functional mobility, toileting, dressing, bathing, household I/ADLs   Planned Therapy Interventions (OT) ADL retraining;transfer training   Clinical Decision Making Complexity (OT) moderate complexity   Therapy Frequency (OT) Daily   Predicted Duration of Therapy 2 days   Anticipated Equipment Needs Upon Discharge (OT) commode chair   Risk & Benefits of therapy have been explained evaluation/treatment results reviewed;care plan/treatment goals reviewed;risks/benefits reviewed;current/potential barriers reviewed;participants voiced agreement with care plan;participants included;patient   OT Discharge Planning    OT Rationale for DC Rec Pt limited by pain, weakness, and L wrist NWB status. Pt SBA for bed mobility, needing min A for transfer and min A for short distance ambulation with platform FWW. Pt reporting her  has had a stroke and is unable to physically assist her. Pt's daughter will be staying with her until Saturday. At this time, pt is requiring assist for all mobility. Pt has several pieces of adaptive equipment at home. Recommend commode chair for toilet to assist with transfers   OT Brief overview of current status  SBA bed mobility, Min A Sit>stand with platform FWW, pt needing assist to stabilize walker. Min A for ambulation with platform FWW, pt needing assist to advance walker   Therapy Certification   Start of Care Date 01/19/22   Certification date from 01/19/22   Certification date to 01/22/22   Medical Diagnosis R TKA   Total Evaluation Time (Minutes)   Total Evaluation Time (Minutes) 10

## 2022-01-19 NOTE — PROGRESS NOTES
Care Management Discharge Note    Discharge Date: 01/19/2022  Discharge Disposition: Home,Home Care via [facility]  Discharge Services: Other (see comment) (daughter to stay with pt)  Discharge DME: None (has equipment)  Discharge Transportation: family or friend will provide  Private pay costs discussed: reviewed extended hours home health costs are private pay, gave resources for arranging   PAS Confirmation Code:  (n/a)  Patient/family educated on Medicare website which has current facility and service quality ratings: yes  Education Provided on the Discharge Plan:  Updated AVS   Persons Notified of Discharge Plans: Home Care Agency, Charge RN   Patient/Family in Agreement with the Plan:  Yes  Handoff Referral Completed: Yes    Additional Information:  [facility] has confirmed they will continue as pt's home care agency; [facility] contact information and additional resources added to AVS.  Pt can discharge home with assist from daughter.  [facility] notes they arealready familiar with pt and her spouse and provide social service support at home. Orders and notes faxed to intake.    Dorothy Bond RN, BSN, PHN  [facility]  Inpatient Care Management - FLOAT  Mobile: [phone] 01/19/22 until 4pm  (after today's date, please call the patient's unit)

## 2022-01-19 NOTE — PLAN OF CARE
Physical Therapy Discharge Summary    Reason for therapy discharge:    Discharged to home with home therapy.    Progress towards therapy goal(s). See goals on Care Plan in [facility] electronic health record for goal details.  Goals partially met.  Barriers to achieving goals:   discharge from facility.    Therapy recommendation(s):    Continued therapy is recommended.  Rationale/Recommendations:  Defer to ortho team for DC recommendations.

## 2022-01-20 NOTE — DISCHARGE SUMMARY
Discharge Summary    Leonardo De La Rosa MRN# [id number]   YOB: 1945 Age: 76 year old     Date of Admission:  1/18/2022  Date of Discharge:  1/19/22  Admitting Physician:  Yves Li MD  Discharge Physician:  Yves Li MD     Primary Provider: Luis Ortega          Admission Diagnoses:   Osteoarthritis right knee          Discharge Diagnosis:   Same           Surgical Procedure:   Total knee arthroplasty           Discharge Disposition:   Discharged to home           Medications Prior to Admission:     No medications prior to admission.             Discharge Medications:     Discharge Medication List as of 1/19/2022  2:39 PM      START taking these medications    Details   aspirin (ASA) 325 MG EC tablet Take 1 tablet (325 mg) by mouth daily, Disp-30 tablet, R-0, E-Prescribe      HYDROmorphone (DILAUDID) 2 MG tablet Take 1 tablet (2 mg) by mouth every 4 hours as needed for moderate to severe pain, Disp-40 tablet, R-0, Local Print      senna-docusate (SENOKOT-S/PERICOLACE) 8.6-50 MG tablet Take 1-2 tablets by mouth 2 times daily Take while on oral narcotics to prevent or treat constipation., Disp-30 tablet, R-0, E-PrescribeWhile taking narcotics         CONTINUE these medications which have CHANGED    Details   acetaminophen (TYLENOL) 325 MG tablet Take 2 tablets (650 mg) by mouth every 4 hours as needed for other (mild pain), Disp-100 tablet, R-0, E-Prescribe         CONTINUE these medications which have NOT CHANGED    Details   ammonium lactate (AMLACTIN) 12 % external cream Apply topically daily as needed for dry skinHistorical      azelastine (ASTELIN) 0.1 % nasal spray Spray 1 spray into both nostrils every morning, Historical      calcium carbonate-vitamin D (CALCIUM + D) 600-200 MG-UNIT TABS Take 1 tablet by mouth daily., Historical      denosumab (PROLIA) 60 MG/ML SOSY injection Inject 60 mg Subcutaneous every 6 months , Historical      donepezil (ARICEPT) 5 MG tablet Take 1  tablet (5 mg) by mouth At Bedtime, Disp-30 tablet, R-3, E-Prescribe      DULoxetine (CYMBALTA) 60 MG capsule Take 60 mg by mouth daily , Historical      famotidine (PEPCID) 40 MG tablet Take 20 mg by mouth every evening , Historical      ferrous sulfate (FEROSUL) 325 (65 Fe) MG tablet Take 325 mg by mouth every morning, Historical      fluocin-hydroquinone-tretinoin (TRI-RODNEY) 0.01-4-0.05 % CREA Externally apply topically At BedtimeDisp-30 g, R-3Local Print      fluticasone (FLONASE) 50 MCG/ACT nasal spray INSTILL 1 SPRAY INTO EACH NOSTRIL ONCE DAILY AS NEEDED FOR RHINITIS, Historical      glucosamine-chondroitin 500-400 MG CAPS Take 1 capsule by mouth daily., Historical      levocetirizine (XYZAL) 5 MG tablet Take 5 mg by mouth every morning, Historical      losartan (COZAAR) 100 MG tablet Take 100 mg by mouth daily , Historical      methocarbamol (ROBAXIN) 500 MG tablet TAKE 1 TABLET BY MOUTH 3 TIMES A DAY, Historical      multivitamin, therapeutic with minerals (MULTI-VITAMIN) TABS Take 1 tablet by mouth daily., Historical      Omega-3 Fatty Acids (OMEGA-3 FISH OIL) 1200 MG CAPS Take 1,200 g by mouth every 7 days on Friday, Historical      omeprazole (PRILOSEC) 20 MG DR capsule Take 20 mg by mouth every morning , Historical      pravastatin (PRAVACHOL) 20 MG tablet Take 20 mg by mouth daily , Historical      !! pregabalin (LYRICA) 100 MG capsule Take 100 mg by mouth 2 times daily , Historical      !! pregabalin (LYRICA) 75 MG capsule Take 75 mg by mouth 2 times daily, Historical      Vitamin D3 (VITAMIN D3) 25 mcg (1000 units) tablet Take 1,000 Units by mouth daily , Historical      zolpidem (AMBIEN) 5 MG tablet Take 5 mg by mouth At Bedtime , Historical       !! - Potential duplicate medications found. Please discuss with provider.      STOP taking these medications       celecoxib (CELEBREX) 200 MG capsule Comments:   Reason for Stopping:                     Consultations:   No consultations were requested  during this admission           Hospital Course:   The patient was admitted after the surgical procedure.The patient underwent an uneventful total knee arthroplasty. Postoperatively, anticoagulation with aspirin was initiated. Home medications have been reconciled. Dilaudid 2 mg was prescribed for pain.             Discharge Instructions and Follow-Up:        Discharge activity: WBAT with ambulatory device.   Discharge follow-up: Follow-up with Twila Castorena PA-C in ~14 days post-op. [phone]   Outpatient therapy: Should already be arranged by office.  If not, patient should call to schedule 2x/week x 3 weeks.   Home Care agency: Home RN and PT   Supplies and equipment: Platform walker        Wound care: Daily dry dressing changes.  May use adaptic/xeroform directly over incision if available.  Staples out 12 days post-op and apply steri-strips.    Other instructions: Knee immobilizer on at night for 1 month.  Please discharge patient with immobilizer.       Twila Castorena PA-C

## 2022-02-02 NOTE — PLAN OF CARE
[facility]      OUTPATIENT OCCUPATIONAL THERAPY  EVALUATION  PLAN OF TREATMENT FOR OUTPATIENT REHABILITATION  (COMPLETE FOR INITIAL CLAIMS ONLY)  Patient's Last Name, First Name, M.I.  YOB: 1945  Leonardo De La Rosa                          Provider's Name  [facility] Medical Record No.  [id number]                               Onset Date:  01/18/22   Start of Care Date:  01/19/22     Type:     ___PT   _X_OT   ___SLP Medical Diagnosis:  R TKA                        OT Diagnosis:  Impaired independence with functional mobility and I/ADLs   Visits from SOC:  1   _________________________________________________________________________________  Plan of Treatment/Functional Goals    Planned Interventions: ADL retraining,transfer training   Goals: See Occupational Therapy Goals on Care Plan in [facility] electronic health record.    Therapy Frequency: Daily  Predicted Duration of Therapy Intervention: 2 days  _________________________________________________________________________________    I CERTIFY THE NEED FOR THESE SERVICES FURNISHED UNDER        THIS PLAN OF TREATMENT AND WHILE UNDER MY CARE     (Physician co-signature of this document indicates review and certification of the therapy plan).                Certification date from: 01/19/22, Certification date to: 01/22/22    Referring Physician: Yves Li MD            Initial Assessment        See Occupational Therapy evaluation dated 01/19/22 in Epic electronic health record.

## 2022-02-17 ENCOUNTER — TRANSFERRED RECORDS (OUTPATIENT)
Dept: HEALTH INFORMATION MANAGEMENT | Facility: CLINIC | Age: 77
End: 2022-02-17

## 2022-02-20 ENCOUNTER — HEALTH MAINTENANCE LETTER (OUTPATIENT)
Age: 77
End: 2022-02-20

## 2022-02-28 NOTE — PROGRESS NOTES
Rheumatology Clinic Visit  [facility]  Zechariah Elmore M.D.     Leonardo De La Rosa MRN# [id number]   YOB: 1945 Age: 76 year old   Date of Visit: 3-3-2022  Primary care provider: Cassie Ortega          Assessment and Plan:   # Polyarthralgia:  Patient reports multiple areas of chronic joint pain, including wrist swelling/pain. There is significant early morning stiffness in many joints. Physical exam shows non-tender swelling along the dorsum of the left wrist. Blood work on January 19 2022 showed creatinine and glucose normal; hemoglobin was decreased at 8.9. On January 12, 2022, sodium was low at 131, slightly improved from values noted in July and June 2021.  Creatinine was 0.71.  Ferritin was 81.2. In November 2021, sedimentation rate was 27 normal and C-reactive protein was mildly elevated at 2.15. SPEP was normal on October 21, 2020  In December 2018, antinuclear antibody was negative; rheumatoid factor and CCP antibodies were negative earlier.  Imaging: Plain x-ray of the right knee showed status post right total knee arthroplasty with patellar resurfacing. MRI of the left wrist showed progressive tenosynovitis in multiple flexor and extensor tendons.    Given operative findings of synovitis/tenosynovitis during recent wrist surgery, as well as imaging findings of tenosynovitis, a working diagnosis of inflammatory polyarthritis is supported.  Distribution of involved joints suggests a seronegative spondyloarthropathy more than classic rheumatoid arthritis.   Nevertheless, repeat search for cyclic citrullinated peptide antibodies is warranted to better define prognosis. Plan trial of methotrexate for a working diagnosis of seronegative inflammatory arthritis.    # Chronic Neck Pain: patient has severe neck pain, R sided occipital pain with neuritic component. She had temporary relief after 3-2-22 occipital nerve blocks. C-spine MRI did not show evidence of  autoimmune/inflammatory  lesions expected to drive cervicalgia. Agree with the occipital nerve blocks that were performed. Reviewed that it may take more time for full benefit. It is encouraging that she had 6 hours of good pain relief with the anesthetic phase.     # Osteoarthritis, knees and hip  # Rotator cuff tendonitis/impingement syndrome:  # Early dementia: taking aricept    Plan:  1. check anti-CCP and ESR today.   2. Start Methotrexate 2.5mg tablets. Take 2 tablets (test dose) on week 1. If tolerated after 7 days, increase to 6 tablets weekly. Continue 6 tabs every week until followup  While on Methotrexate:  -check labs (ALT/AST, albumin, CBC with platelets and creatinine) every 3 months  -Limit alcohol to 2 drinks weekly  -Take Folic Acid 1mg daily   -Tylenol 500-1000mg can be used as needed up to three times daily for nausea/headache associated with dosing  -this medication works gradually to relieve inflammatory symptoms at the joints; expect maximum benefit at prescribed dose by 4 weeks.  3. Continue local injections, pain medications, topical salves, heat as per Dr. Joya and primary care for neck pain.    I was present with WES Amaral, physician assistant who took the history and acted as a scribe. I have verified the history, reviewed imaging and laboratory data, and personally performed the physical exam. I formulated the assessment and plan.  Working diagnosis is inflammatory polyarthritis accounting for components of peripheral joint pain, but not likely for cervicalgia.  I recommend methotrexate therapy with monthly dose escalation to 25 mg weekly, assuming that medication is well-tolerated.  Expect benefit with reduced stiffness and swelling and pain in peripheral joints within 4 to 6 weeks.  Addition of anti-TNF to methotrexate will be considered for inadequate response to methotrexate alone.    RTC 2 months    Zechariah Elmore M.D.  Staff Rheumatologist, [facility]  Pager [phone]             History of Present  Illness:   Leonardo De La Rosa presents for evaluation wide spread pain and inflammation. She is accompanied today by her daughter.    Initial hx 3-3-2022  She reports pain in her right knee, back (hx of spinal fusion), left hand (surgery x2, hand is tender and hard), left shoulder (she gets cortisone injections periodically, rotator cuff surgery suggested). She reports she fell multiple times, the latest being this last summer causing a broken jaw and she needs dental work. Per daughter, she has had multiple surgeries and has never recovered. Her daughter reports that her sleep is interrupted. She has not noticed swelling in her joints. She reports feeling stiff in her joints. She feels her nights and mornings are worse than during the day. She reports that prior to her surgeries, it would take about 1 hour to get ready for the day. Now it takes about 2, but that is also related to her recent surgeries. She has a lot of neck pain currently. She got occipital nerve blocks yesterday, she had immediate relief for 6 hours, pain has since returned. She has seen [facility] for her neck as well.    She denies any mouth sores, except from biting with the recent dental changes. She states that she will get a continuous itch at times, at the bottom of her legs.     Patient's daughter states that a lot of the symptoms the patient is experiencing is the same as hers. Her sister was also recently diagnosed. Last year, she had the first surgery on her left hand and wasn't a success. It started swelling up and there were no answers. They went to [facility] and she had the surgery re-done in 12/2021. Since surgery, she has some discoloration, that the orthopedist feels is related to scar tissue. The provider said there was a lot of synovitis and suggested there was a rheumatoid component. There have been some components of fibromyalgia as well and she is on Lyrica and Cymbalta. Since being on these, there was an initial improvement, but  then with the hand starting this exacerbated the pain. Things have been rough in the last year and half. She has had a lot of falls and pain. She has a lot of neck pain as well. They saw some one at [facility] and was told it could be rheumatological as well.          Review of Systems:     Constitutional: negative  Skin: negative  Eyes: negative  Ears/Nose/Throat: negative  Respiratory: No shortness of breath, dyspnea on exertion, cough, or hemoptysis  Cardiovascular: negative  Gastrointestinal: negative  Genitourinary: negative  Musculoskeletal: HPI  Neurologic: negative  Psychiatric: negative  Hematologic/Lymphatic/Immunologic: negative  Endocrine: negative         Active Problem List:     Patient Active Problem List    Diagnosis Date Noted     S/P revision of total knee, right 01/18/2022     Priority: Medium     Late onset Alzheimer's dementia without behavioral disturbance (H) 11/16/2021     Priority: Medium     Mild aortic stenosis 11/12/2021     Priority: Medium     HLD (hyperlipidemia) 03/04/2021     Priority: Medium     Benign neoplasm of stomach 01/29/2021     Priority: Medium     Spinal stenosis 10/24/2019     Priority: Medium     Formatting of this note might be different from the original.  Added automatically from request for surgery [id number]  Added automatically from request for surgery [id number]       Gastroesophageal reflux disease 10/15/2018     Priority: Medium     Osteoporosis 11/16/2015     Priority: Medium     Sensorineural hearing loss, bilateral 09/28/2012     Priority: Medium            Past Medical History:     Past Medical History:   Diagnosis Date     Arthritis     Feet Hands and back.     PONV (postoperative nausea and vomiting)     Severe     Postsurgical arthrodesis status 4/23/2013     Status post lumbar spinal fusion 2/24/2020     Past Surgical History:   Procedure Laterality Date     ARTHRODESIS FOOT  4/22/2013    Procedure: ARTHRODESIS FOOT;  Left Second and Third Tarsal metatarsal  Arthrodesis, Bunion Correction, Lapidus Procedure, Weil Osteotomy   ;  Surgeon: Alber Pace MD;  Location: [facility] OR     ARTHROPLASTY KNEE Right 1/18/2022    Procedure: RIGHT TOTAL KNEE ARTHROPLASTY;  Surgeon: Yves Li MD;  Location: [facility] OR     BUNIONECTOMY LAPIDUS WITH TARSAL METATARSAL (TMT) FUSION  4/22/2013    Procedure: BUNIONECTOMY LAPIDUS WITH TARSAL METATARSAL (TMT) FUSION;;  Surgeon: Alber Pace MD;  Location: [facility] OR     FOOT SURGERY      right bunionectomy.     FOOT SURGERY       HYSTERECTOMY       HYSTERECTOMY TOTAL ABDOMINAL, BILATERAL SALPINGO-OOPHORECTOMY, COMBINED       KNEE SURGERY       ZZC TOTAL KNEE ARTHROPLASTY      left     # Osteoarthritis, knees  # Hypertension  # Hyperlipidemia         Social History:     Social History     Socioeconomic History     Marital status:      Spouse name: Not on file     Number of children: Not on file     Years of education: Not on file     Highest education level: Not on file   Occupational History     Not on file   Tobacco Use     Smoking status: Never Smoker     Smokeless tobacco: Never Used   Vaping Use     Vaping Use: Not on file   Substance and Sexual Activity     Alcohol use: No     Drug use: No     Sexual activity: Not on file   Other Topics Concern     Parent/sibling w/ CABG, MI or angioplasty before 65F 55M? Not Asked   Social History Narrative     Not on file     Social Determinants of Health     Financial Resource Strain: Not on file   Food Insecurity: Not on file   Transportation Needs: Not on file   Physical Activity: Not on file   Stress: Not on file   Social Connections: Not on file   Intimate Partner Violence: Not on file   Housing Stability: Not on file          Family History:     Family History   Problem Relation Age of Onset     Coronary Artery Disease Mother    2 daughters with seronegative inflammatory arthritis  1 daughter with fibromyalgia   Mother: suspected of crohn's (never confirmed)          "Allergies:     Allergies   Allergen Reactions     Estratest H.S. Other (See Comments) and Unknown     Other reaction(s): Other (see comments)  Increase lipids  Outside source did not list reaction  Increase lipids       Hydrocodone      Oxycodone      Propofol Nausea and Vomiting     Outside source states \"injectable and inhaled\"  Outside source states \"injectable and inhaled\"  Outside source states \"injectable and inhaled\"  Outside source states \"injectable and inhaled\"       Reglan      Estrogens Rash     Other reaction(s): Other (see comments)  Outside source did not list reaction  Estrogen patches, from patch only, ( Adhesive )  does tolerate estrogen other forms        Penicillins Nausea and Vomiting and Nausea     Other reaction(s): GI intolerance, GI Upset  Patient states upset stomach.  Patient states upset stomach.  Patient states upset stomach.  Patient states upset stomach.              Medications:     Current Outpatient Medications   Medication Sig Dispense Refill     acetaminophen (TYLENOL) 325 MG tablet Take 2 tablets (650 mg) by mouth every 4 hours as needed for other (mild pain) 100 tablet 0     ammonium lactate (AMLACTIN) 12 % external cream Apply topically daily as needed for dry skin       aspirin (ASA) 325 MG EC tablet Take 1 tablet (325 mg) by mouth daily 30 tablet 0     azelastine (ASTELIN) 0.1 % nasal spray Spray 1 spray into both nostrils every morning       calcium carbonate-vitamin D (CALCIUM + D) 600-200 MG-UNIT TABS Take 1 tablet by mouth daily.       denosumab (PROLIA) 60 MG/ML SOSY injection Inject 60 mg Subcutaneous every 6 months        donepezil (ARICEPT) 5 MG tablet Take 1 tablet (5 mg) by mouth At Bedtime (Patient taking differently: Take 5 mg by mouth 2 times daily ) 30 tablet 3     DULoxetine (CYMBALTA) 60 MG capsule Take 60 mg by mouth daily        famotidine (PEPCID) 40 MG tablet Take 20 mg by mouth every evening        ferrous sulfate (FEROSUL) 325 (65 Fe) MG tablet Take 325 " "mg by mouth every morning       fluocin-hydroquinone-tretinoin (TRI-RODNEY) 0.01-4-0.05 % CREA Externally apply topically At Bedtime 30 g 3     fluticasone (FLONASE) 50 MCG/ACT nasal spray INSTILL 1 SPRAY INTO EACH NOSTRIL ONCE DAILY AS NEEDED FOR RHINITIS       folic acid (FOLVITE) 1 MG tablet Take 1 tablet (1 mg) by mouth daily 90 tablet 3     glucosamine-chondroitin 500-400 MG CAPS Take 1 capsule by mouth daily.       HYDROmorphone (DILAUDID) 2 MG tablet Take 1 tablet (2 mg) by mouth every 4 hours as needed for moderate to severe pain 40 tablet 0     levocetirizine (XYZAL) 5 MG tablet Take 5 mg by mouth every morning       losartan (COZAAR) 100 MG tablet Take 100 mg by mouth daily        methocarbamol (ROBAXIN) 500 MG tablet TAKE 1 TABLET BY MOUTH 3 TIMES A DAY       methotrexate 2.5 MG tablet Take 6 tablets (15 mg) by mouth every 7 days Labs every 8 - 12 weeks for refills. 24 tablet 3     multivitamin, therapeutic with minerals (MULTI-VITAMIN) TABS Take 1 tablet by mouth daily.       Omega-3 Fatty Acids (OMEGA-3 FISH OIL) 1200 MG CAPS Take 1,200 g by mouth every 7 days on Friday       omeprazole (PRILOSEC) 20 MG DR capsule Take 20 mg by mouth every morning        pravastatin (PRAVACHOL) 20 MG tablet Take 20 mg by mouth daily        pregabalin (LYRICA) 75 MG capsule Take 75 mg by mouth 2 times daily Patient taking 50 mg in am and 75 mg in pm       Vitamin D3 (VITAMIN D3) 25 mcg (1000 units) tablet Take 1,000 Units by mouth daily        zolpidem (AMBIEN) 5 MG tablet Take 5 mg by mouth At Bedtime        pregabalin (LYRICA) 100 MG capsule Take 100 mg by mouth 2 times daily        senna-docusate (SENOKOT-S/PERICOLACE) 8.6-50 MG tablet Take 1-2 tablets by mouth 2 times daily Take while on oral narcotics to prevent or treat constipation. (Patient not taking: Reported on 3/3/2022) 30 tablet 0            Physical Exam:   Blood pressure (!) 143/83, pulse 66, height 1.626 m (5' 4\"), weight 62.2 kg (137 lb 3.2 oz), SpO2 100 " %, not currently breastfeeding.  Wt Readings from Last 6 Encounters:   03/03/22 62.2 kg (137 lb 3.2 oz)   01/18/22 64.9 kg (143 lb)   10/26/21 64 kg (141 lb)   09/20/21 63 kg (139 lb)   08/27/21 63 kg (139 lb)   08/16/21 62.6 kg (138 lb)     Constitutional: well-developed, appearing stated age; cooperative  Eyes: nl EOM, PERRLA, vision, conjunctiva, sclera  ENT: nl external ears, nose, hearing, lips, teeth, gums, throat  No mucous membrane lesions, normal saliva pool  Neck: no mass or thyroid enlargement  Lymph: no cervical, supraclavicular, inguinal or epitrochlear nodes  MS: The TMJ, neck, shoulder, elbow, wrist, MCP/PIP/DIP, spine, hip, knee, ankle, and foot MTP/IP joints were examined and found normal. No active synovitis or altered joint anatomy. Full joint ROM. Normal  strength. Scar dorsum of left wrist. Some non-tender swelling along the dorsum of the left wrist. Right wrist without tenderness. No appreciable synovitis of the right MCPs and PIPs. Walks with a walker due to recent right knee replacement.  Skin: no nail pitting, alopecia, rash, nodules or lesions   Psych: nl judgement, orientation, memory, affect.         Data:     @  RHEUM RESULTS Latest Ref Rng & Units 4/23/2013 4/24/2013 6/19/2019   EVA INTERPRETATION Negative - - -   ANAP1 - - - -   ANAT1 - - - -   CREATININE 0.52 - 1.04 mg/dL - - 0.69   CRP 0.0-<0.8 mg/dL - - -   GFR ESTIMATE, IF BLACK >60 mL/min/1.73m2 - - >90   GFR ESTIMATE >60 mL/min/1.73m2 - - 87   HEMATOCRIT 35.0 - 47.0 % - - -   HEMOGLOBIN 11.7 - 15.7 g/dL 11.5(L) 11.8 -   WBC 4.0 - 11.0 thou/uL - - -   RBC 3.80 - 5.40 mill/uL - - -   RDW 11.0 - 14.5 % - - -   MCHC 32.0 - 36.0 g/dL - - -   MCV 80 - 100 fL - - -   PLATELET COUNT 140 - 440 thou/uL - - -   RHEUMATOID FACTOR 0 - 30 IU/mL - - -       Rheumatoid Factor   Date Value Ref Range Status   11/26/2021 <15 0 - 30 IU/mL Final   ,  ,  ,  ,  ,  ,   EVA interpretation   Date Value Ref Range Status   11/26/2021 Borderline Positive  (A) Negative Final     Comment:       Negative:              <1:40  Borderline Positive:   1:40 - 1:80  Positive:              >1:80     EVA pattern 1   Date Value Ref Range Status   11/26/2021 Speckled  Final     EVA titer 1   Date Value Ref Range Status   11/26/2021 1:40  Final     On January 12, 2022, sodium was low at 131, slightly improved from values noted in July and June 2021.  Creatinine was 0.71.  Ferritin was 81.2.  In November 2021, sedimentation rate was 27 normal and C-reactive protein was mildly elevated at 2.15.  On January 28, 2021 IgE was elevated.  SPEP was normal on October 21, 2020  In December 2018, antinuclear antibody was negative; rheumatoid factor and CCP antibodies were negative earlier.

## 2022-03-02 ENCOUNTER — TRANSFERRED RECORDS (OUTPATIENT)
Dept: HEALTH INFORMATION MANAGEMENT | Facility: CLINIC | Age: 77
End: 2022-03-02

## 2022-03-03 ENCOUNTER — DOCUMENTATION ONLY (OUTPATIENT)
Dept: RHEUMATOLOGY | Facility: CLINIC | Age: 77
End: 2022-03-03

## 2022-03-03 ENCOUNTER — OFFICE VISIT (OUTPATIENT)
Dept: RHEUMATOLOGY | Facility: CLINIC | Age: 77
End: 2022-03-03
Payer: MEDICARE

## 2022-03-03 VITALS
HEART RATE: 66 BPM | DIASTOLIC BLOOD PRESSURE: 83 MMHG | OXYGEN SATURATION: 100 % | SYSTOLIC BLOOD PRESSURE: 143 MMHG | BODY MASS INDEX: 23.42 KG/M2 | WEIGHT: 137.2 LBS | HEIGHT: 64 IN

## 2022-03-03 DIAGNOSIS — M65.90 SYNOVITIS AND TENOSYNOVITIS: Primary | ICD-10-CM

## 2022-03-03 DIAGNOSIS — Z79.631 LONG TERM METHOTREXATE USER: ICD-10-CM

## 2022-03-03 DIAGNOSIS — M65.90 TENOSYNOVITIS: ICD-10-CM

## 2022-03-03 PROCEDURE — 99205 OFFICE O/P NEW HI 60 MIN: CPT | Performed by: INTERNAL MEDICINE

## 2022-03-03 RX ORDER — FOLIC ACID 1 MG/1
1 TABLET ORAL DAILY
Qty: 90 TABLET | Refills: 3 | Status: SHIPPED | OUTPATIENT
Start: 2022-03-03 | End: 2023-01-01

## 2022-03-03 ASSESSMENT — PAIN SCALES - GENERAL: PAINLEVEL: WORST PAIN (10)

## 2022-03-03 NOTE — PROGRESS NOTES
Faxed todays lab orders over to [facility] and requested results be sent back here to Dr. Elmore's attention.   Scheduled follow up appointment May 12, 10 am.    Linda Edge MA

## 2022-03-03 NOTE — PATIENT INSTRUCTIONS
Diagnosis:  #Joint Pain: There are multiple areas of pain, including wrist swelling/pain. Will check further labs for inflammatory arthritis. Given the results of the operative report and MRI report of the left wrist, working diagnosis is seronegative inflammatory arthritis. Recommend trial of  Methotrexate for immunomodulatory and pain relieving effect over time.  #neck pain: I am reassured by the results of recent cervical spine MRI that there is not an autoimmune/inflammatory component to neck pain. Agree with the occipital nerve blocks more time to work, it is encouraging that you had relief from the anesthetic phase.       Plan:  1. Will check labs today, this is for inflammatory arthritis.   2. Start Methotrexate 2.5mg tablets. Take 2 tablets (test dose) on week 1. If tolerated after 7 days, increase to 6 tablets weekly. Continue 6 tabs every week until followup  While on Methotrexate:  -check labs (ALT/AST, albumin, CBC with platelets and creatinine) every 3 months  -Limit alcohol to 2 drinks weekly  -Take Folic Acid 1mg daily   -Tylenol 500-1000mg can be used as needed up to three times daily for nausea/headache associated with dosing  -this medication works gradually to relieve inflammatory symptoms at the joints; expect maximum benefit at prescribed dose by 4 weeks.  3. Continue injections, pain medications, topical salves, heat as per Dr. Joya and primary care for neck pain.

## 2022-03-03 NOTE — NURSING NOTE
"Chief Complaint   Patient presents with     New Patient     Scoliosis, unspecified scoliosis type, unspecified spinal region, Unspecified osteoarthritis, unspecified site       Vitals:    03/03/22 0948   BP: (!) 143/83   BP Location: Left arm   Patient Position: Sitting   Cuff Size: Adult Regular   Pulse: 66   SpO2: 100%   Weight: 62.2 kg (137 lb 3.2 oz)   Height: 1.626 m (5' 4\")       Body mass index is 23.55 kg/m .    Linda Edge MA    "

## 2022-03-08 ENCOUNTER — TRANSFERRED RECORDS (OUTPATIENT)
Dept: HEALTH INFORMATION MANAGEMENT | Facility: CLINIC | Age: 77
End: 2022-03-08

## 2022-03-11 DIAGNOSIS — G30.1 LATE ONSET ALZHEIMER'S DEMENTIA WITHOUT BEHAVIORAL DISTURBANCE (H): ICD-10-CM

## 2022-03-11 DIAGNOSIS — F02.80 LATE ONSET ALZHEIMER'S DEMENTIA WITHOUT BEHAVIORAL DISTURBANCE (H): ICD-10-CM

## 2022-03-11 RX ORDER — DONEPEZIL HYDROCHLORIDE 5 MG/1
TABLET, FILM COATED ORAL
Qty: 90 TABLET | Refills: 0 | Status: SHIPPED | OUTPATIENT
Start: 2022-03-11 | End: 2022-03-16

## 2022-03-11 NOTE — TELEPHONE ENCOUNTER
Refill request for Aricept. Pt last seen 11/16/21 and has follow up scheduled for 3/16/22. Will send in refill.     Kristie Schulte RN on 3/11/2022 at 11:33 AM

## 2022-03-14 ENCOUNTER — TRANSFERRED RECORDS (OUTPATIENT)
Dept: HEALTH INFORMATION MANAGEMENT | Facility: CLINIC | Age: 77
End: 2022-03-14

## 2022-03-16 ENCOUNTER — VIRTUAL VISIT (OUTPATIENT)
Dept: NEUROLOGY | Facility: CLINIC | Age: 77
End: 2022-03-16
Payer: MEDICARE

## 2022-03-16 VITALS — HEIGHT: 64 IN | BODY MASS INDEX: 23.05 KG/M2 | WEIGHT: 135 LBS

## 2022-03-16 DIAGNOSIS — G44.211 INTRACTABLE EPISODIC TENSION-TYPE HEADACHE: ICD-10-CM

## 2022-03-16 DIAGNOSIS — G30.1 LATE ONSET ALZHEIMER'S DEMENTIA WITHOUT BEHAVIORAL DISTURBANCE (H): Primary | ICD-10-CM

## 2022-03-16 DIAGNOSIS — F02.80 LATE ONSET ALZHEIMER'S DEMENTIA WITHOUT BEHAVIORAL DISTURBANCE (H): Primary | ICD-10-CM

## 2022-03-16 PROCEDURE — 99214 OFFICE O/P EST MOD 30 MIN: CPT | Mod: 95 | Performed by: PSYCHIATRY & NEUROLOGY

## 2022-03-16 RX ORDER — DONEPEZIL HYDROCHLORIDE 10 MG/1
10 TABLET, FILM COATED ORAL AT BEDTIME
Qty: 90 TABLET | Refills: 3 | Status: SHIPPED | OUTPATIENT
Start: 2022-03-16 | End: 2022-01-01

## 2022-03-16 NOTE — LETTER
3/16/2022         RE: Leonardo De La Rosa  [address] [facility] [address]        Dear Colleague,    Thank you for referring your patient, Leonardo De La Rosa, to the [facility]. Please see a copy of my visit note below.    NEUROLOGY FOLLOW UP VISIT  NOTE       [facility]  [address] [place] [address]  Tel: [phone]  Fax: [phone]  [email or web address]     Leonardo De La Rosa,  1945, MRN [id number]  PCP: Cassie Ortega  Date: 2022      ASSESSMENT & PLAN     Visit Diagnosis  1. Late onset Alzheimer's dementia without behavioral disturbance (H)  2. Intractable episodic tension-type headache     Late onset Alzheimer's dementia without behavioral disturbance  Patient is a 76-year-old female with history of SNHL, osteoporosis, GERD who is being followed in our clinic for progressive cognitive decline.  She had extensive work-up twice for her cognitive issues that included MRI brain, MRA, neuropsychological testing and lab work that were normal.  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  Recent repeat work-up included normal MRI and repeat lab work and as initial neuropsychological testing was done virtually repeat neuropsychological testing was done in person that suggested a mild unspecified neurocognitive disorder with adjustment disorder and anxiety.  Brain PET scan was performed that showed hypometabolism in the parietal lobe (right greater than left) and lesser loss in frontal region.  Such pattern can be seen in early Alzheimer's dementia.  She was started on Aricept during her last visit and I recommended increasing it to 10 mg daily.  I am also checking hepatic profile.  Follow-up will be in 6 months.    Cervical radiculopathy  Patient is complaining of neck pain that at times radiates to the right side of the head.   She recently had lab work done that includes normal sed rate but elevated C-reactive protein.  She had greater occipital nerve block and also epidural injection recently by orthopedics but does not seem to help.  She is on Cymbalta, Lyrica and also takes Dilaudid.  Due to her intractable pain I have recommended evaluation by pain clinic    Frequent Falls  Patient was seen at [facility] and had MRI of cervical spine, EMG and MRI lumbar spine that did not show any high-grade stenosis.  They felt her weakness was related to hip flexor involvement due to spinal canal stenosis and was sent for physical therapy.     Thank you again for this referral, please feel free to contact me if you have any questions.    Augustin Terry MD  [facility]  (Formerly, [facility]     HISTORY OF PRESENT ILLNESS     Patient is a 76-year-old female with history of SNHL, osteoporosis, GERD who is been followed in our clinic for progressive cognitive decline.  She had extensive work-up twice for her cognitive decline that included MRI brain, MRA, neuropsychological testing and lab work that all were normal.  She was under a lot of stress due to her  medical condition and social situation.  Her  has dementia and initially diagnosis of pseudodementia was made.  Repeat neuropsychological testing suggested mild neurocognitive disorder with adjustment disorder and anxiety.  She had a brain PET scan that showed hypometabolism in the parietal lobe, right greater than left and lesser loss in frontal region.  This pattern is usually seen in early Alzheimer's dementia and she was started on Aricept and my plan was to increase it to 10 mg daily after 2 months.  Patient had called multiple times since her last visit complaining of headaches and was told to use Medrol Dosepak as naproxen was not helping.  Daughter had called recently asking about Aduhelm and other investigational  studies.  I had informed her that Aduhelm although approved for early Alzheimer's dementia is associated with significant side effects and approval process was controversial.  At present I would recommend continuing with Aricept. PRECIVITY can help confirm the diagnosis in some patients but at times insurance does not cover this for this test and also is still not considered standard of practice    Patient also has history of frequent falls for which she was seen at [facility] and had MRI of the cervical spine, EMG and MRI of the lumbar spine that were unremarkable and did not show any high-grade stenosis.  They felt her weakness was related to hip flexor involvement due to spinal canal stenosis and that she was referred for physical therapy.  Although she was instructed to use a cane she avoids using it and has fallen multiple times resulting in fractures and bruises.    During her last visit she had also complained of some neck pain with radiation down onto the right side.  She had lab work that included normal sed rate but an elevated C-reactive protein and labs were repeated and were normal.  CT cervical spine showed mild degenerative changes but no significant spinal canal stenosis.  Since then she has been seen by orthopedics and had greater occipital nerve block and epidural injection that did not seem to help.  She currently takes pregabalin, Cymbalta and also on Dilaudid.  Daughter is concerned if there is some other issue going on that could be playing a role.  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    Surgical History:  She  has a past surgical history that includes TOTAL KNEE ARTHROPLASTY; Foot surgery; Hysterectomy total abdominal, bilateral salpingo-oophorectomy, combined; Arthrodesis foot (4/22/2013); Bunionectomy lapidus with tarsal metatarsal (TMT) fusion (4/22/2013); Hysterectomy; knee surgery; Foot surgery; and Arthroplasty knee (Right, 1/18/2022).     SOCIAL HISTORY     Reviewed, and she  reports that she has never smoked. She has never used smokeless tobacco. She reports that she does not drink alcohol and does not use drugs.     FAMILY HISTORY     Reviewed, and family history includes Coronary Artery Disease in her mother.     ALLERGIES     Allergies   Allergen Reactions     Estratest H.S. Other (See Comments) and Unknown     Other reaction(s): Other (see comments)  Increase lipids  Outside source did not list reaction  Increase lipids       Hydrocodone      Oxycodone      Propofol Nausea and Vomiting     Outside source states \"injectable and inhaled\"  Outside source states \"injectable and inhaled\"  Outside source states \"injectable and inhaled\"  Outside source states \"injectable and inhaled\"       Reglan      Estrogens Rash     Other reaction(s): Other (see comments)  Outside source did not list reaction  Estrogen patches, from patch only, ( Adhesive )  does tolerate estrogen other forms        Penicillins Nausea and Vomiting and Nausea     Other reaction(s): GI intolerance, GI Upset  Patient states upset stomach.  Patient states upset stomach.  Patient states upset stomach.  Patient states upset stomach.       "     REVIEW OF SYSTEMS     A 12 point review of system was performed and was negative except as outlined in the history of present illness.     HOME MEDICATIONS     Current Outpatient Rx   Medication Sig Dispense Refill     acetaminophen (TYLENOL) 325 MG tablet Take 2 tablets (650 mg) by mouth every 4 hours as needed for other (mild pain) 100 tablet 0     ammonium lactate (AMLACTIN) 12 % external cream Apply topically daily as needed for dry skin       aspirin (ASA) 325 MG EC tablet Take 1 tablet (325 mg) by mouth daily 30 tablet 0     azelastine (ASTELIN) 0.1 % nasal spray Spray 1 spray into both nostrils every morning       calcium carbonate-vitamin D (CALCIUM + D) 600-200 MG-UNIT TABS Take 1 tablet by mouth daily.       denosumab (PROLIA) 60 MG/ML SOSY injection Inject 60 mg Subcutaneous every 6 months        donepezil (ARICEPT) 10 MG tablet Take 1 tablet (10 mg) by mouth At Bedtime 90 tablet 3     DULoxetine (CYMBALTA) 60 MG capsule Take 60 mg by mouth daily        famotidine (PEPCID) 40 MG tablet Take 20 mg by mouth every evening        ferrous sulfate (FEROSUL) 325 (65 Fe) MG tablet Take 325 mg by mouth every morning       fluocin-hydroquinone-tretinoin (TRI-RODNEY) 0.01-4-0.05 % CREA Externally apply topically At Bedtime 30 g 3     fluticasone (FLONASE) 50 MCG/ACT nasal spray INSTILL 1 SPRAY INTO EACH NOSTRIL ONCE DAILY AS NEEDED FOR RHINITIS       folic acid (FOLVITE) 1 MG tablet Take 1 tablet (1 mg) by mouth daily 90 tablet 3     glucosamine-chondroitin 500-400 MG CAPS Take 1 capsule by mouth daily.       HYDROmorphone (DILAUDID) 2 MG tablet Take 1 tablet (2 mg) by mouth every 4 hours as needed for moderate to severe pain 40 tablet 0     levocetirizine (XYZAL) 5 MG tablet Take 5 mg by mouth every morning       losartan (COZAAR) 100 MG tablet Take 100 mg by mouth daily        methocarbamol (ROBAXIN) 500 MG tablet TAKE 1 TABLET BY MOUTH 3 TIMES A DAY       methotrexate 2.5 MG tablet Take 6 tablets (15 mg) by  "mouth every 7 days Labs every 8 - 12 weeks for refills. 24 tablet 3     multivitamin, therapeutic with minerals (MULTI-VITAMIN) TABS Take 1 tablet by mouth daily.       Omega-3 Fatty Acids (OMEGA-3 FISH OIL) 1200 MG CAPS Take 1,200 g by mouth every 7 days on Friday       omeprazole (PRILOSEC) 20 MG DR capsule Take 20 mg by mouth every morning        pravastatin (PRAVACHOL) 20 MG tablet Take 20 mg by mouth daily        pregabalin (LYRICA) 75 MG capsule Take 75 mg by mouth 2 times daily Patient taking 50 mg in am and 75 mg in pm       senna-docusate (SENOKOT-S/PERICOLACE) 8.6-50 MG tablet Take 1-2 tablets by mouth 2 times daily Take while on oral narcotics to prevent or treat constipation. 30 tablet 0     Vitamin D3 (VITAMIN D3) 25 mcg (1000 units) tablet Take 1,000 Units by mouth daily        zolpidem (AMBIEN) 5 MG tablet Take 5 mg by mouth At Bedtime            PHYSICAL EXAM     Vital signs  Ht 1.626 m (5' 4\")   Wt 61.2 kg (135 lb)   BMI 23.17 kg/m      Weight:   135 lbs 0 oz    Patient is alert and oriented x3 speech was normal with no dysarthria or aphasia.  Extraocular movements are intact face symmetrical.  She moves all 4 extremities.  Rest of the exam was not possible on a video visit     PERTINENT DIAGNOSTIC STUDIES     Following studies were reviewed:     CT CERVICAL SPINE 11/26/2021  1.  Variable degrees of mild degenerative change throughout the cervical spine. No evidence of significant spinal canal stenosis. Variable degrees of mild foraminal narrowing due to uncovertebral joint and facet hypertrophic change at the above-mentioned   levels. Please see body of report for details as above. No acute findings.    CT BRAIN 12/17/2020  1.  No acute intracranial process.  2.  Minimal chronic small vessel ischemic disease.     CT LUMBAR SPINE 9/23/2020  1. New but subacute to chronic appearing fractures of L2, including mild  superior endplate compression and incompletely healed bilateral pedicle  fractures, " which on the left extend into the posterosuperior vertebral body.  2. Postoperative changes including anterior and posterior instrumented fusion at  L5-S1. No hardware failure. No advanced spinal canal or neural foraminal  narrowing at any level.     MRI BRAIN 9/13/2021  1. No evidence of acute intracranial hemorrhage, mass effect, or infarction.  2. Mild nonspecific white matter changes.  3. Mild brain parenchymal volume loss.     MRI CERVICAL SPINE 7/12/2021  1.  Mild central canal and left foraminal narrowing at C6-7  2.  Mild degenerative changes within the remainder of the cervical spine as above     MRI, MRA BRAIN 2/26/2021  HEAD MRI:   1.  Mild chronic small vessel ischemic changes and age-related changes.  2.  Otherwise normal brain MRI.    HEAD MRA:   1.  Normal MRA [facility] [place].    NECK MRA:  1.  Minimal plaque in the right carotid bulb, but no significant associated stenosis by NASCET criteria.  2.   Otherwise normal neck MRA.     MRI LUMBAR SPINE 9/23/2020  No new spondylolisthesis since 05/04/2020. Interval subacute  appearing superior L2 compression fracture and bilateral pedicle fractures.  Stable chronic healing fracture through the posterolateral left L3 vertebral  body and pedicle.     BRAIN PET 11/12/2021  FDG metabolic pattern of the brain demonstrates  hypometabolism in the parietal lobes (right greater then left) and  lesser loss in frontal, this pattern can be seen with early  Alzheimer's disease.     EMG 7/13/2021  1.  This is a normal electrodiagnostic study of bilateral lower extremities  2.  Specifically there is no electrodiagnostic evidence of a lumbar radiculopathy, plexopathy or peripheral apathy     NEUROPSYCHOLOGICAL EVALUATION 10/18/2021  DIAGNOSIS:  Unspecified Mild Neurocognitive Disorder     Adjustment Disorder with Anxiety      RECOMMENDATIONS:  1) Ongoing neurologic care and monitoring is recommended.      2) If not medically contraindicated, Ms. De La Rosa may benefit from an  empirical trial of a cognitive-enhancing medication.      3) Consider a PET scan to help clarify etiology of Ms. De La Rosa's cognitive decline.     4) Consider a referral to Medication Therapy Management (pharmacist) for a review of Ms. De La Rosa's current regimen and to provide recommendations in order to minimize any unwanted side effects.     5) Additional caregiving support for Ms. De La Rosa is crucial. There are several cultural factors at play, but I would highly encourage Ms. De La Rosa to consider formal in-home care giving services to assist her so that she can have some respite. Some other ideas for increased support include hiring a caregiver to come at night so that she can sleep, hiring a cook who is familiar at preparing [place] cuisine, or even having regularly scheduled times of respite when her daughters can attend to their dad so she can engage in her own self-care activities (e.g., every Saturday and Sunday from 12-5 PM).     6) Although the patient has declined this in the past, I would encourage her to consider establishing care with a psychotherapist for additional support. If she prefers a provider who is more familiar with her culture, there are a couple of options that I found in the community:    Jenny Mak of [facility] in [place] ([phone])    Fran Gutierrez of [employer] in [facility] ([phone])     7) Given her mild memory issues, occasional oversight is encouraged with more complex and/or novel tasks to ensure accuracy and safety (e.g., medication and financial management). She should allow herself extra time to complete these types of tasks as well.     8) The patient is encouraged to utilize cognitive compensatory strategies in daily life, including utilizing note pads, checklists, to-do lists, a calendar/planner, labeled alarm reminders, a GPS, a pillbox, and maintaining a daily morning and nighttime routine and an organized living/work  environment.     9) Ms. De La Rosa is strongly encouraged to adhere closely to her medication regimen to help keep her cerebrovascular risk factors (e.g., hyperlipidemia) and other medical conditions well controlled. This may help prevent further exacerbations in cognitive decline in the future.     10) Neuropsychological follow-up is recommended in 12-18 months (or as clinically indicated) in order to monitor her cognitive status, help to clarify etiology, and update recommendations. The current test data can be used as a baseline to which future comparisons can be made.     NEUROPSYCHOLOGICAL EVALUATION 3/25/2021  No Cognitive Disorder     Adjustment Disorder with Anxiety     RECOMMENDATIONS:  1) Stress management strategies are strongly encouraged, as improvement in emotional distress will lead to perceived improvements in her cognitive functioning (and even her physical pain level to some degree) over time. Such strategies may include:  ? Participating in individual psychotherapy. If the patient is amenable, I will refer her to our psychologist (Dr. Flavia Rivera). I will have our schedulers call her to set up an appointment, or she can always call our clinic at a later time to establish care ([phone]).  ? Consider an adjustment to her medications for her mood/anxiety. She experienced side effects with increased dosing of Cymbalta; as such, consider an alternative medication for anxiety (e.g., a selective serotonin reuptake inhibitor). This may help with her daytime anxiety as well as the heightened anxiety she experiences at night (she fears her sleep medications will make her too sedated to respond to her 's needs, but her anxiety about this keeps her from sleeping and increases her hypervigilance at night as well). This recommendation will be deferred to her PCP.  ? Utilizing relaxation practices. I will mail her a handout with some techniques she may try on her own.  ? Attending caregiving support  groups. She may find these through the Alzheimer's Association website or hotline (https://www.alz.org/ or by calling 465-352-5515 ) or through the "[employer] Line ([place]://[place].gov/[employer]-linkage-line/ or by calling 460-101-8638).     2) In addition, a referral to a  or  or calling the [employer] Linkage Line may be helpful to assist her in seeking out respite services or formal in-home care giving services to allow Ms. De La Rosa to take a break or re-engage in her hobbies and interests.     3) Ms. De La Rosa is also encouraged to follow up with her medical providers for ongoing pain management. Improvements in her pain may help her stress level and cognitive functioning to some degree.     4) Ms. De La Rosa should adhere closely to her medication regimen in order to manage her cerebrovascular risk factors (hyperlipidemia), pain, and mood. This may help to prevent future cognitive decline.     5) The patient is encouraged to remain physically, socially, and mentally active for optimal brain health. This may also help to improve her emotional wellbeing.     6) From a neurocognitive standpoint, I have no concerns about the patient's ability to independently perform IADLs or care for her .      7) Cognitive strategies may be helpful for her own reassurance. These may include utilizing note pads, checklists, to-do lists, alarm reminders, a detailed calendar/planner, a GPS, a pillbox, and maintaining a daily morning and nighttime routine and an organized living environment. She should avoid multi-tasking when possible and should attempt to reduce as much distraction as possible while performing complex or important tasks.     8) Neuropsychological follow-up is not warranted at this time. However, the current test data can now serve as a baseline should a repeat assessment be indicated in the future           PERTINENT LABS  Following labs were reviewed:  Admission on 01/18/2022, Discharged on 01/19/2022    Component Date Value     Hemoglobin 01/19/2022 8.9 (A)     Creatinine 01/19/2022 0.52      GFR Estimate 01/19/2022 >90      Glucose 01/19/2022 89      Patient Fasting > 8hrs? 01/19/2022 No    Lab on 01/14/2022   Component Date Value     SARS CoV2 PCR 01/14/2022 Negative          VIDEO VISIT DETAILS  Patient is being evaluated via a billable video visit.   Patient would like the video invitation sent by: [x]E-mail  []SMS   Type of service: Video Visit  Video Start Time: 1:13 PM  Video End Time (time video stopped): 1:37 PM  Originating Location (Patient Location): Patient's Home  Distant Location (provider location): [facility] [place]   Mode of Communication: Video Conference via [x][employer] []Doximity    Total time spent for face to face visit, reviewing labs/imaging studies, counseling and coordination of care was: 30 Minutes spent on the date of the encounter doing chart review, review of outside records, review of test results, interpretation of tests, patient visit, documentation and discussion with family       This note was dictated using voice recognition software.  Any grammatical or context distortions are unintentional and inherent to the software.    Orders Placed This Encounter   Procedures     Hepatic function panel     Pain Management Referral      New Prescriptions    No medications on file     Modified Medications    Modified Medication Previous Medication    DONEPEZIL (ARICEPT) 10 MG TABLET donepezil (ARICEPT) 5 MG tablet       Take 1 tablet (10 mg) by mouth At Bedtime    TAKE 1 TABLET BY MOUTH AT BEDTIME                     Again, thank you for allowing me to participate in the care of your patient.        Sincerely,        Augustin Terry MD

## 2022-03-16 NOTE — PROGRESS NOTES
NEUROLOGY FOLLOW UP VISIT  NOTE       [facility] NEUROLOGY [place]  [address] [place] [address]  Tel: [phone]  Fax: [phone]  [email or web address]     Leonardo De La Rosa,  1945, MRN [id number]  PCP: Cassie Ortega  Date: 2022      ASSESSMENT & PLAN     Visit Diagnosis  Late onset Alzheimer's dementia without behavioral disturbance (H)  Intractable episodic tension-type headache     Late onset Alzheimer's dementia without behavioral disturbance  Patient is a 76-year-old female with history of SNHL, osteoporosis, GERD who is being followed in our clinic for progressive cognitive decline.  She had extensive work-up twice for her cognitive issues that included MRI brain, MRA, neuropsychological testing and lab work that were normal.  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  Recent repeat work-up included normal MRI and repeat lab work and as initial neuropsychological testing was done virtually repeat neuropsychological testing was done in person that suggested a mild unspecified neurocognitive disorder with adjustment disorder and anxiety.  Brain PET scan was performed that showed hypometabolism in the parietal lobe (right greater than left) and lesser loss in frontal region.  Such pattern can be seen in early Alzheimer's dementia.  She was started on Aricept during her last visit and I recommended increasing it to 10 mg daily.  I am also checking hepatic profile.  Follow-up will be in 6 months.    Cervical radiculopathy  Patient is complaining of neck pain that at times radiates to the right side of the head.  She recently had lab work done that includes normal sed rate but elevated C-reactive protein.  She had greater occipital nerve block and also epidural injection recently by orthopedics but does not seem to help.  She is on Cymbalta, Lyrica and also takes Dilaudid.   Due to her intractable pain I have recommended evaluation by pain clinic    Frequent Falls  Patient was seen at [facility] and had MRI of cervical spine, EMG and MRI lumbar spine that did not show any high-grade stenosis.  They felt her weakness was related to hip flexor involvement due to spinal canal stenosis and was sent for physical therapy.     Thank you again for this referral, please feel free to contact me if you have any questions.    Augustin Terry MD  [facility]  (Formerly, [facility]     HISTORY OF PRESENT ILLNESS     Patient is a 76-year-old female with history of SNHL, osteoporosis, GERD who is been followed in our clinic for progressive cognitive decline.  She had extensive work-up twice for her cognitive decline that included MRI brain, MRA, neuropsychological testing and lab work that all were normal.  She was under a lot of stress due to her  medical condition and social situation.  Her  has dementia and initially diagnosis of pseudodementia was made.  Repeat neuropsychological testing suggested mild neurocognitive disorder with adjustment disorder and anxiety.  She had a brain PET scan that showed hypometabolism in the parietal lobe, right greater than left and lesser loss in frontal region.  This pattern is usually seen in early Alzheimer's dementia and she was started on Aricept and my plan was to increase it to 10 mg daily after 2 months.  Patient had called multiple times since her last visit complaining of headaches and was told to use Medrol Dosepak as naproxen was not helping.  Daughter had called recently asking about Aduhelm and other investigational studies.  I had informed her that Aduhelm although approved for early Alzheimer's dementia is associated with significant side effects and approval process was controversial.  At present I would recommend continuing with Aricept. PRECIVITY can help confirm the  diagnosis in some patients but at times insurance does not cover this for this test and also is still not considered standard of practice    Patient also has history of frequent falls for which she was seen at [facility] and had MRI of the cervical spine, EMG and MRI of the lumbar spine that were unremarkable and did not show any high-grade stenosis.  They felt her weakness was related to hip flexor involvement due to spinal canal stenosis and that she was referred for physical therapy.  Although she was instructed to use a cane she avoids using it and has fallen multiple times resulting in fractures and bruises.    During her last visit she had also complained of some neck pain with radiation down onto the right side.  She had lab work that included normal sed rate but an elevated C-reactive protein and labs were repeated and were normal.  CT cervical spine showed mild degenerative changes but no significant spinal canal stenosis.  Since then she has been seen by orthopedics and had greater occipital nerve block and epidural injection that did not seem to help.  She currently takes pregabalin, Cymbalta and also on Dilaudid.  Daughter is concerned if there is some other issue going on that could be playing a role.  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    Surgical History:  She  has a past surgical history that includes TOTAL KNEE ARTHROPLASTY; Foot surgery; Hysterectomy total abdominal, bilateral salpingo-oophorectomy, combined; Arthrodesis foot (4/22/2013); Bunionectomy lapidus with tarsal metatarsal (TMT) fusion (4/22/2013); Hysterectomy; knee surgery; Foot surgery; and Arthroplasty knee (Right, 1/18/2022).     SOCIAL HISTORY     Reviewed, and she  reports that she has never smoked. She has never used smokeless tobacco. She reports that she does not drink alcohol and does not use drugs.     FAMILY HISTORY     Reviewed, and family history includes Coronary Artery Disease in her mother.     ALLERGIES     Allergies   Allergen Reactions     Estratest H.S. Other (See Comments) and Unknown     Other reaction(s): Other (see comments)  Increase lipids  Outside source did not list reaction  Increase lipids       Hydrocodone      Oxycodone      Propofol Nausea and Vomiting     Outside source states \"injectable and inhaled\"  Outside source states \"injectable and inhaled\"  Outside source states \"injectable and inhaled\"  Outside source states \"injectable and inhaled\"       Reglan      Estrogens Rash     Other reaction(s): Other (see comments)  Outside source did not list reaction  Estrogen patches, from patch only, ( Adhesive )  does tolerate estrogen other forms        Penicillins Nausea and Vomiting and Nausea     Other reaction(s): GI intolerance, GI Upset  Patient states upset stomach.  Patient states upset stomach.  Patient states upset stomach.  Patient states upset stomach.           REVIEW OF SYSTEMS     A 12 point review of system was performed and was negative except as outlined in the history of present illness.     HOME MEDICATIONS     Current Outpatient Rx   Medication Sig Dispense Refill     acetaminophen (TYLENOL) 325 MG tablet " Take 2 tablets (650 mg) by mouth every 4 hours as needed for other (mild pain) 100 tablet 0     ammonium lactate (AMLACTIN) 12 % external cream Apply topically daily as needed for dry skin       aspirin (ASA) 325 MG EC tablet Take 1 tablet (325 mg) by mouth daily 30 tablet 0     azelastine (ASTELIN) 0.1 % nasal spray Spray 1 spray into both nostrils every morning       calcium carbonate-vitamin D (CALCIUM + D) 600-200 MG-UNIT TABS Take 1 tablet by mouth daily.       denosumab (PROLIA) 60 MG/ML SOSY injection Inject 60 mg Subcutaneous every 6 months        donepezil (ARICEPT) 10 MG tablet Take 1 tablet (10 mg) by mouth At Bedtime 90 tablet 3     DULoxetine (CYMBALTA) 60 MG capsule Take 60 mg by mouth daily        famotidine (PEPCID) 40 MG tablet Take 20 mg by mouth every evening        ferrous sulfate (FEROSUL) 325 (65 Fe) MG tablet Take 325 mg by mouth every morning       fluocin-hydroquinone-tretinoin (TRI-RODNEY) 0.01-4-0.05 % CREA Externally apply topically At Bedtime 30 g 3     fluticasone (FLONASE) 50 MCG/ACT nasal spray INSTILL 1 SPRAY INTO EACH NOSTRIL ONCE DAILY AS NEEDED FOR RHINITIS       folic acid (FOLVITE) 1 MG tablet Take 1 tablet (1 mg) by mouth daily 90 tablet 3     glucosamine-chondroitin 500-400 MG CAPS Take 1 capsule by mouth daily.       HYDROmorphone (DILAUDID) 2 MG tablet Take 1 tablet (2 mg) by mouth every 4 hours as needed for moderate to severe pain 40 tablet 0     levocetirizine (XYZAL) 5 MG tablet Take 5 mg by mouth every morning       losartan (COZAAR) 100 MG tablet Take 100 mg by mouth daily        methocarbamol (ROBAXIN) 500 MG tablet TAKE 1 TABLET BY MOUTH 3 TIMES A DAY       methotrexate 2.5 MG tablet Take 6 tablets (15 mg) by mouth every 7 days Labs every 8 - 12 weeks for refills. 24 tablet 3     multivitamin, therapeutic with minerals (MULTI-VITAMIN) TABS Take 1 tablet by mouth daily.       Omega-3 Fatty Acids (OMEGA-3 FISH OIL) 1200 MG CAPS Take 1,200 g by mouth every 7 days on  "Friday       omeprazole (PRILOSEC) 20 MG DR capsule Take 20 mg by mouth every morning        pravastatin (PRAVACHOL) 20 MG tablet Take 20 mg by mouth daily        pregabalin (LYRICA) 75 MG capsule Take 75 mg by mouth 2 times daily Patient taking 50 mg in am and 75 mg in pm       senna-docusate (SENOKOT-S/PERICOLACE) 8.6-50 MG tablet Take 1-2 tablets by mouth 2 times daily Take while on oral narcotics to prevent or treat constipation. 30 tablet 0     Vitamin D3 (VITAMIN D3) 25 mcg (1000 units) tablet Take 1,000 Units by mouth daily        zolpidem (AMBIEN) 5 MG tablet Take 5 mg by mouth At Bedtime            PHYSICAL EXAM     Vital signs  Ht 1.626 m (5' 4\")   Wt 61.2 kg (135 lb)   BMI 23.17 kg/m      Weight:   135 lbs 0 oz    Patient is alert and oriented x3 speech was normal with no dysarthria or aphasia.  Extraocular movements are intact face symmetrical.  She moves all 4 extremities.  Rest of the exam was not possible on a video visit     PERTINENT DIAGNOSTIC STUDIES     Following studies were reviewed:     CT CERVICAL SPINE 11/26/2021  1.  Variable degrees of mild degenerative change throughout the cervical spine. No evidence of significant spinal canal stenosis. Variable degrees of mild foraminal narrowing due to uncovertebral joint and facet hypertrophic change at the above-mentioned   levels. Please see body of report for details as above. No acute findings.    CT BRAIN 12/17/2020  1.  No acute intracranial process.  2.  Minimal chronic small vessel ischemic disease.     CT LUMBAR SPINE 9/23/2020  1. New but subacute to chronic appearing fractures of L2, including mild  superior endplate compression and incompletely healed bilateral pedicle  fractures, which on the left extend into the posterosuperior vertebral body.  2. Postoperative changes including anterior and posterior instrumented fusion at  L5-S1. No hardware failure. No advanced spinal canal or neural foraminal  narrowing at any level.     MRI BRAIN " 9/13/2021  1. No evidence of acute intracranial hemorrhage, mass effect, or infarction.  2. Mild nonspecific white matter changes.  3. Mild brain parenchymal volume loss.     MRI CERVICAL SPINE 7/12/2021  1.  Mild central canal and left foraminal narrowing at C6-7  2.  Mild degenerative changes within the remainder of the cervical spine as above     MRI, MRA BRAIN 2/26/2021  HEAD MRI:   1.  Mild chronic small vessel ischemic changes and age-related changes.  2.  Otherwise normal brain MRI.    HEAD MRA:   1.  Normal MRA [facility].    NECK MRA:  1.  Minimal plaque in the right carotid bulb, but no significant associated stenosis by NASCET criteria.  2.   Otherwise normal neck MRA.     MRI LUMBAR SPINE 9/23/2020  No new spondylolisthesis since 05/04/2020. Interval subacute  appearing superior L2 compression fracture and bilateral pedicle fractures.  Stable chronic healing fracture through the posterolateral left L3 vertebral  body and pedicle.     BRAIN PET 11/12/2021  FDG metabolic pattern of the brain demonstrates  hypometabolism in the parietal lobes (right greater then left) and  lesser loss in frontal, this pattern can be seen with early  Alzheimer's disease.     EMG 7/13/2021  1.  This is a normal electrodiagnostic study of bilateral lower extremities  2.  Specifically there is no electrodiagnostic evidence of a lumbar radiculopathy, plexopathy or peripheral apathy     NEUROPSYCHOLOGICAL EVALUATION 10/18/2021  DIAGNOSIS:  Unspecified Mild Neurocognitive Disorder     Adjustment Disorder with Anxiety      RECOMMENDATIONS:  1) Ongoing neurologic care and monitoring is recommended.      2) If not medically contraindicated, Ms. De La Rosa may benefit from an empirical trial of a cognitive-enhancing medication.      3) Consider a PET scan to help clarify etiology of Ms. De La Rosa's cognitive decline.     4) Consider a referral to Medication Therapy Management (pharmacist) for a review of Ms. De La Rosa's current regimen and  to provide recommendations in order to minimize any unwanted side effects.     5) Additional caregiving support for Ms. De La Rosa is crucial. There are several cultural factors at play, but I would highly encourage Ms. De La Rosa to consider formal in-home care giving services to assist her so that she can have some respite. Some other ideas for increased support include hiring a caregiver to come at night so that she can sleep, hiring a cook who is familiar at preparing [place] cuisine, or even having regularly scheduled times of respite when her daughters can attend to their dad so she can engage in her own self-care activities (e.g., every Saturday and Sunday from 12-5 PM).     6) Although the patient has declined this in the past, I would encourage her to consider establishing care with a psychotherapist for additional support. If she prefers a provider who is more familiar with her culture, there are a couple of options that I found in the community:  Jenny Mak of [facility] in [place] ([phone])  Fran Gutierrez of [employer] in [facility] ([phone])     7) Given her mild memory issues, occasional oversight is encouraged with more complex and/or novel tasks to ensure accuracy and safety (e.g., medication and financial management). She should allow herself extra time to complete these types of tasks as well.     8) The patient is encouraged to utilize cognitive compensatory strategies in daily life, including utilizing note pads, checklists, to-do lists, a calendar/planner, labeled alarm reminders, a GPS, a pillbox, and maintaining a daily morning and nighttime routine and an organized living/work environment.     9) Ms. De La Rosa is strongly encouraged to adhere closely to her medication regimen to help keep her cerebrovascular risk factors (e.g., hyperlipidemia) and other medical conditions well controlled. This may help prevent further exacerbations in  cognitive decline in the future.     10) Neuropsychological follow-up is recommended in 12-18 months (or as clinically indicated) in order to monitor her cognitive status, help to clarify etiology, and update recommendations. The current test data can be used as a baseline to which future comparisons can be made.     NEUROPSYCHOLOGICAL EVALUATION 3/25/2021  No Cognitive Disorder     Adjustment Disorder with Anxiety     RECOMMENDATIONS:  1) Stress management strategies are strongly encouraged, as improvement in emotional distress will lead to perceived improvements in her cognitive functioning (and even her physical pain level to some degree) over time. Such strategies may include:  Participating in individual psychotherapy. If the patient is amenable, I will refer her to our psychologist (Dr. Flavia Rivera). I will have our schedulers call her to set up an appointment, or she can always call our clinic at a later time to establish care ([phone]).  Consider an adjustment to her medications for her mood/anxiety. She experienced side effects with increased dosing of Cymbalta; as such, consider an alternative medication for anxiety (e.g., a selective serotonin reuptake inhibitor). This may help with her daytime anxiety as well as the heightened anxiety she experiences at night (she fears her sleep medications will make her too sedated to respond to her 's needs, but her anxiety about this keeps her from sleeping and increases her hypervigilance at night as well). This recommendation will be deferred to her PCP.  Utilizing relaxation practices. I will mail her a handout with some techniques she may try on her own.  Attending caregiving support groups. She may find these through the Alzheimer's Association website or hotline (https://www.alz.org/ or by calling 704-359-8033 ) or through the [employer] Line ([place]://[place].gov[place]-linkage-line/ or by calling 025-946-4516).     2) In addition, a referral to a   or  or calling the Senior Linkage Line may be helpful to assist her in seeking out respite services or formal in-home care giving services to allow Ms. De La Rosa to take a break or re-engage in her hobbies and interests.     3) Ms. De La Rosa is also encouraged to follow up with her medical providers for ongoing pain management. Improvements in her pain may help her stress level and cognitive functioning to some degree.     4) Ms. De La Rosa should adhere closely to her medication regimen in order to manage her cerebrovascular risk factors (hyperlipidemia), pain, and mood. This may help to prevent future cognitive decline.     5) The patient is encouraged to remain physically, socially, and mentally active for optimal brain health. This may also help to improve her emotional wellbeing.     6) From a neurocognitive standpoint, I have no concerns about the patient's ability to independently perform IADLs or care for her .      7) Cognitive strategies may be helpful for her own reassurance. These may include utilizing note pads, checklists, to-do lists, alarm reminders, a detailed calendar/planner, a GPS, a pillbox, and maintaining a daily morning and nighttime routine and an organized living environment. She should avoid multi-tasking when possible and should attempt to reduce as much distraction as possible while performing complex or important tasks.     8) Neuropsychological follow-up is not warranted at this time. However, the current test data can now serve as a baseline should a repeat assessment be indicated in the future           PERTINENT LABS  Following labs were reviewed:  Admission on 01/18/2022, Discharged on 01/19/2022   Component Date Value     Hemoglobin 01/19/2022 8.9 (A)     Creatinine 01/19/2022 0.52      GFR Estimate 01/19/2022 >90      Glucose 01/19/2022 89      Patient Fasting > 8hrs? 01/19/2022 No    Lab on 01/14/2022   Component Date Value     SARS CoV2 PCR 01/14/2022  Negative          VIDEO VISIT DETAILS  Patient is being evaluated via a billable video visit.   Patient would like the video invitation sent by: [x]E-mail  []SMS   Type of service: Video Visit  Video Start Time: 1:13 PM  Video End Time (time video stopped): 1:37 PM  Originating Location (Patient Location): Patient's Home  Distant Location (provider location): [facility] [place]   Mode of Communication: Video Conference via [x][facility] []Doximity    Total time spent for face to face visit, reviewing labs/imaging studies, counseling and coordination of care was: 30 Minutes spent on the date of the encounter doing chart review, review of outside records, review of test results, interpretation of tests, patient visit, documentation and discussion with family     This note was dictated using voice recognition software.  Any grammatical or context distortions are unintentional and inherent to the software.    Orders Placed This Encounter   Procedures     Hepatic function panel     Pain Management Referral      New Prescriptions    No medications on file     Modified Medications    Modified Medication Previous Medication    DONEPEZIL (ARICEPT) 10 MG TABLET donepezil (ARICEPT) 5 MG tablet       Take 1 tablet (10 mg) by mouth At Bedtime    TAKE 1 TABLET BY MOUTH AT BEDTIME

## 2022-03-16 NOTE — NURSING NOTE
Chief Complaint   Patient presents with     Dementia     Cervical radiculopathy     Leonora Martinez CMA on 3/16/2022 at 1:04 PM

## 2022-04-06 DIAGNOSIS — F02.80 LATE ONSET ALZHEIMER'S DEMENTIA WITHOUT BEHAVIORAL DISTURBANCE (H): ICD-10-CM

## 2022-04-06 DIAGNOSIS — G30.1 LATE ONSET ALZHEIMER'S DEMENTIA WITHOUT BEHAVIORAL DISTURBANCE (H): ICD-10-CM

## 2022-04-06 RX ORDER — DONEPEZIL HYDROCHLORIDE 5 MG/1
TABLET, FILM COATED ORAL
Qty: 90 TABLET | Refills: 0 | OUTPATIENT
Start: 2022-04-06

## 2022-04-06 NOTE — TELEPHONE ENCOUNTER
Refill request for Aricept. Pt last seen 3/16/22 and isn't due for follow up until 9/2022. At last visit pt's pharmacy was sent a year supply of medication. Will deny for refills on file.     Kristie Schulte RN on 4/6/2022 at 10:28 AM

## 2022-05-02 ENCOUNTER — TELEPHONE (OUTPATIENT)
Dept: VASCULAR SURGERY | Facility: CLINIC | Age: 77
End: 2022-05-02
Payer: MEDICARE
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
## 2022-05-08 ENCOUNTER — MYC MEDICAL ADVICE (OUTPATIENT)
Dept: NEUROLOGY | Facility: CLINIC | Age: 77
End: 2022-05-08
Payer: MEDICARE

## 2022-05-11 DIAGNOSIS — F02.80 LATE ONSET ALZHEIMER'S DEMENTIA WITHOUT BEHAVIORAL DISTURBANCE (H): ICD-10-CM

## 2022-05-11 DIAGNOSIS — G30.1 LATE ONSET ALZHEIMER'S DEMENTIA WITHOUT BEHAVIORAL DISTURBANCE (H): ICD-10-CM

## 2022-05-11 RX ORDER — DONEPEZIL HYDROCHLORIDE 5 MG/1
TABLET, FILM COATED ORAL
Qty: 90 TABLET | Refills: 0 | OUTPATIENT
Start: 2022-05-11

## 2022-05-11 NOTE — TELEPHONE ENCOUNTER
[facility] Call Center    Phone Message    May a detailed message be left on voicemail: yes     Reason for Call: Other: Pt called to schedule virtual appt to discuss symptoms per Dr. Vincent direction\.      Writer unable to schedule any appt prior to the one already scheduled on 9/16/22.    Please review and call Pt back at [phone] to schedule this appt per note form Harrison Simon.      Action Taken: Message routed to:  Other: [facility]  Neurology    Travel Screening: Not Applicable

## 2022-05-11 NOTE — TELEPHONE ENCOUNTER
Refill request for Aricept. Pt was last seen 3/16/22 and at this time pt's pharmacy was sent a 90 day supply with 3 refills. Will deny for refills on file.     Kristie Schulte RN on 5/11/2022 at 2:50 PM

## 2022-05-11 NOTE — TELEPHONE ENCOUNTER
I spoke with Kurt and have added her on for tomorrow virtually 9am  Leonora Martinez, DELIA on 5/11/2022 at 1:42 PM

## 2022-05-12 ENCOUNTER — VIRTUAL VISIT (OUTPATIENT)
Dept: NEUROLOGY | Facility: CLINIC | Age: 77
End: 2022-05-12
Payer: MEDICARE

## 2022-05-12 VITALS — HEIGHT: 64 IN | BODY MASS INDEX: 23.39 KG/M2 | WEIGHT: 137 LBS

## 2022-05-12 DIAGNOSIS — F02.80 LATE ONSET ALZHEIMER'S DEMENTIA WITHOUT BEHAVIORAL DISTURBANCE (H): Primary | ICD-10-CM

## 2022-05-12 DIAGNOSIS — G44.211 INTRACTABLE EPISODIC TENSION-TYPE HEADACHE: ICD-10-CM

## 2022-05-12 DIAGNOSIS — M48.061 SPINAL STENOSIS OF LUMBAR REGION WITHOUT NEUROGENIC CLAUDICATION: ICD-10-CM

## 2022-05-12 DIAGNOSIS — G30.1 LATE ONSET ALZHEIMER'S DEMENTIA WITHOUT BEHAVIORAL DISTURBANCE (H): Primary | ICD-10-CM

## 2022-05-12 PROCEDURE — 99214 OFFICE O/P EST MOD 30 MIN: CPT | Mod: 95 | Performed by: PSYCHIATRY & NEUROLOGY

## 2022-05-12 NOTE — PROGRESS NOTES
NEUROLOGY FOLLOW UP VISIT  NOTE       [facility] [place]  [address] [place] [address]  Tel: [phone]  Fax: [phone]  [email or web address]     Leonardo De La Rosa,  1945, MRN [id number]  PCP: Cassie Ortega  Date: 2022      ASSESSMENT & PLAN     Visit Diagnosis  Late onset Alzheimer's dementia without behavioral disturbance (H)  Spinal stenosis of lumbar region without neurogenic claudication  Intractable episodic tension-type headache     Gait instability  76-year-old female with history of SNHL, osteoporosis, GERD who was seen due to frequent falls that caused her family quite a bit of concern.  She was previously evaluated at [facility] and had MRI of the cervical and lumbar spine that was unremarkable and did not show any high-grade stenosis.  There were subacute to chronic appearing fractures of L2 resulting in superior endplate compression and incompletely healed bilateral pedicle fracture.  They were postoperative changes at L5-S1 due to previous fusion.  It was felt that her weakness was due to spinal canal stenosis and issues and was referred for physical therapy but she did not pursue it aggressively and had multiple falls.  She claims she tends to get anxious and frazzled due to her  condition and does not pay attention and fall down.  However she clearly has symptoms to suggest proximal weakness in the lower extremity likely due to disuse but I have recommended:    1.  MRI lumbar spine  2.  EMG lower extremity  3.  Check CK and aldolase  4.  Follow-up after the EMG    Late onset Alzheimer's dementia without behavioral disturbance  Patient is a 76-year-old female with history of SNHL, osteoporosis, GERD who is being followed in our clinic for progressive cognitive decline.  She had extensive work-up twice for her cognitive issues that included MRI brain, MRA, neuropsychological testing and lab work that were normal.  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  Recent repeat work-up included normal MRI and repeat lab work and as initial neuropsychological testing was done virtually repeat neuropsychological testing was done in person that suggested a mild unspecified neurocognitive disorder with adjustment disorder and anxiety.  Brain PET scan was performed that showed hypometabolism in the parietal lobe (right greater than left) and lesser loss in frontal region.  Such pattern can be seen in early Alzheimer's dementia.  Dose was increased to 10 mg daily but she has not noticed any significant change in her memory.  I have not made any changes.    Cervical radiculopathy  Patient is complaining of neck pain that at times radiates to the right side of the head.  She recently had lab work done that includes normal sed rate but elevated C-reactive protein.  She had greater occipital nerve block and also epidural injection recently by orthopedics but does not seem to help.  She is on Cymbalta, Lyrica and also takes Dilaudid.  Due to significant discomfort I had recommended seeing someone at the pain clinic.  She was not able to get an appointment in [facility] and was planning on seeing someone at [facility]    Thank you again for this referral, please feel free to contact me if you have any questions.    Augustin Terry MD  [facility]  (Formerly, [facility]     HISTORY OF PRESENT ILLNESS     Patient is a 76-year-old female with history of SNHL, osteoporosis, and GERD who is being followed in our clinic for late onset Alzheimer's dementia, intractable tension type headaches and frequent falls.  This visit was prompted due to multiple recent falls.  Her daughter Lucie sent a MyChart message that they have been to urgent care 3 times as she had multiple falls.  She was concerned of medication was  "resulting in gait difficulty.  She has history of frequent falls for which she was seen at [facility] and had MRI of the cervical spine, lumbar spine and EMG that were unremarkable and did not show any high-grade stenosis.  There was subacute to chronic appearing fractures of L2 resulting in superior endplate compression and incompletely healed bilateral pedicle fractures.  In addition there were postoperative changes at L5-S1 due to previous fusion.  It was felt her weakness was related to hip flexor involvement due to spinal canal stenosis and was referred for physical therapy.  Although she was instructed to use a cane she avoids using it and had multiple falls resulting in fractures and bruises.  According to patient she has started using cane but due to fracturing her right arm she is using it in the left hand but does not find it to be very effective and cannot balance herself.  She briefly tried walker but it was \"no good\".  She finds it difficult from getting up from a sitting position and also getting up from bed at times is difficult.  She denies any neck flexors and extensor weakness.    Previously she had also complained of some neck pain with radiation down into her right side.  CT cervical spine showed mild degenerative changes but no significant spinal canal stenosis.  She was seen by orthopedics and had greater occipital nerve block and an epidural injection that did not help.  She is on pregabalin, Cymbalta and Dilaudid she was also evaluated by rheumatology and they prescribed methotrexate that she did not fill.    Her major issue is late onset Alzheimer's dementia.  She had extensive work-up twice for neurocognitive decline that included MRI brain, MRA, neuropsychological testing and the lab work that were normal.  She is under a lot of stress and at times frazzled due to her 's medical condition and social situation that she finds herself in.  Her  had stroke and dementia and due " to his demands patient tends to get frazzled easily.  Her repeat neuropsychological testing was consistent with mild neurocognitive disorder with adjustment disorder and anxiety.  Brain PET scan showed hypometabolism in the parietal lobe right greater than left and lesser loss in the frontal region.  This pattern is usually seen in early Alzheimer's dementia.  She was started on Aricept.       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    Surgical History:  She  has a past surgical history that includes TOTAL KNEE ARTHROPLASTY; Foot surgery; Hysterectomy total abdominal, bilateral salpingo-oophorectomy, combined; Arthrodesis foot (4/22/2013); Bunionectomy lapidus with tarsal metatarsal (TMT) fusion (4/22/2013); Hysterectomy; knee surgery; Foot surgery; and Arthroplasty knee (Right, 1/18/2022).     SOCIAL HISTORY     Reviewed, and she  reports that she has never smoked. She has never used smokeless tobacco. She reports that she does not drink alcohol and does not use drugs.     FAMILY HISTORY     Reviewed, and family history includes Coronary Artery Disease in her mother.     ALLERGIES     Allergies   Allergen Reactions     Estratest H.S. Other (See Comments) and Unknown     Other reaction(s): Other (see comments)  Increase lipids  Outside source did not list reaction  Increase lipids    "    Hydrocodone      Oxycodone      Propofol Nausea and Vomiting     Outside source states \"injectable and inhaled\"  Outside source states \"injectable and inhaled\"  Outside source states \"injectable and inhaled\"  Outside source states \"injectable and inhaled\"       Reglan      Estrogens Rash     Other reaction(s): Other (see comments)  Outside source did not list reaction  Estrogen patches, from patch only, ( Adhesive )  does tolerate estrogen other forms        Penicillins Nausea and Vomiting and Nausea     Other reaction(s): GI intolerance, GI Upset  Patient states upset stomach.  Patient states upset stomach.  Patient states upset stomach.  Patient states upset stomach.           REVIEW OF SYSTEMS     A 12 point review of system was performed and was negative except as outlined in the history of present illness.     HOME MEDICATIONS     Current Outpatient Rx   Medication Sig Dispense Refill     acetaminophen (TYLENOL) 325 MG tablet Take 2 tablets (650 mg) by mouth every 4 hours as needed for other (mild pain) 100 tablet 0     ammonium lactate (AMLACTIN) 12 % external cream Apply topically daily as needed for dry skin       aspirin (ASA) 325 MG EC tablet Take 1 tablet (325 mg) by mouth daily 30 tablet 0     azelastine (ASTELIN) 0.1 % nasal spray Spray 1 spray into both nostrils every morning       calcium carbonate-vitamin D 600-200 MG-UNIT TABS Take 1 tablet by mouth daily.       denosumab (PROLIA) 60 MG/ML SOSY injection Inject 60 mg Subcutaneous every 6 months        donepezil (ARICEPT) 10 MG tablet Take 1 tablet (10 mg) by mouth At Bedtime 90 tablet 3     DULoxetine (CYMBALTA) 60 MG capsule Take 60 mg by mouth daily        famotidine (PEPCID) 40 MG tablet Take 20 mg by mouth every evening        ferrous sulfate (FEROSUL) 325 (65 Fe) MG tablet Take 325 mg by mouth every morning       fluocin-hydroquinone-tretinoin (TRI-RODNEY) 0.01-4-0.05 % CREA Externally apply topically At Bedtime 30 g 3     fluticasone " "(FLONASE) 50 MCG/ACT nasal spray INSTILL 1 SPRAY INTO EACH NOSTRIL ONCE DAILY AS NEEDED FOR RHINITIS       folic acid (FOLVITE) 1 MG tablet Take 1 tablet (1 mg) by mouth daily 90 tablet 3     glucosamine-chondroitin 500-400 MG CAPS Take 1 capsule by mouth daily.       HYDROmorphone (DILAUDID) 2 MG tablet Take 1 tablet (2 mg) by mouth every 4 hours as needed for moderate to severe pain 40 tablet 0     levocetirizine (XYZAL) 5 MG tablet Take 5 mg by mouth every morning       losartan (COZAAR) 100 MG tablet Take 100 mg by mouth daily        methocarbamol (ROBAXIN) 500 MG tablet TAKE 1 TABLET BY MOUTH 3 TIMES A DAY       multivitamin w/minerals (THERA-VIT-M) tablet Take 1 tablet by mouth daily.       Omega-3 Fatty Acids (OMEGA-3 FISH OIL) 1200 MG CAPS Take 1,200 g by mouth every 7 days on Friday       omeprazole (PRILOSEC) 20 MG DR capsule Take 20 mg by mouth every morning        pravastatin (PRAVACHOL) 20 MG tablet Take 20 mg by mouth daily        pregabalin (LYRICA) 75 MG capsule Take 75 mg by mouth 2 times daily Patient taking 50 mg in am and 75 mg in pm       senna-docusate (SENOKOT-S/PERICOLACE) 8.6-50 MG tablet Take 1-2 tablets by mouth 2 times daily Take while on oral narcotics to prevent or treat constipation. 30 tablet 0     Vitamin D3 (CHOLECALCIFEROL) 25 mcg (1000 units) tablet Take 1,000 Units by mouth daily        zolpidem (AMBIEN) 5 MG tablet Take 5 mg by mouth At Bedtime            PHYSICAL EXAM     Vital signs  Ht 1.626 m (5' 4\")   Wt 62.1 kg (137 lb)   BMI 23.52 kg/m      Weight:   137 lbs 0 oz    Patient is alert and oriented x3 speech was normal with no dysarthria or aphasia.  Extraocular movements are intact face symmetrical.  She moves all 4 extremities.  Rest of the exam was not possible on a video visit     PERTINENT DIAGNOSTIC STUDIES     Following studies were reviewed:     CT CERVICAL SPINE 11/26/2021  1.  Variable degrees of mild degenerative change throughout the cervical spine. No evidence " of significant spinal canal stenosis. Variable degrees of mild foraminal narrowing due to uncovertebral joint and facet hypertrophic change at the above-mentioned   levels. Please see body of report for details as above. No acute findings.     CT BRAIN 12/17/2020  1.  No acute intracranial process.  2.  Minimal chronic small vessel ischemic disease.     CT LUMBAR SPINE 9/23/2020  1. New but subacute to chronic appearing fractures of L2, including mild  superior endplate compression and incompletely healed bilateral pedicle  fractures, which on the left extend into the posterosuperior vertebral body.  2. Postoperative changes including anterior and posterior instrumented fusion at  L5-S1. No hardware failure. No advanced spinal canal or neural foraminal  narrowing at any level.     MRI BRAIN 9/13/2021  1. No evidence of acute intracranial hemorrhage, mass effect, or infarction.  2. Mild nonspecific white matter changes.  3. Mild brain parenchymal volume loss.     MRI CERVICAL SPINE 7/12/2021  1.  Mild central canal and left foraminal narrowing at C6-7  2.  Mild degenerative changes within the remainder of the cervical spine as above     MRI, MRA BRAIN 2/26/2021  HEAD MRI:   1.  Mild chronic small vessel ischemic changes and age-related changes.  2.  Otherwise normal brain MRI.    HEAD MRA:   1.  Normal MRA [place].    NECK MRA:  1.  Minimal plaque in the right carotid bulb, but no significant associated stenosis by NASCET criteria.  2.   Otherwise normal neck MRA.     MRI LUMBAR SPINE 9/23/2020  No new spondylolisthesis since 05/04/2020. Interval subacute  appearing superior L2 compression fracture and bilateral pedicle fractures.  Stable chronic healing fracture through the posterolateral left L3 vertebral  body and pedicle.     BRAIN PET 11/12/2021  FDG metabolic pattern of the brain demonstrates  hypometabolism in the parietal lobes (right greater then left) and  lesser loss in frontal, this pattern can  be seen with early  Alzheimer's disease.     EMG 7/13/2021  1.  This is a normal electrodiagnostic study of bilateral lower extremities  2.  Specifically there is no electrodiagnostic evidence of a lumbar radiculopathy, plexopathy or peripheral apathy     NEUROPSYCHOLOGICAL EVALUATION 10/18/2021  DIAGNOSIS:  Unspecified Mild Neurocognitive Disorder     Adjustment Disorder with Anxiety      RECOMMENDATIONS:  1) Ongoing neurologic care and monitoring is recommended.      2) If not medically contraindicated, Ms. De La Rosa may benefit from an empirical trial of a cognitive-enhancing medication.      3) Consider a PET scan to help clarify etiology of Ms. De La Rosa's cognitive decline.     4) Consider a referral to Medication Therapy Management (pharmacist) for a review of Ms. De La Rosa's current regimen and to provide recommendations in order to minimize any unwanted side effects.     5) Additional caregiving support for Ms. De La Rosa is crucial. There are several cultural factors at play, but I would highly encourage Ms. De La Rosa to consider formal in-home care giving services to assist her so that she can have some respite. Some other ideas for increased support include hiring a caregiver to come at night so that she can sleep, hiring a cook who is familiar at preparing [place] cuisine, or even having regularly scheduled times of respite when her daughters can attend to their dad so she can engage in her own self-care activities (e.g., every Saturday and Sunday from 12-5 PM).     6) Although the patient has declined this in the past, I would encourage her to consider establishing care with a psychotherapist for additional support. If she prefers a provider who is more familiar with her culture, there are a couple of options that I found in the community:  Jenny Mak of [facility] in [place] ([phone])  Fran Gutierrez of [employer] in [facility] ([phone])     7) Given her mild  memory issues, occasional oversight is encouraged with more complex and/or novel tasks to ensure accuracy and safety (e.g., medication and financial management). She should allow herself extra time to complete these types of tasks as well.     8) The patient is encouraged to utilize cognitive compensatory strategies in daily life, including utilizing note pads, checklists, to-do lists, a calendar/planner, labeled alarm reminders, a GPS, a pillbox, and maintaining a daily morning and nighttime routine and an organized living/work environment.     9) Ms. De La Rosa is strongly encouraged to adhere closely to her medication regimen to help keep her cerebrovascular risk factors (e.g., hyperlipidemia) and other medical conditions well controlled. This may help prevent further exacerbations in cognitive decline in the future.     10) Neuropsychological follow-up is recommended in 12-18 months (or as clinically indicated) in order to monitor her cognitive status, help to clarify etiology, and update recommendations. The current test data can be used as a baseline to which future comparisons can be made.     NEUROPSYCHOLOGICAL EVALUATION 3/25/2021  No Cognitive Disorder     Adjustment Disorder with Anxiety     RECOMMENDATIONS:  1) Stress management strategies are strongly encouraged, as improvement in emotional distress will lead to perceived improvements in her cognitive functioning (and even her physical pain level to some degree) over time. Such strategies may include:  Participating in individual psychotherapy. If the patient is amenable, I will refer her to our psychologist (Dr. Flavia Rivera). I will have our schedulers call her to set up an appointment, or she can always call our clinic at a later time to establish care ([phone]).  Consider an adjustment to her medications for her mood/anxiety. She experienced side effects with increased dosing of Cymbalta; as such, consider an alternative medication for anxiety (e.g.,  a selective serotonin reuptake inhibitor). This may help with her daytime anxiety as well as the heightened anxiety she experiences at night (she fears her sleep medications will make her too sedated to respond to her 's needs, but her anxiety about this keeps her from sleeping and increases her hypervigilance at night as well). This recommendation will be deferred to her PCP.  Utilizing relaxation practices. I will mail her a handout with some techniques she may try on her own.  Attending caregiving support groups. She may find these through the Alzheimer's Association website or hotline (https://www.alz.org/ or by calling 466-251-7998 ) or through the [employer] ([place]://[place].gov/[employer]/ or by calling 176-537-2483).     2) In addition, a referral to a  or  or calling the [employer] Line may be helpful to assist her in seeking out respite services or formal in-home care giving services to allow Ms. De La Rosa to take a break or re-engage in her hobbies and interests.     3) Ms. De La Rosa is also encouraged to follow up with her medical providers for ongoing pain management. Improvements in her pain may help her stress level and cognitive functioning to some degree.     4) Ms. De La Rosa should adhere closely to her medication regimen in order to manage her cerebrovascular risk factors (hyperlipidemia), pain, and mood. This may help to prevent future cognitive decline.     5) The patient is encouraged to remain physically, socially, and mentally active for optimal brain health. This may also help to improve her emotional wellbeing.     6) From a neurocognitive standpoint, I have no concerns about the patient's ability to independently perform IADLs or care for her .      7) Cognitive strategies may be helpful for her own reassurance. These may include utilizing note pads, checklists, to-do lists, alarm reminders, a detailed calendar/planner, a GPS, a pillbox, and  maintaining a daily morning and nighttime routine and an organized living environment. She should avoid multi-tasking when possible and should attempt to reduce as much distraction as possible while performing complex or important tasks.     8) Neuropsychological follow-up is not warranted at this time. However, the current test data can now serve as a baseline should a repeat assessment be indicated in the future     PERTINENT LABS  Following labs were reviewed:  No visits with results within 3 Month(s) from this visit.   Latest known visit with results is:   Admission on 01/18/2022, Discharged on 01/19/2022   Component Date Value     Hemoglobin 01/19/2022 8.9 (A)     Creatinine 01/19/2022 0.52      GFR Estimate 01/19/2022 >90      Glucose 01/19/2022 89      Patient Fasting > 8hrs? 01/19/2022 No       VIDEO VISIT DETAILS  Patient is being evaluated via a billable video visit.   Patient would like the video invitation sent by: [x]E-mail  [][employer]   Type of service: Video Visit  Video Start Time: 8:51 AM  Video End Time (time video stopped): 9:00 AM  Originating Location (Patient Location): Patient's Home  Distant Location (provider location): [facility] [place]   Mode of Communication: Video Conference via [x][employer] [][employer]     Total time spent for face to face visit, reviewing labs/imaging studies, counseling and coordination of care was: 30 Minutes spent on the date of the encounter doing chart review, review of outside records, review of test results, interpretation of tests, patient visit, documentation and discussion with family     This note was dictated using voice recognition software.  Any grammatical or context distortions are unintentional and inherent to the software.    Orders Placed This Encounter   Procedures     MR Lumbar Spine w/o Contrast     CK total     Aldolase     EMG      New Prescriptions    No medications on file     Modified Medications    No medications on file

## 2022-05-12 NOTE — LETTER
2022         RE: Leonardo De La Rosa  [address] [facility] [address]        Dear Colleague,    Thank you for referring your patient, Leonardo De La Rosa, to the [facility] [place]. Please see a copy of my visit note below.    NEUROLOGY FOLLOW UP VISIT  NOTE       [facility] [place]  [address] [place] [address]  Tel: [phone]  Fax: [phone]  [email or web address]     Leonardo De La Rosa,  1945, MRN [id number]  PCP: Cassie Ortega  Date: 2022      ASSESSMENT & PLAN     Visit Diagnosis  1. Late onset Alzheimer's dementia without behavioral disturbance (H)  2. Spinal stenosis of lumbar region without neurogenic claudication  3. Intractable episodic tension-type headache     Gait instability  76-year-old female with history of SNHL, osteoporosis, GERD who was seen due to frequent falls that caused her family quite a bit of concern.  She was previously evaluated at [facility] and had MRI of the cervical and lumbar spine that was unremarkable and did not show any high-grade stenosis.  There were subacute to chronic appearing fractures of L2 resulting in superior endplate compression and incompletely healed bilateral pedicle fracture.  They were postoperative changes at L5-S1 due to previous fusion.  It was felt that her weakness was due to spinal canal stenosis and issues and was referred for physical therapy but she did not pursue it aggressively and had multiple falls.  She claims she tends to get anxious and frazzled due to her  condition and does not pay attention and fall down.  However she clearly has symptoms to suggest proximal weakness in the lower extremity likely due to disuse but I have recommended:    1.  MRI lumbar spine  2.  EMG lower extremity  3.  Check CK and aldolase  4.  Follow-up after the EMG    Late onset Alzheimer's dementia without behavioral disturbance  Patient is a 76-year-old female with history  of SNHL, osteoporosis, GERD who is being followed in our clinic for progressive cognitive decline.  She had extensive work-up twice for her cognitive issues that included MRI brain, MRA, neuropsychological testing and lab work that were normal.  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  Recent repeat work-up included normal MRI and repeat lab work and as initial neuropsychological testing was done virtually repeat neuropsychological testing was done in person that suggested a mild unspecified neurocognitive disorder with adjustment disorder and anxiety.  Brain PET scan was performed that showed hypometabolism in the parietal lobe (right greater than left) and lesser loss in frontal region.  Such pattern can be seen in early Alzheimer's dementia.  Dose was increased to 10 mg daily but she has not noticed any significant change in her memory.  I have not made any changes.    Cervical radiculopathy  Patient is complaining of neck pain that at times radiates to the right side of the head.  She recently had lab work done that includes normal sed rate but elevated C-reactive protein.  She had greater occipital nerve block and also epidural injection recently by orthopedics but does not seem to help.  She is on Cymbalta, Lyrica and also takes Dilaudid.  Due to significant discomfort I had recommended seeing someone at the pain clinic.  She was not able to get an appointment in [employer] system and was planning on seeing someone at [facility]    Thank you again for this referral, please feel free to contact me if you have any questions.    Augustin Terry MD  [facility]  (Formerly, [facility]     HISTORY OF PRESENT ILLNESS     Patient is a 76-year-old female with history of SNHL, osteoporosis, and GERD who is being followed in our clinic for late onset Alzheimer's dementia,  "intractable tension type headaches and frequent falls.  This visit was prompted due to multiple recent falls.  Her daughter Lucie sent a [employer] message that they have been to urgent care 3 times as she had multiple falls.  She was concerned of medication was resulting in gait difficulty.  She has history of frequent falls for which she was seen at [facility] and had MRI of the cervical spine, lumbar spine and EMG that were unremarkable and did not show any high-grade stenosis.  There was subacute to chronic appearing fractures of L2 resulting in superior endplate compression and incompletely healed bilateral pedicle fractures.  In addition there were postoperative changes at L5-S1 due to previous fusion.  It was felt her weakness was related to hip flexor involvement due to spinal canal stenosis and was referred for physical therapy.  Although she was instructed to use a cane she avoids using it and had multiple falls resulting in fractures and bruises.  According to patient she has started using cane but due to fracturing her right arm she is using it in the left hand but does not find it to be very effective and cannot balance herself.  She briefly tried walker but it was \"no good\".  She finds it difficult from getting up from a sitting position and also getting up from bed at times is difficult.  She denies any neck flexors and extensor weakness.    Previously she had also complained of some neck pain with radiation down into her right side.  CT cervical spine showed mild degenerative changes but no significant spinal canal stenosis.  She was seen by orthopedics and had greater occipital nerve block and an epidural injection that did not help.  She is on pregabalin, Cymbalta and Dilaudid she was also evaluated by rheumatology and they prescribed methotrexate that she did not fill.    Her major issue is late onset Alzheimer's dementia.  She had extensive work-up twice for neurocognitive decline that included " MRI brain, MRA, neuropsychological testing and the lab work that were normal.  She is under a lot of stress and at times frazzled due to her 's medical condition and social situation that she finds herself in.  Her  had stroke and dementia and due to his demands patient tends to get frazzled easily.  Her repeat neuropsychological testing was consistent with mild neurocognitive disorder with adjustment disorder and anxiety.  Brain PET scan showed hypometabolism in the parietal lobe right greater than left and lesser loss in the frontal region.  This pattern is usually seen in early Alzheimer's dementia.  She was started on Aricept.       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    Surgical History:  She  has a past surgical history that includes TOTAL KNEE ARTHROPLASTY; Foot surgery; Hysterectomy total abdominal, bilateral salpingo-oophorectomy, combined; Arthrodesis foot (4/22/2013); Bunionectomy lapidus with tarsal metatarsal (TMT) fusion (4/22/2013); Hysterectomy; knee surgery; Foot surgery; and Arthroplasty knee (Right, 1/18/2022).     SOCIAL HISTORY     Reviewed, and she  reports that she has never smoked. She has never used smokeless tobacco. She reports that she does not drink alcohol and does not use drugs.     FAMILY HISTORY     Reviewed, and family history  "includes Coronary Artery Disease in her mother.     ALLERGIES     Allergies   Allergen Reactions     Estratest H.S. Other (See Comments) and Unknown     Other reaction(s): Other (see comments)  Increase lipids  Outside source did not list reaction  Increase lipids       Hydrocodone      Oxycodone      Propofol Nausea and Vomiting     Outside source states \"injectable and inhaled\"  Outside source states \"injectable and inhaled\"  Outside source states \"injectable and inhaled\"  Outside source states \"injectable and inhaled\"       Reglan      Estrogens Rash     Other reaction(s): Other (see comments)  Outside source did not list reaction  Estrogen patches, from patch only, ( Adhesive )  does tolerate estrogen other forms        Penicillins Nausea and Vomiting and Nausea     Other reaction(s): GI intolerance, GI Upset  Patient states upset stomach.  Patient states upset stomach.  Patient states upset stomach.  Patient states upset stomach.           REVIEW OF SYSTEMS     A 12 point review of system was performed and was negative except as outlined in the history of present illness.     HOME MEDICATIONS     Current Outpatient Rx   Medication Sig Dispense Refill     acetaminophen (TYLENOL) 325 MG tablet Take 2 tablets (650 mg) by mouth every 4 hours as needed for other (mild pain) 100 tablet 0     ammonium lactate (AMLACTIN) 12 % external cream Apply topically daily as needed for dry skin       aspirin (ASA) 325 MG EC tablet Take 1 tablet (325 mg) by mouth daily 30 tablet 0     azelastine (ASTELIN) 0.1 % nasal spray Spray 1 spray into both nostrils every morning       calcium carbonate-vitamin D 600-200 MG-UNIT TABS Take 1 tablet by mouth daily.       denosumab (PROLIA) 60 MG/ML SOSY injection Inject 60 mg Subcutaneous every 6 months        donepezil (ARICEPT) 10 MG tablet Take 1 tablet (10 mg) by mouth At Bedtime 90 tablet 3     DULoxetine (CYMBALTA) 60 MG capsule Take 60 mg by mouth daily        famotidine (PEPCID) 40 MG " "tablet Take 20 mg by mouth every evening        ferrous sulfate (FEROSUL) 325 (65 Fe) MG tablet Take 325 mg by mouth every morning       fluocin-hydroquinone-tretinoin (TRI-RODNEY) 0.01-4-0.05 % CREA Externally apply topically At Bedtime 30 g 3     fluticasone (FLONASE) 50 MCG/ACT nasal spray INSTILL 1 SPRAY INTO EACH NOSTRIL ONCE DAILY AS NEEDED FOR RHINITIS       folic acid (FOLVITE) 1 MG tablet Take 1 tablet (1 mg) by mouth daily 90 tablet 3     glucosamine-chondroitin 500-400 MG CAPS Take 1 capsule by mouth daily.       HYDROmorphone (DILAUDID) 2 MG tablet Take 1 tablet (2 mg) by mouth every 4 hours as needed for moderate to severe pain 40 tablet 0     levocetirizine (XYZAL) 5 MG tablet Take 5 mg by mouth every morning       losartan (COZAAR) 100 MG tablet Take 100 mg by mouth daily        methocarbamol (ROBAXIN) 500 MG tablet TAKE 1 TABLET BY MOUTH 3 TIMES A DAY       multivitamin w/minerals (THERA-VIT-M) tablet Take 1 tablet by mouth daily.       Omega-3 Fatty Acids (OMEGA-3 FISH OIL) 1200 MG CAPS Take 1,200 g by mouth every 7 days on Friday       omeprazole (PRILOSEC) 20 MG DR capsule Take 20 mg by mouth every morning        pravastatin (PRAVACHOL) 20 MG tablet Take 20 mg by mouth daily        pregabalin (LYRICA) 75 MG capsule Take 75 mg by mouth 2 times daily Patient taking 50 mg in am and 75 mg in pm       senna-docusate (SENOKOT-S/PERICOLACE) 8.6-50 MG tablet Take 1-2 tablets by mouth 2 times daily Take while on oral narcotics to prevent or treat constipation. 30 tablet 0     Vitamin D3 (CHOLECALCIFEROL) 25 mcg (1000 units) tablet Take 1,000 Units by mouth daily        zolpidem (AMBIEN) 5 MG tablet Take 5 mg by mouth At Bedtime            PHYSICAL EXAM     Vital signs  Ht 1.626 m (5' 4\")   Wt 62.1 kg (137 lb)   BMI 23.52 kg/m      Weight:   137 lbs 0 oz    Patient is alert and oriented x3 speech was normal with no dysarthria or aphasia.  Extraocular movements are intact face symmetrical.  She moves all 4 " extremities.  Rest of the exam was not possible on a video visit     PERTINENT DIAGNOSTIC STUDIES     Following studies were reviewed:     CT CERVICAL SPINE 11/26/2021  1.  Variable degrees of mild degenerative change throughout the cervical spine. No evidence of significant spinal canal stenosis. Variable degrees of mild foraminal narrowing due to uncovertebral joint and facet hypertrophic change at the above-mentioned   levels. Please see body of report for details as above. No acute findings.     CT BRAIN 12/17/2020  1.  No acute intracranial process.  2.  Minimal chronic small vessel ischemic disease.     CT LUMBAR SPINE 9/23/2020  1. New but subacute to chronic appearing fractures of L2, including mild  superior endplate compression and incompletely healed bilateral pedicle  fractures, which on the left extend into the posterosuperior vertebral body.  2. Postoperative changes including anterior and posterior instrumented fusion at  L5-S1. No hardware failure. No advanced spinal canal or neural foraminal  narrowing at any level.     MRI BRAIN 9/13/2021  1. No evidence of acute intracranial hemorrhage, mass effect, or infarction.  2. Mild nonspecific white matter changes.  3. Mild brain parenchymal volume loss.     MRI CERVICAL SPINE 7/12/2021  1.  Mild central canal and left foraminal narrowing at C6-7  2.  Mild degenerative changes within the remainder of the cervical spine as above     MRI, MRA BRAIN 2/26/2021  HEAD MRI:   1.  Mild chronic small vessel ischemic changes and age-related changes.  2.  Otherwise normal brain MRI.    HEAD MRA:   1.  Normal MRA [place].    NECK MRA:  1.  Minimal plaque in the right carotid bulb, but no significant associated stenosis by NASCET criteria.  2.   Otherwise normal neck MRA.     MRI LUMBAR SPINE 9/23/2020  No new spondylolisthesis since 05/04/2020. Interval subacute  appearing superior L2 compression fracture and bilateral pedicle fractures.  Stable chronic  healing fracture through the posterolateral left L3 vertebral  body and pedicle.     BRAIN PET 11/12/2021  FDG metabolic pattern of the brain demonstrates  hypometabolism in the parietal lobes (right greater then left) and  lesser loss in frontal, this pattern can be seen with early  Alzheimer's disease.     EMG 7/13/2021  1.  This is a normal electrodiagnostic study of bilateral lower extremities  2.  Specifically there is no electrodiagnostic evidence of a lumbar radiculopathy, plexopathy or peripheral apathy     NEUROPSYCHOLOGICAL EVALUATION 10/18/2021  DIAGNOSIS:  Unspecified Mild Neurocognitive Disorder     Adjustment Disorder with Anxiety      RECOMMENDATIONS:  1) Ongoing neurologic care and monitoring is recommended.      2) If not medically contraindicated, Ms. De La Rosa may benefit from an empirical trial of a cognitive-enhancing medication.      3) Consider a PET scan to help clarify etiology of Ms. De La Rosa's cognitive decline.     4) Consider a referral to Medication Therapy Management (pharmacist) for a review of Ms. De La Rosa's current regimen and to provide recommendations in order to minimize any unwanted side effects.     5) Additional caregiving support for Ms. De La Rosa is crucial. There are several cultural factors at play, but I would highly encourage Ms. De La Rosa to consider formal in-home care giving services to assist her so that she can have some respite. Some other ideas for increased support include hiring a caregiver to come at night so that she can sleep, hiring a cook who is familiar at preparing  cuisine, or even having regularly scheduled times of respite when her daughters can attend to their dad so she can engage in her own self-care activities (e.g., every Saturday and Sunday from 12-5 PM).     6) Although the patient has declined this in the past, I would encourage her to consider establishing care with a psychotherapist for additional support. If she prefers a provider who is more familiar  with her culture, there are a couple of options that I found in the community:    Jenny Mak of [facility] in [place] ([phone])    Fran Gutierrez of [employer] in [facility] ([phone])     7) Given her mild memory issues, occasional oversight is encouraged with more complex and/or novel tasks to ensure accuracy and safety (e.g., medication and financial management). She should allow herself extra time to complete these types of tasks as well.     8) The patient is encouraged to utilize cognitive compensatory strategies in daily life, including utilizing note pads, checklists, to-do lists, a calendar/planner, labeled alarm reminders, a GPS, a pillbox, and maintaining a daily morning and nighttime routine and an organized living/work environment.     9) Ms. De La Rosa is strongly encouraged to adhere closely to her medication regimen to help keep her cerebrovascular risk factors (e.g., hyperlipidemia) and other medical conditions well controlled. This may help prevent further exacerbations in cognitive decline in the future.     10) Neuropsychological follow-up is recommended in 12-18 months (or as clinically indicated) in order to monitor her cognitive status, help to clarify etiology, and update recommendations. The current test data can be used as a baseline to which future comparisons can be made.     NEUROPSYCHOLOGICAL EVALUATION 3/25/2021  No Cognitive Disorder     Adjustment Disorder with Anxiety     RECOMMENDATIONS:  1) Stress management strategies are strongly encouraged, as improvement in emotional distress will lead to perceived improvements in her cognitive functioning (and even her physical pain level to some degree) over time. Such strategies may include:  ? Participating in individual psychotherapy. If the patient is amenable, I will refer her to our psychologist (Dr. Flavia Rivera). I will have our schedulers call her to set up an appointment, or  she can always call our clinic at a later time to establish care ([phone]).  ? Consider an adjustment to her medications for her mood/anxiety. She experienced side effects with increased dosing of Cymbalta; as such, consider an alternative medication for anxiety (e.g., a selective serotonin reuptake inhibitor). This may help with her daytime anxiety as well as the heightened anxiety she experiences at night (she fears her sleep medications will make her too sedated to respond to her 's needs, but her anxiety about this keeps her from sleeping and increases her hypervigilance at night as well). This recommendation will be deferred to her PCP.  ? Utilizing relaxation practices. I will mail her a handout with some techniques she may try on her own.  ? Attending caregiving support groups. She may find these through the Alzheimer's Association website or hotline (https://www.alz.org/ or by calling 668-041-8397 ) or through the [employer] (https://[place].gov/OkCupid-ipvive-Yododo/ or by calling 534-321-8056).     2) In addition, a referral to a  or  or calling the [employer] Line may be helpful to assist her in seeking out respite services or formal in-home care giving services to allow Ms. De La Rosa to take a break or re-engage in her hobbies and interests.     3) Ms. De La Rosa is also encouraged to follow up with her medical providers for ongoing pain management. Improvements in her pain may help her stress level and cognitive functioning to some degree.     4) Ms. De La Rosa should adhere closely to her medication regimen in order to manage her cerebrovascular risk factors (hyperlipidemia), pain, and mood. This may help to prevent future cognitive decline.     5) The patient is encouraged to remain physically, socially, and mentally active for optimal brain health. This may also help to improve her emotional wellbeing.     6) From a neurocognitive standpoint, I have no concerns about the  patient's ability to independently perform IADLs or care for her .      7) Cognitive strategies may be helpful for her own reassurance. These may include utilizing note pads, checklists, to-do lists, alarm reminders, a detailed calendar/planner, a GPS, a pillbox, and maintaining a daily morning and nighttime routine and an organized living environment. She should avoid multi-tasking when possible and should attempt to reduce as much distraction as possible while performing complex or important tasks.     8) Neuropsychological follow-up is not warranted at this time. However, the current test data can now serve as a baseline should a repeat assessment be indicated in the future     PERTINENT LABS  Following labs were reviewed:  No visits with results within 3 Month(s) from this visit.   Latest known visit with results is:   Admission on 01/18/2022, Discharged on 01/19/2022   Component Date Value     Hemoglobin 01/19/2022 8.9 (A)     Creatinine 01/19/2022 0.52      GFR Estimate 01/19/2022 >90      Glucose 01/19/2022 89      Patient Fasting > 8hrs? 01/19/2022 No       VIDEO VISIT DETAILS  Patient is being evaluated via a billable video visit.   Patient would like the video invitation sent by: [x]E-mail  []Smile   Type of service: Video Visit  Video Start Time: 8:51 AM  Video End Time (time video stopped): 9:00 AM  Originating Location (Patient Location): Patient's Home  Distant Location (provider location): [facility] [place]   Mode of Communication: Video Conference via [x]Reddit []Doximity     Total time spent for face to face visit, reviewing labs/imaging studies, counseling and coordination of care was: 30 Minutes spent on the date of the encounter doing chart review, review of outside records, review of test results, interpretation of tests, patient visit, documentation and discussion with family       This note was dictated using voice recognition software.  Any grammatical or  context distortions are unintentional and inherent to the software.    Orders Placed This Encounter   Procedures     MR Lumbar Spine w/o Contrast     CK total     Aldolase     EMG      New Prescriptions    No medications on file     Modified Medications    No medications on file                     Again, thank you for allowing me to participate in the care of your patient.        Sincerely,        Augustin Terry MD

## 2022-05-12 NOTE — NURSING NOTE
Leonardo De La Rosa is a 76 year old female who presents for:  Chief Complaint   Patient presents with     RECHECK        Initial Vitals: No Vitals - Virtual Visit.    Nursing Comments:     Kavita Peguero, CMA

## 2022-05-16 NOTE — TELEPHONE ENCOUNTER
To my knowledge, [facility] does not have outpatient neurology and they send their patients to [facility].  Please check with someone if she can do it sooner

## 2022-05-23 ENCOUNTER — HOSPITAL ENCOUNTER (OUTPATIENT)
Dept: MRI IMAGING | Facility: HOSPITAL | Age: 77
Discharge: HOME | End: 2022-05-23
Attending: PSYCHIATRY & NEUROLOGY | Admitting: PSYCHIATRY & NEUROLOGY
Payer: MEDICARE

## 2022-05-23 DIAGNOSIS — M48.061 SPINAL STENOSIS OF LUMBAR REGION WITHOUT NEUROGENIC CLAUDICATION: ICD-10-CM

## 2022-05-23 PROCEDURE — 72148 MRI LUMBAR SPINE W/O DYE: CPT | Mod: MG

## 2022-05-24 ENCOUNTER — MYC MEDICAL ADVICE (OUTPATIENT)
Dept: NEUROLOGY | Facility: CLINIC | Age: 77
End: 2022-05-24
Payer: MEDICARE

## 2022-05-31 ENCOUNTER — OFFICE VISIT (OUTPATIENT)
Dept: NEUROLOGY | Facility: CLINIC | Age: 77
End: 2022-05-31
Attending: PSYCHIATRY & NEUROLOGY
Payer: MEDICARE

## 2022-05-31 DIAGNOSIS — M48.061 SPINAL STENOSIS OF LUMBAR REGION WITHOUT NEUROGENIC CLAUDICATION: ICD-10-CM

## 2022-05-31 PROCEDURE — 95886 MUSC TEST DONE W/N TEST COMP: CPT | Mod: RT | Performed by: PSYCHIATRY & NEUROLOGY

## 2022-05-31 PROCEDURE — 95910 NRV CNDJ TEST 7-8 STUDIES: CPT | Performed by: PSYCHIATRY & NEUROLOGY

## 2022-05-31 NOTE — LETTER
5/31/2022         RE: Leonardo De La Rosa  [address] [facility] [address]        Dear Colleague,    Thank you for referring your patient, Leonardo De La Rosa, to the [facility] [place]. Please see a copy of my visit note below.    See procedure note for EMG report      Again, thank you for allowing me to participate in the care of your patient.        Sincerely,        Augustin Terry MD

## 2022-05-31 NOTE — PROCEDURES
ELECTROMYOGRAPHY (EMG) REPORT       [facility] NEUROLOGY [place] [address] [place] [address]  Tel: [phone]  Fax: [phone]  [email or web address]     Leonardo De La Rosa,  1945, MRN [id number]  PCP: Cassie Ortega  Date: 2022     Principal Diagnosis: Spinal stenosis of lumbar region without neurogenic claudication     Height: 5 feet 4 inch  Reason for referral: Evaluate bilateral lowers. c/o balance difficulty > 2 weeks. h/o lumbar surgery, both knees replaced.      Motor NCS      Nerve / Sites Lat Amp Dist Issa    ms mV cm m/s   R Peroneal - EDB      Ankle 4.22 3.9 8       Fib head 10.68 2.8 27 42      Pop fossa 13.23 2.9 11 43   L Peroneal - EDB      Ankle 3.65 5.6 8       Fib head 10.73 4.9 28 40      Pop fossa 13.44 5.3 11 41   R Tibial - AH      Ankle 4.79 6.8 8       Pop fossa 14.01 5.7 38 41   L Tibial - AH      Ankle 4.74 10.3 8       Pop fossa 13.70 9.7 37 41       F  Wave      Nerve Fmin    ms   R Tibial - AH 57.71   L Tibial - AH 59.95       Sensory NCS      Nerve / Sites Onset Lat Pk Lat Amp.2-3 Dist Issa    ms ms  V cm m/s   R Sural - Ankle (Calf)      Calf 3.02 3.75 13.9 14 46   L Sural - Ankle (Calf)      Calf 3.28 4.17 8.1 14 43   R Superficial peroneal - Ankle      Lat leg 2.55 3.39 5.2 12 47   L Superficial peroneal - Ankle      Lat leg 2.92 3.33 13.9 12 41       EMG Summary Table     Spontaneous MUAP Rcmt Note   Muscle Fib PSW Fasc IA # Amp Dur PPP Rate Type   R. Gluteus medius None None None N N N N N N N   R. Gluteus jeffy None None None N N N N N N N   R. Biceps femoris (short head) None None None N N N N N N N   R. Adductor suyapa None None None N N N N N N N   R. Quadriceps None None None N N N N N N N   R. Tibialis anterior None None None N N N N N N N   R. Gastrocnemius (Medial head) None None None N N N N N N N   R. Tibialis posterior None None None N N N N N N N   L. Adductor suyapa None None None N N N N N N N   L. Quadriceps None None  None N N N N N N N   L. Tibialis anterior None None None N N N N N N N   L. Gastrocnemius (Medial head) None None None N N N N N N N   L. Tibialis posterior None None None N N N N N N N        Summary: Nerve conduction and EMG study of bilateral lower extremities shows:  Normal bilateral peroneal and tibial distal motor latencies, amplitudes and conduction velocities.  Normal bilateral tibial F latencies  And normal bilateral sural and superficial peroneal SNAP  Needle exam was normal    Impression:   This is a normal nerve conduction and EMG study of bilateral lower extremities      Augustin Terry MD  [facility]  (Formerly, [facility]      This note was dictated using voice recognition software.  Any grammatical or context distortions are unintentional and inherent to the software.

## 2022-06-13 ENCOUNTER — TRANSFERRED RECORDS (OUTPATIENT)
Dept: HEALTH INFORMATION MANAGEMENT | Facility: CLINIC | Age: 77
End: 2022-06-13

## 2022-07-14 NOTE — TELEPHONE ENCOUNTER
DIAGNOSIS: Neck pain / x-rays, MRI - [facility] or [facility] / Dr. Yves Zavala, [facility] [place]   APPOINTMENT DATE: 8.2.22   NOTES STATUS DETAILS   OFFICE NOTE from referring provider Media Tab 1.4.22 to 6.13.22 - Bang Joya MD - [facility]   OFFICE NOTE from other specialist Media Tab 8.2.21 - Cassie Ortega - [facility]   MEDICATION LIST Internal/Media Tab    EMG Media Tab 5.31.22 EMG - [facility]   LABS     CBC/DIFF Care Everywhere    DEXA PACS 3.1.22   INJECTIONS PACS 7.14.22, 3.14.22, 3.8.22, 2.17.22, 1.10.22,    MRI PACS 7.9.21 cervical spine, [facility]  12.31.21 cervical spine, [facility]  5.7.07 cervical spine, [facility]  8.4.18 cervical spine, [facility]   CT SCAN PACS/Nil Read 11.26.21 cervical spine  4.12.20 maxillofacial, [facility]  10.22.17 cervical spine   XRAYS (IMAGES & REPORTS) PACS 12.30.21 cervical spine, [facility]  6.23.21 cervical spine, [facility]     Action 7.14.22 2:11 PM NAVID   Action Taken Faxed request to [facility] for records [phone]. Faxed request to [facility] [phone] , [place] [phone]     Action 7.22.22 2:16 PM KH   Action Taken Records from [facility] received, sent to urgent scanning  MRI 7.9.21 cervical received and given to Jon Henriquez July 29, 2022 12:33 PM MT   Action Taken Images from [facility] and [facility] received and resolved to PACS.

## 2022-07-18 NOTE — TELEPHONE ENCOUNTER
Yevgeniy rx- Therapy changed to Aricept 10mg    Outpatient Medication Detail     Disp Refills Start End MARA   donepezil (ARICEPT) 10 MG tablet 90 tablet 3 3/16/2022  No   Sig - Route: Take 1 tablet (10 mg) by mouth At Bedtime - Oral   Sent to pharmacy as: Donepezil HCl 10 MG Oral Tablet (ARICEPT)   Class: E-Prescribe   Order: [id number]   E-Prescribing Status: Receipt confirmed by pharmacy (3/16/2022  1:42 PM CDT)

## 2022-08-02 NOTE — PROGRESS NOTES
In-Person Visit    REASON FOR CONSULTATION: Consult (Neck pain )     REFERRING PHYSICIAN: Referred Self  PCP:Cassie Ortega    History of Present Illness:  76 year old female, RHD, referred by Dr. Yves Zavala ([facility]) for neck pain.    Unaccompanied.  Chronic neck pain for about 1 yr, mainly right sided, from below the ear/jaw down the right side of neck.  Radiates to R side of head (temporal).  Getting worse over time.  Cannot sleep at night.  QOL poor.    Neck and head 100%%, Arm 0%,     Had been following with Dr. Bang Joya ([facility]) for this problem.  Sees Neurologist Dr. Terry ([facility]) for this same problem, who told her the pain is coming from her neck.    Previous treatment:   PT  Methocarbamol, NSAIDs, tylenol - helps a little.  [facility] - sees Dr. Lopez (?).  Suggested dilaudid, which patient takes occasionally.    Previous Injections:  1/10/22 - C1-2 facet injection (?)   2/17/22 - Right C1-2 facet injection ([facility], Dr. Cuba Cerda).  Per report, 100% pain relief.  Per patient, she doubts that it was 100%, but she cannot really remember.    3/8/22 - Right C2-3 facet injection (Dr. Suarez, [facility], [facility]).  Per report, no relief after injection.  3/14/22 - Right C1-2 facet injection (Dr. Suarez, [facility]).  7/14/22 - Right C1-2 facet injection with steroid (Dr. Suarez, [facility]).  No pain response mentioned in report.  Per patient, all these injections may help for about 10-30 minutes only.  She cannot really tell if they hit the right spot.  Per patient, she also had an injection procedure with [facility] in June/July 2022.    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      Oswestry (RAFIA) Questionnaire    OSWESTRY DISABILITY INDEX 7/28/2022   Count 3   Sum 7   Oswestry Score (%) 46.67   Some recent data might be hidden        Neck Disability  Index (NDI) Questionnaire    Neck Disability Index (NDI) 7/28/2022   Neck Disability Index: Count 9   NDI: Total Score = SUM (points for all 10 findings) 25   Neck Disability in Percent = (Total Score) / 50 * 100 55.56 (%)   Some recent data might be hidden        Visual Analog Pain Scale  Neck Pain Scale 0-10: 8  Right arm pain: 0  Left arm pain: 0    PROMIS-10 Scores  Global Mental Health Score: (P) 12  Global Physical Health Score: (P) 12  PROMIS TOTAL - SUBSCORES: (P) 24    ROS:  A 12-point review of systems was completed and is negative except for otherwise noted above in the history of present illness.    Med Hx:  Past Medical History:   Diagnosis Date     Arthritis     Feet Hands and back.     PONV (postoperative nausea and vomiting)     Severe     Postsurgical arthrodesis status 4/23/2013     Status post lumbar spinal fusion 2/24/2020       Surg Hx:  Past Surgical History:   Procedure Laterality Date     ARTHRODESIS FOOT  4/22/2013    Procedure: ARTHRODESIS FOOT;  Left Second and Third Tarsal metatarsal Arthrodesis, Bunion Correction, Lapidus Procedure, Weil Osteotomy   ;  Surgeon: Alber Pace MD;  Location: [facility] OR     ARTHROPLASTY KNEE Right 1/18/2022    Procedure: RIGHT TOTAL KNEE ARTHROPLASTY;  Surgeon: Yves Li MD;  Location: [facility] OR     BUNIONECTOMY LAPIDUS WITH TARSAL METATARSAL (TMT) FUSION  4/22/2013    Procedure: BUNIONECTOMY LAPIDUS WITH TARSAL METATARSAL (TMT) FUSION;;  Surgeon: Alber Pace MD;  Location: [facility] OR     FOOT SURGERY      right bunionectomy.     FOOT SURGERY       HYSTERECTOMY       HYSTERECTOMY TOTAL ABDOMINAL, BILATERAL SALPINGO-OOPHORECTOMY, COMBINED       KNEE SURGERY       ZZC TOTAL KNEE ARTHROPLASTY      left       Allergies:  Allergies   Allergen Reactions     Estratest H.S. Other (See Comments) and Unknown     Other reaction(s): Other (see comments)  Increase lipids  Outside source did not list reaction  Increase lipids       Hydrocodone       "Oxycodone      Propofol Nausea and Vomiting     Outside source states \"injectable and inhaled\"  Outside source states \"injectable and inhaled\"  Outside source states \"injectable and inhaled\"  Outside source states \"injectable and inhaled\"       Reglan      Estrogens Rash     Other reaction(s): Other (see comments)  Outside source did not list reaction  Estrogen patches, from patch only, ( Adhesive )  does tolerate estrogen other forms        Penicillins Nausea and Vomiting and Nausea     Other reaction(s): GI intolerance, GI Upset  Patient states upset stomach.  Patient states upset stomach.  Patient states upset stomach.  Patient states upset stomach.         Meds:  Current Outpatient Medications   Medication     acetaminophen (TYLENOL) 325 MG tablet     ammonium lactate (AMLACTIN) 12 % external cream     aspirin (ASA) 325 MG EC tablet     azelastine (ASTELIN) 0.1 % nasal spray     calcium carbonate-vitamin D 600-200 MG-UNIT TABS     denosumab (PROLIA) 60 MG/ML SOSY injection     donepezil (ARICEPT) 10 MG tablet     donepezil (ARICEPT) 5 MG tablet     DULoxetine (CYMBALTA) 60 MG capsule     famotidine (PEPCID) 40 MG tablet     ferrous sulfate (FEROSUL) 325 (65 Fe) MG tablet     fluocin-hydroquinone-tretinoin (TRI-RODNEY) 0.01-4-0.05 % CREA     fluticasone (FLONASE) 50 MCG/ACT nasal spray     folic acid (FOLVITE) 1 MG tablet     glucosamine-chondroitin 500-400 MG CAPS     HYDROmorphone (DILAUDID) 2 MG tablet     levocetirizine (XYZAL) 5 MG tablet     losartan (COZAAR) 100 MG tablet     methocarbamol (ROBAXIN) 500 MG tablet     multivitamin w/minerals (THERA-VIT-M) tablet     Omega-3 Fatty Acids (OMEGA-3 FISH OIL) 1200 MG CAPS     omeprazole (PRILOSEC) 20 MG DR capsule     pravastatin (PRAVACHOL) 20 MG tablet     pregabalin (LYRICA) 75 MG capsule     senna-docusate (SENOKOT-S/PERICOLACE) 8.6-50 MG tablet     Vitamin D3 (CHOLECALCIFEROL) 25 mcg (1000 units) tablet     zolpidem (AMBIEN) 5 MG tablet     No current " facility-administered medications for this visit.       Fam Hx:  Family History   Problem Relation Age of Onset     Coronary Artery Disease Mother        P/S Hx:  Social History     Tobacco Use     Smoking status: Never Smoker     Smokeless tobacco: Never Used   Substance Use Topics     Alcohol use: No         Physical Exam:  Very pleasant, healthy appearing, alert, oriented x 3, cooperative.  Normal mood and affect.  Not in cardiorespiratory distress.  There were no vitals taken for this visit.  Normal upright posture.    Normal gait without assistive device.  No antalgia / imbalance.    No gross spinal deformity, no skin lesions or surgical scars.  Localizes pain at mainly right sided neck pain, from below the ear/jaw down the right side of neck.  Radiates to R side of head (temporal).  Tenderness: (-) midline, (-) paraspinal, (-) R and L PSIS.  ROM: good neck ROM    Neuro Exam:  Motor: intact for all muscle groups bilateral UE's 5/5.  Sensory:  Intact to light touch all dermatomes bilateral UE's.       Imaging:    Cervical MRI from 12/31/2021 reviewed.  Show multilevel cervical spondylosis, most advanced at C6-7, with disc collapse and osteophyte formation.  No significant spinal cord compression or deformation noted.  No cord signal change or myelomalacia.    Cervical AP lateral standing x-rays taken today reviewed.  Shows spondylolisthesis C4-5, and advanced disc collapse (spondylosis) C6-7.    Assessment:    1.  Chronic right-sided upper neck pain likely 2' to Right-sided C1-2 facet arthritis (spondylosis), with variable responses to multiple previous right C1-2 facet injections.    Plan:    Had good long discussion with patient.  Her biggest question is what is causing her pain.  I explained that as pain is an invisible entity, we are almost never 100% certain where one's pain is actuallly coming from.  Even various imaging studies cannot really show us pain directly.  That said, her imaging studies show  advanced spondhylosis arthritis at C1-2 on the right side, which is the same side where her pain is.  Her pain is also higher up in her neck (at the junction between head and neck), which anatomically is very close to the C1-2 area.  Thus, it is very likely that her pain is indeed coming from this joint.  She has had multiple previous Right C1-2 facet injections.  Per patient, she's never really had a 'garrick' moment, even though some of the reports mentioned 100% relief shortly after injection.  It is of course possible that she may just not be remembering very well especially the immediate pain response, or that the injections did not make it exactly into the joint (this is a very difficult area to localize with a needle).    All that said, my foremost suspicion as to the cause of her pain is right C1-2 joint.  We discussed the degenerative and thus non life threatening nature of this condition, and treatment options, ranging from observation / living with pain, to continued nonop treatment (PT, repeat injections) to possible surgery in form of C1-2 fusion.    Patient at this point is not ready to consider surgery.  She would continue working with her pain clinic ([facility]), sports/med spine provider (Dr. Joya) and try to manage nonop.  RTC prn if would like to further discuss/consider surgery in future.    45 minutes spent on the date of the encounter doing chart review/review of outside records/review of test results/interpretation of tests/patient visit/documentation/discussion with other provider(s)/discussion with patient and family.      Cirilo Subramanian MD    Orthopaedic Spine Surgery  Dept Orthopaedic Surgery, [facility]  [phone] office, [phone] pager  [email or web address]

## 2022-08-02 NOTE — LETTER
8/2/2022         RE: Leonardo De La Rosa  [address] [facility] [address]        Dear Colleague,    Thank you for referring your patient, Leonardo De La Rosa, to the [facility] [place]. Please see a copy of my visit note below.    In-Person Visit    REASON FOR CONSULTATION: Consult (Neck pain )     REFERRING PHYSICIAN: Referred Self  PCP:Cassie Ortega    History of Present Illness:  76 year old female, RHD, referred by Dr. Yves Zavala ([facility]) for neck pain.    Unaccompanied.  Chronic neck pain for about 1 yr, mainly right sided, from below the ear/jaw down the right side of neck.  Radiates to R side of head (temporal).  Getting worse over time.  Cannot sleep at night.  QOL poor.    Neck and head 100%%, Arm 0%,     Had been following with Dr. Bang Joya ([facility]) for this problem.  Sees Neurologist Dr. Terry ([facility]) for this same problem, who told her the pain is coming from her neck.    Previous treatment:   PT  Methocarbamol, NSAIDs, tylenol - helps a little.  [facility] - sees Dr. Lopez (?).  Suggested dilaudid, which patient takes occasionally.    Previous Injections:  1/10/22 - C1-2 facet injection (?)   2/17/22 - Right C1-2 facet injection ([facility], Dr. Cuba Cerda).  Per report, 100% pain relief.  Per patient, she doubts that it was 100%, but she cannot really remember.    3/8/22 - Right C2-3 facet injection (Dr. Suarez, [facility]).  Per report, no relief after injection.  3/14/22 - Right C1-2 facet injection (Dr. Suarez, [facility]).  7/14/22 - Right C1-2 facet injection with steroid (Dr. Suarez, [facility]).  No pain response mentioned in report.  Per patient, all these injections may help for about 10-30 minutes only.  She cannot really tell if they hit the right spot.  Per patient, she also had an injection procedure with [facility] in June/July  2022.    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      Oswestry (RAFIA) Questionnaire    OSWESTRY DISABILITY INDEX 7/28/2022   Count 3   Sum 7   Oswestry Score (%) 46.67   Some recent data might be hidden        Neck Disability Index (NDI) Questionnaire    Neck Disability Index (NDI) 7/28/2022   Neck Disability Index: Count 9   NDI: Total Score = SUM (points for all 10 findings) 25   Neck Disability in Percent = (Total Score) / 50 * 100 55.56 (%)   Some recent data might be hidden        Visual Analog Pain Scale  Neck Pain Scale 0-10: 8  Right arm pain: 0  Left arm pain: 0    PROMIS-10 Scores  Global Mental Health Score: (P) 12  Global Physical Health Score: (P) 12  PROMIS TOTAL - SUBSCORES: (P) 24    ROS:  A 12-point review of systems was completed and is negative except for otherwise noted above in the history of present illness.    Med Hx:  Past Medical History:   Diagnosis Date     Arthritis     Feet Hands and back.     PONV (postoperative nausea and vomiting)     Severe     Postsurgical arthrodesis status 4/23/2013     Status post lumbar spinal fusion 2/24/2020       Surg Hx:  Past Surgical History:   Procedure Laterality Date     ARTHRODESIS FOOT  4/22/2013    Procedure: ARTHRODESIS FOOT;  Left Second and Third Tarsal metatarsal Arthrodesis, Bunion Correction, Lapidus Procedure, Weil Osteotomy   ;  Surgeon: Alber Pace MD;  Location: [facility] OR     ARTHROPLASTY KNEE Right 1/18/2022    Procedure: RIGHT TOTAL KNEE ARTHROPLASTY;  Surgeon: Yves Li MD;  Location: [facility] OR     BUNIONECTOMY LAPIDUS WITH TARSAL METATARSAL (TMT) FUSION  4/22/2013    Procedure: BUNIONECTOMY LAPIDUS WITH TARSAL METATARSAL (TMT) FUSION;;  Surgeon: Alber Pace MD;  Location: [facility] OR     FOOT SURGERY      right bunionectomy.     FOOT SURGERY       HYSTERECTOMY       HYSTERECTOMY TOTAL ABDOMINAL, BILATERAL SALPINGO-OOPHORECTOMY, COMBINED       KNEE SURGERY       [facility]  "TOTAL KNEE ARTHROPLASTY      left       Allergies:  Allergies   Allergen Reactions     Estratest H.S. Other (See Comments) and Unknown     Other reaction(s): Other (see comments)  Increase lipids  Outside source did not list reaction  Increase lipids       Hydrocodone      Oxycodone      Propofol Nausea and Vomiting     Outside source states \"injectable and inhaled\"  Outside source states \"injectable and inhaled\"  Outside source states \"injectable and inhaled\"  Outside source states \"injectable and inhaled\"       Reglan      Estrogens Rash     Other reaction(s): Other (see comments)  Outside source did not list reaction  Estrogen patches, from patch only, ( Adhesive )  does tolerate estrogen other forms        Penicillins Nausea and Vomiting and Nausea     Other reaction(s): GI intolerance, GI Upset  Patient states upset stomach.  Patient states upset stomach.  Patient states upset stomach.  Patient states upset stomach.         Meds:  Current Outpatient Medications   Medication     acetaminophen (TYLENOL) 325 MG tablet     ammonium lactate (AMLACTIN) 12 % external cream     aspirin (ASA) 325 MG EC tablet     azelastine (ASTELIN) 0.1 % nasal spray     calcium carbonate-vitamin D 600-200 MG-UNIT TABS     denosumab (PROLIA) 60 MG/ML SOSY injection     donepezil (ARICEPT) 10 MG tablet     donepezil (ARICEPT) 5 MG tablet     DULoxetine (CYMBALTA) 60 MG capsule     famotidine (PEPCID) 40 MG tablet     ferrous sulfate (FEROSUL) 325 (65 Fe) MG tablet     fluocin-hydroquinone-tretinoin (TRI-RODNEY) 0.01-4-0.05 % CREA     fluticasone (FLONASE) 50 MCG/ACT nasal spray     folic acid (FOLVITE) 1 MG tablet     glucosamine-chondroitin 500-400 MG CAPS     HYDROmorphone (DILAUDID) 2 MG tablet     levocetirizine (XYZAL) 5 MG tablet     losartan (COZAAR) 100 MG tablet     methocarbamol (ROBAXIN) 500 MG tablet     multivitamin w/minerals (THERA-VIT-M) tablet     Omega-3 Fatty Acids (OMEGA-3 FISH OIL) 1200 MG CAPS     omeprazole " (PRILOSEC) 20 MG DR capsule     pravastatin (PRAVACHOL) 20 MG tablet     pregabalin (LYRICA) 75 MG capsule     senna-docusate (SENOKOT-S/PERICOLACE) 8.6-50 MG tablet     Vitamin D3 (CHOLECALCIFEROL) 25 mcg (1000 units) tablet     zolpidem (AMBIEN) 5 MG tablet     No current facility-administered medications for this visit.       Fam Hx:  Family History   Problem Relation Age of Onset     Coronary Artery Disease Mother        P/S Hx:  Social History     Tobacco Use     Smoking status: Never Smoker     Smokeless tobacco: Never Used   Substance Use Topics     Alcohol use: No         Physical Exam:  Very pleasant, healthy appearing, alert, oriented x 3, cooperative.  Normal mood and affect.  Not in cardiorespiratory distress.  There were no vitals taken for this visit.  Normal upright posture.    Normal gait without assistive device.  No antalgia / imbalance.    No gross spinal deformity, no skin lesions or surgical scars.  Localizes pain at mainly right sided neck pain, from below the ear/jaw down the right side of neck.  Radiates to R side of head (temporal).  Tenderness: (-) midline, (-) paraspinal, (-) R and L PSIS.  ROM: good neck ROM    Neuro Exam:  Motor: intact for all muscle groups bilateral UE's 5/5.  Sensory:  Intact to light touch all dermatomes bilateral UE's.       Imaging:    Cervical MRI from 12/31/2021 reviewed.  Show multilevel cervical spondylosis, most advanced at C6-7, with disc collapse and osteophyte formation.  No significant spinal cord compression or deformation noted.  No cord signal change or myelomalacia.    Cervical AP lateral standing x-rays taken today reviewed.  Shows spondylolisthesis C4-5, and advanced disc collapse (spondylosis) C6-7.    Assessment:    1.  Chronic right-sided upper neck pain likely 2' to Right-sided C1-2 facet arthritis (spondylosis), with variable responses to multiple previous right C1-2 facet injections.    Plan:    Had good long discussion with patient.  Her  biggest question is what is causing her pain.  I explained that as pain is an invisible entity, we are almost never 100% certain where one's pain is actuallly coming from.  Even various imaging studies cannot really show us pain directly.  That said, her imaging studies show advanced spondhylosis arthritis at C1-2 on the right side, which is the same side where her pain is.  Her pain is also higher up in her neck (at the junction between head and neck), which anatomically is very close to the C1-2 area.  Thus, it is very likely that her pain is indeed coming from this joint.  She has had multiple previous Right C1-2 facet injections.  Per patient, she's never really had a 'garrick' moment, even though some of the reports mentioned 100% relief shortly after injection.  It is of course possible that she may just not be remembering very well especially the immediate pain response, or that the injections did not make it exactly into the joint (this is a very difficult area to localize with a needle).    All that said, my foremost suspicion as to the cause of her pain is right C1-2 joint.  We discussed the degenerative and thus non life threatening nature of this condition, and treatment options, ranging from observation / living with pain, to continued nonop treatment (PT, repeat injections) to possible surgery in form of C1-2 fusion.    Patient at this point is not ready to consider surgery.  She would continue working with her pain clinic ([facility]), sports/med spine provider (Dr. Joya) and try to manage nonop.  RTC prn if would like to further discuss/consider surgery in future.    45 minutes spent on the date of the encounter doing chart review/review of outside records/review of test results/interpretation of tests/patient visit/documentation/discussion with other provider(s)/discussion with patient and family.      Cirilo Subramanian MD    Orthopaedic Spine Surgery  Dept Orthopaedic Surgery,  [facility]  [phone] office, [phone] pager  [email or web address]

## 2022-09-16 NOTE — LETTER
2022         RE: Leonardo De La Rosa  [address] [facility] [address]        Dear Colleague,    Thank you for referring your patient, Leonardo De La Rosa, to the [facility] [place]. Please see a copy of my visit note below.    NEUROLOGY FOLLOW UP VISIT  NOTE       [facility] [place]  [address] [place] [address]  Tel: [phone]  Fax: [phone]  [email or web address]     Leonardo De La Rosa,  1945, MRN [id number]  PCP: Cassie Ortega  Date: 2022      ASSESSMENT & PLAN     Visit Diagnosis  1. Late onset Alzheimer's dementia without behavioral disturbance (H)     Late onset Alzheimer's dementia without behavioral disturbances  76-year-old female with SNHL, osteoporosis, GERD who is been followed in the clinic for late onset Alzheimer's dementia.  She had extensive work-up including MRI brain MRA, neuropsychological testing that initially was normal but a subsequent neuropsychological testing did suggest mild unspecified neurocognitive disorder.  Brain PET scan was abnormal showing hypometabolism in the parietal lobe right greater than left and lesser loss in the frontal region.  This suggest the diagnosis of Alzheimer's dementia.  She was started on Aricept and currently takes 10 mg daily.  She is mourning the loss of her  who suddenly passed away and it understandably is extremely hard for her.  I have refilled her prescriptions and I am checking a hepatic panel.  Regular follow-up will be in 1 year    Thank you again for this referral, please feel free to contact me if you have any questions.    Augustin Terry MD  [facility]  (Formerly, [facility]     HISTORY OF PRESENT ILLNESS     Patient is a 76-year-old female with history of SNHL, osteoporosis, GERD who is been followed in our clinic for late onset Alzheimer's dementia, intractable tension type headache and  frequent falls.  She is under a lot of stress as her  who was also my patient suddenly passed away.  She was last seen virtually on 5/12/2022 when she reported gait instability and I had recommended MRI of the lumbar spine and EMG.  Her EMG was normal and MRI of the lumbar spine did not show any abnormality.  Also lab work was normal.  I was concerned about her frequent falls and I told her family members that she should not live independently but previously her  was resistant to the idea of moving to an assisted living facility.Since her last visit she was seen at [facility] and had C1-2 intra-articular facet joint injection.  She is quite overwhelmed due to the recent passing away of her  and wants to know what happened.  She has support available and her 2 daughters do keep an eye on her.  She feels her memory is worse but attributes that to recent passing of her     She is also being followed in our clinic for progressive cognitive decline and had extensive work-up twice for cognitive issues that included MRI brain, MRA, neuropsychological testing and lab work that were normal.  As she was under a lot of stress due to her  medical condition initially the diagnosis of severe dementia was made.  However due to persistent symptoms neuropsychological testing was done in person that did suggest mild unspecified neurocognitive disorder with adjustment disorder and anxiety.  PET scan showed hypometabolism in the parietal lobe right greater than left and a lesser loss in frontal region.  Such pattern can be seen in early Alzheimer's dementia and she was therefore started on Aricept but has not noticed any change.     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    Surgical History:  She  has a past surgical history that includes TOTAL KNEE ARTHROPLASTY; Foot surgery; Hysterectomy total abdominal, bilateral salpingo-oophorectomy, combined; Arthrodesis foot (4/22/2013); Bunionectomy lapidus with tarsal metatarsal (TMT) fusion (4/22/2013); Hysterectomy; knee surgery; Foot surgery; and Arthroplasty knee (Right, 1/18/2022).     SOCIAL HISTORY     Reviewed, and she  reports that she has never smoked. She has never used smokeless tobacco. She reports that she does not drink alcohol and does not use drugs.     FAMILY HISTORY     Reviewed, and family history includes Coronary Artery Disease in her mother.     ALLERGIES     Allergies   Allergen Reactions     Estratest H.S. Other (See Comments) and Unknown     Other reaction(s): Other (see comments)  Increase lipids  Outside source did not list reaction  Increase lipids       Hydrocodone      Oxycodone      Propofol Nausea and Vomiting     Outside source states \"injectable and inhaled\"  Outside source states \"injectable and inhaled\"  Outside source states \"injectable and inhaled\"  Outside source states \"injectable and inhaled\"       Reglan      Estrogens Rash     Other reaction(s): Other (see comments)  Outside source did not list reaction  Estrogen patches, from patch only, ( Adhesive )  does tolerate estrogen other forms        Penicillins Nausea and Vomiting and Nausea     Other reaction(s): GI intolerance, GI Upset  Patient states upset stomach.  Patient states upset stomach.  Patient states upset " stomach.  Patient states upset stomach.           REVIEW OF SYSTEMS     A 12 point review of system was performed and was negative except as outlined in the history of present illness.     HOME MEDICATIONS     Current Outpatient Rx   Medication Sig Dispense Refill     acetaminophen (TYLENOL) 325 MG tablet Take 2 tablets (650 mg) by mouth every 4 hours as needed for other (mild pain) 100 tablet 0     ammonium lactate (AMLACTIN) 12 % external cream Apply topically daily as needed for dry skin       azelastine (ASTELIN) 0.1 % nasal spray Spray 1 spray into both nostrils every morning       calcium carbonate-vitamin D 600-200 MG-UNIT TABS Take 1 tablet by mouth daily.       donepezil (ARICEPT) 10 MG tablet Take 1 tablet (10 mg) by mouth At Bedtime 90 tablet 3     DULoxetine (CYMBALTA) 60 MG capsule Take 60 mg by mouth daily        famotidine (PEPCID) 40 MG tablet Take 20 mg by mouth every evening        ferrous sulfate (FEROSUL) 325 (65 Fe) MG tablet Take 325 mg by mouth every morning       fluocin-hydroquinone-tretinoin (TRI-RODNEY) 0.01-4-0.05 % CREA Externally apply topically At Bedtime 30 g 3     fluticasone (FLONASE) 50 MCG/ACT nasal spray INSTILL 1 SPRAY INTO EACH NOSTRIL ONCE DAILY AS NEEDED FOR RHINITIS       folic acid (FOLVITE) 1 MG tablet Take 1 tablet (1 mg) by mouth daily 90 tablet 3     glucosamine-chondroitin 500-400 MG CAPS Take 1 capsule by mouth daily.       levocetirizine (XYZAL) 5 MG tablet Take 5 mg by mouth every morning       losartan (COZAAR) 100 MG tablet Take 100 mg by mouth daily        methocarbamol (ROBAXIN) 500 MG tablet TAKE 1 TABLET BY MOUTH 3 TIMES A DAY       multivitamin w/minerals (THERA-VIT-M) tablet Take 1 tablet by mouth daily.       Omega-3 Fatty Acids (OMEGA-3 FISH OIL) 1200 MG CAPS Take 1,200 g by mouth every 7 days on Friday       omeprazole (PRILOSEC) 20 MG DR capsule Take 20 mg by mouth every morning        pravastatin (PRAVACHOL) 20 MG tablet Take 20 mg by mouth daily         "pregabalin (LYRICA) 75 MG capsule Take 75 mg by mouth 2 times daily Patient taking 50 mg in am and 75 mg in pm       senna-docusate (SENOKOT-S/PERICOLACE) 8.6-50 MG tablet Take 1-2 tablets by mouth 2 times daily Take while on oral narcotics to prevent or treat constipation. 30 tablet 0     Vitamin D3 (CHOLECALCIFEROL) 25 mcg (1000 units) tablet Take 1,000 Units by mouth daily        zolpidem (AMBIEN) 5 MG tablet Take 5 mg by mouth At Bedtime            PHYSICAL EXAM     Vital signs  Ht 1.626 m (5' 4\")   Wt 63.5 kg (140 lb)   BMI 24.03 kg/m      Weight:   140 lbs 0 oz    Patient is alert and oriented x3 speech was normal with no dysarthria or aphasia.  Extraocular movements are intact face symmetrical.  She moves all 4 extremities.  Rest of the exam was not possible on a video visit     PERTINENT DIAGNOSTIC STUDIES     Following studies were reviewed:     CT CERVICAL SPINE 11/26/2021  1.  Variable degrees of mild degenerative change throughout the cervical spine. No evidence of significant spinal canal stenosis. Variable degrees of mild foraminal narrowing due to uncovertebral joint and facet hypertrophic change at the above-mentioned   levels. Please see body of report for details as above. No acute findings.     CT BRAIN 12/17/2020  1.  No acute intracranial process.  2.  Minimal chronic small vessel ischemic disease.     CT LUMBAR SPINE 9/23/2020  1. New but subacute to chronic appearing fractures of L2, including mild  superior endplate compression and incompletely healed bilateral pedicle  fractures, which on the left extend into the posterosuperior vertebral body.  2. Postoperative changes including anterior and posterior instrumented fusion at  L5-S1. No hardware failure. No advanced spinal canal or neural foraminal  narrowing at any level.     MRI LUMBAR SPINE 5/23/2022  1.  Postoperative change of L5 through sacral fusion and laminectomy.  2.  Multilevel degenerative change.  3.  No high-grade " stenoses.    MRI BRAIN 9/13/2021  1. No evidence of acute intracranial hemorrhage, mass effect, or infarction.  2. Mild nonspecific white matter changes.  3. Mild brain parenchymal volume loss.     MRI CERVICAL SPINE 7/12/2021  1.  Mild central canal and left foraminal narrowing at C6-7  2.  Mild degenerative changes within the remainder of the cervical spine as above     MRI, MRA BRAIN 2/26/2021  HEAD MRI:   1.  Mild chronic small vessel ischemic changes and age-related changes.  2.  Otherwise normal brain MRI.    HEAD MRA:   1.  Normal MRA [place].    NECK MRA:  1.  Minimal plaque in the right carotid bulb, but no significant associated stenosis by NASCET criteria.  2.   Otherwise normal neck MRA.     MRI LUMBAR SPINE 9/23/2020  No new spondylolisthesis since 05/04/2020. Interval subacute  appearing superior L2 compression fracture and bilateral pedicle fractures.  Stable chronic healing fracture through the posterolateral left L3 vertebral  body and pedicle.     BRAIN PET 11/12/2021  FDG metabolic pattern of the brain demonstrates  hypometabolism in the parietal lobes (right greater then left) and  lesser loss in frontal, this pattern can be seen with early  Alzheimer's disease.     EMG 5/31/2022  This is a normal nerve conduction and EMG study of bilateral lower extremities    EMG 7/13/2021  1.  This is a normal electrodiagnostic study of bilateral lower extremities  2.  Specifically there is no electrodiagnostic evidence of a lumbar radiculopathy, plexopathy or peripheral apathy     NEUROPSYCHOLOGICAL EVALUATION 10/18/2021  DIAGNOSIS:  Unspecified Mild Neurocognitive Disorder     Adjustment Disorder with Anxiety      RECOMMENDATIONS:  1) Ongoing neurologic care and monitoring is recommended.      2) If not medically contraindicated, Ms. De La Rosa may benefit from an empirical trial of a cognitive-enhancing medication.      3) Consider a PET scan to help clarify etiology of Ms. De La Rosa's cognitive  decline.     4) Consider a referral to Medication Therapy Management (pharmacist) for a review of Ms. De La Rosa's current regimen and to provide recommendations in order to minimize any unwanted side effects.     5) Additional caregiving support for Ms. De La Rosa is crucial. There are several cultural factors at play, but I would highly encourage Ms. De La Rosa to consider formal in-home care giving services to assist her so that she can have some respite. Some other ideas for increased support include hiring a caregiver to come at night so that she can sleep, hiring a cook who is familiar at preparing  cuisine, or even having regularly scheduled times of respite when her daughters can attend to their dad so she can engage in her own self-care activities (e.g., every Saturday and Sunday from 12-5 PM).     6) Although the patient has declined this in the past, I would encourage her to consider establishing care with a psychotherapist for additional support. If she prefers a provider who is more familiar with her culture, there are a couple of options that I found in the community:    Jenny Mak of [facility] in [place] ([phone])    Fran Gutierrez of [employer] in [facility] ([phone])     7) Given her mild memory issues, occasional oversight is encouraged with more complex and/or novel tasks to ensure accuracy and safety (e.g., medication and financial management). She should allow herself extra time to complete these types of tasks as well.     8) The patient is encouraged to utilize cognitive compensatory strategies in daily life, including utilizing note pads, checklists, to-do lists, a calendar/planner, labeled alarm reminders, a GPS, a pillbox, and maintaining a daily morning and nighttime routine and an organized living/work environment.     9) Ms. De La Rosa is strongly encouraged to adhere closely to her medication regimen to help keep her cerebrovascular  risk factors (e.g., hyperlipidemia) and other medical conditions well controlled. This may help prevent further exacerbations in cognitive decline in the future.     10) Neuropsychological follow-up is recommended in 12-18 months (or as clinically indicated) in order to monitor her cognitive status, help to clarify etiology, and update recommendations. The current test data can be used as a baseline to which future comparisons can be made.     NEUROPSYCHOLOGICAL EVALUATION 3/25/2021  No Cognitive Disorder     Adjustment Disorder with Anxiety     RECOMMENDATIONS:  1) Stress management strategies are strongly encouraged, as improvement in emotional distress will lead to perceived improvements in her cognitive functioning (and even her physical pain level to some degree) over time. Such strategies may include:  ? Participating in individual psychotherapy. If the patient is amenable, I will refer her to our psychologist (Dr. Flavia Rivera). I will have our schedulers call her to set up an appointment, or she can always call our clinic at a later time to establish care ([phone]).  ? Consider an adjustment to her medications for her mood/anxiety. She experienced side effects with increased dosing of Cymbalta; as such, consider an alternative medication for anxiety (e.g., a selective serotonin reuptake inhibitor). This may help with her daytime anxiety as well as the heightened anxiety she experiences at night (she fears her sleep medications will make her too sedated to respond to her 's needs, but her anxiety about this keeps her from sleeping and increases her hypervigilance at night as well). This recommendation will be deferred to her PCP.  ? Utilizing relaxation practices. I will mail her a handout with some techniques she may try on her own.  ? Attending caregiving support groups. She may find these through the Alzheimer's Association website or hotline (https://www.alz.org/ or by calling 524-344-5865 ) or  through the [employer] Linkage Line ([place]://[place].gov[place]senior[place]linkage-line/ or by calling 984-263-7774).     2) In addition, a referral to a  or  or calling the [employer] Linkage Line may be helpful to assist her in seeking out respite services or formal in-home care giving services to allow Ms. De La Rosa to take a break or re-engage in her hobbies and interests.     3) Ms. De La Rosa is also encouraged to follow up with her medical providers for ongoing pain management. Improvements in her pain may help her stress level and cognitive functioning to some degree.     4) Ms. De La Rosa should adhere closely to her medication regimen in order to manage her cerebrovascular risk factors (hyperlipidemia), pain, and mood. This may help to prevent future cognitive decline.     5) The patient is encouraged to remain physically, socially, and mentally active for optimal brain health. This may also help to improve her emotional wellbeing.     6) From a neurocognitive standpoint, I have no concerns about the patient's ability to independently perform IADLs or care for her .      7) Cognitive strategies may be helpful for her own reassurance. These may include utilizing note pads, checklists, to-do lists, alarm reminders, a detailed calendar/planner, a GPS, a pillbox, and maintaining a daily morning and nighttime routine and an organized living environment. She should avoid multi-tasking when possible and should attempt to reduce as much distraction as possible while performing complex or important tasks.     8) Neuropsychological follow-up is not warranted at this time. However, the current test data can now serve as a baseline should a repeat assessment be indicated in the future     PERTINENT LABS  Following labs were reviewed:  No visits with results within 3 Month(s) from this visit.   Latest known visit with results is:   Admission on 01/18/2022, Discharged on 01/19/2022   Component Date Value     Hemoglobin  01/19/2022 8.9 (A)     Creatinine 01/19/2022 0.52      GFR Estimate 01/19/2022 >90      Glucose 01/19/2022 89      Patient Fasting > 8hrs? 01/19/2022 No       VIDEO VISIT DETAILS  Patient is being evaluated via a billable video visit.   Patient would like the video invitation sent by: [x]E-mail  []SMS   Type of service: Video Visit  Video Start Time: 10:53 AM  Video End Time (time video stopped): 11:01 AM  Originating Location (Patient Location): Patient's Home  Distant Location (provider location): [facility] [place]   Mode of Communication: Video Conference via [x][place] []Doximity     Total time spent for face to face visit, reviewing labs/imaging studies, counseling and coordination of care was: 30 Minutes spent on the date of the encounter doing chart review, review of outside records, review of test results, interpretation of tests, patient visit and documentation       This note was dictated using voice recognition software.  Any grammatical or context distortions are unintentional and inherent to the software.    Orders Placed This Encounter   Procedures     Hepatic function panel      New Prescriptions    No medications on file     Modified Medications    Modified Medication Previous Medication    DONEPEZIL (ARICEPT) 10 MG TABLET donepezil (ARICEPT) 10 MG tablet       Take 1 tablet (10 mg) by mouth At Bedtime    Take 1 tablet (10 mg) by mouth At Bedtime                     Again, thank you for allowing me to participate in the care of your patient.        Sincerely,        Augustin Terry MD

## 2022-09-16 NOTE — NURSING NOTE
Chief Complaint   Patient presents with     Gait instability     MRI, EMG and lab results      Late onset Alzheimer's dementia without behavioral disturba     Doing well on Aricept 10mg     Video Visit     Jameson Martinez CMA on 9/16/2022 at 10:52 AM

## 2022-09-16 NOTE — PROGRESS NOTES
NEUROLOGY FOLLOW UP VISIT  NOTE       [facility] NEUROLOGY [place]  [address] [place] [address]  Tel: [phone]  Fax: [phone]  [email or web address]     Leonardo De La Rosa,  1945, MRN [id number]  PCP: Cassie Ortega  Date: 2022      ASSESSMENT & PLAN     Visit Diagnosis  Late onset Alzheimer's dementia without behavioral disturbance (H)     Late onset Alzheimer's dementia without behavioral disturbances  76-year-old female with SNHL, osteoporosis, GERD who is been followed in the clinic for late onset Alzheimer's dementia.  She had extensive work-up including MRI brain MRA, neuropsychological testing that initially was normal but a subsequent neuropsychological testing did suggest mild unspecified neurocognitive disorder.  Brain PET scan was abnormal showing hypometabolism in the parietal lobe right greater than left and lesser loss in the frontal region.  This suggest the diagnosis of Alzheimer's dementia.  She was started on Aricept and currently takes 10 mg daily.  She is mourning the loss of her  who suddenly passed away and it understandably is extremely hard for her.  I have refilled her prescriptions and I am checking a hepatic panel.  Regular follow-up will be in 1 year    Thank you again for this referral, please feel free to contact me if you have any questions.    Augustin Terry MD  [facility]  (Formerly, [facility]     HISTORY OF PRESENT ILLNESS     Patient is a 76-year-old female with history of SNHL, osteoporosis, GERD who is been followed in our clinic for late onset Alzheimer's dementia, intractable tension type headache and frequent falls.  She is under a lot of stress as her  who was also my patient suddenly passed away.  She was last seen virtually on 2022 when she reported gait instability and I had recommended MRI of the lumbar spine and EMG.  Her EMG was normal and  MRI of the lumbar spine did not show any abnormality.  Also lab work was normal.  I was concerned about her frequent falls and I told her family members that she should not live independently but previously her  was resistant to the idea of moving to an assisted living facility.Since her last visit she was seen at [facility] and had C1-2 intra-articular facet joint injection.  She is quite overwhelmed due to the recent passing away of her  and wants to know what happened.  She has support available and her 2 daughters do keep an eye on her.  She feels her memory is worse but attributes that to recent passing of her     She is also being followed in our clinic for progressive cognitive decline and had extensive work-up twice for cognitive issues that included MRI brain, MRA, neuropsychological testing and lab work that were normal.  As she was under a lot of stress due to her  medical condition initially the diagnosis of severe dementia was made.  However due to persistent symptoms neuropsychological testing was done in person that did suggest mild unspecified neurocognitive disorder with adjustment disorder and anxiety.  PET scan showed hypometabolism in the parietal lobe right greater than left and a lesser loss in frontal region.  Such pattern can be seen in early Alzheimer's dementia and she was therefore started on Aricept but has not noticed any change.     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    Surgical History:  She  has a past surgical history that includes TOTAL KNEE ARTHROPLASTY; Foot surgery; Hysterectomy total abdominal, bilateral salpingo-oophorectomy, combined; Arthrodesis foot (4/22/2013); Bunionectomy lapidus with tarsal metatarsal (TMT) fusion (4/22/2013); Hysterectomy; knee surgery; Foot surgery; and Arthroplasty knee (Right, 1/18/2022).     SOCIAL HISTORY     Reviewed, and she  reports that she has never smoked. She has never used smokeless tobacco. She reports that she does not drink alcohol and does not use drugs.     FAMILY HISTORY     Reviewed, and family history includes Coronary Artery Disease in her mother.     ALLERGIES     Allergies   Allergen Reactions     Estratest H.S. Other (See Comments) and Unknown     Other reaction(s): Other (see comments)  Increase lipids  Outside source did not list reaction  Increase lipids       Hydrocodone      Oxycodone      Propofol Nausea and Vomiting     Outside source states \"injectable and inhaled\"  Outside source states \"injectable and inhaled\"  Outside source states \"injectable and inhaled\"  Outside source states \"injectable and inhaled\"       Reglan      Estrogens Rash     Other reaction(s): Other (see comments)  Outside source did not list reaction  Estrogen patches, from patch only, ( Adhesive )  does tolerate estrogen other forms        Penicillins Nausea and Vomiting and Nausea     Other reaction(s): GI intolerance, GI Upset  Patient states upset stomach.  Patient states upset stomach.  Patient states upset stomach.  Patient states upset stomach.           REVIEW OF SYSTEMS     A 12 point review of system was performed and was negative except as outlined in the history of present illness.     HOME MEDICATIONS     Current Outpatient Rx   Medication Sig Dispense Refill     " acetaminophen (TYLENOL) 325 MG tablet Take 2 tablets (650 mg) by mouth every 4 hours as needed for other (mild pain) 100 tablet 0     ammonium lactate (AMLACTIN) 12 % external cream Apply topically daily as needed for dry skin       azelastine (ASTELIN) 0.1 % nasal spray Spray 1 spray into both nostrils every morning       calcium carbonate-vitamin D 600-200 MG-UNIT TABS Take 1 tablet by mouth daily.       donepezil (ARICEPT) 10 MG tablet Take 1 tablet (10 mg) by mouth At Bedtime 90 tablet 3     DULoxetine (CYMBALTA) 60 MG capsule Take 60 mg by mouth daily        famotidine (PEPCID) 40 MG tablet Take 20 mg by mouth every evening        ferrous sulfate (FEROSUL) 325 (65 Fe) MG tablet Take 325 mg by mouth every morning       fluocin-hydroquinone-tretinoin (TRI-RODNEY) 0.01-4-0.05 % CREA Externally apply topically At Bedtime 30 g 3     fluticasone (FLONASE) 50 MCG/ACT nasal spray INSTILL 1 SPRAY INTO EACH NOSTRIL ONCE DAILY AS NEEDED FOR RHINITIS       folic acid (FOLVITE) 1 MG tablet Take 1 tablet (1 mg) by mouth daily 90 tablet 3     glucosamine-chondroitin 500-400 MG CAPS Take 1 capsule by mouth daily.       levocetirizine (XYZAL) 5 MG tablet Take 5 mg by mouth every morning       losartan (COZAAR) 100 MG tablet Take 100 mg by mouth daily        methocarbamol (ROBAXIN) 500 MG tablet TAKE 1 TABLET BY MOUTH 3 TIMES A DAY       multivitamin w/minerals (THERA-VIT-M) tablet Take 1 tablet by mouth daily.       Omega-3 Fatty Acids (OMEGA-3 FISH OIL) 1200 MG CAPS Take 1,200 g by mouth every 7 days on Friday       omeprazole (PRILOSEC) 20 MG DR capsule Take 20 mg by mouth every morning        pravastatin (PRAVACHOL) 20 MG tablet Take 20 mg by mouth daily        pregabalin (LYRICA) 75 MG capsule Take 75 mg by mouth 2 times daily Patient taking 50 mg in am and 75 mg in pm       senna-docusate (SENOKOT-S/PERICOLACE) 8.6-50 MG tablet Take 1-2 tablets by mouth 2 times daily Take while on oral narcotics to prevent or treat  "constipation. 30 tablet 0     Vitamin D3 (CHOLECALCIFEROL) 25 mcg (1000 units) tablet Take 1,000 Units by mouth daily        zolpidem (AMBIEN) 5 MG tablet Take 5 mg by mouth At Bedtime            PHYSICAL EXAM     Vital signs  Ht 1.626 m (5' 4\")   Wt 63.5 kg (140 lb)   BMI 24.03 kg/m      Weight:   140 lbs 0 oz    Patient is alert and oriented x3 speech was normal with no dysarthria or aphasia.  Extraocular movements are intact face symmetrical.  She moves all 4 extremities.  Rest of the exam was not possible on a video visit     PERTINENT DIAGNOSTIC STUDIES     Following studies were reviewed:     CT CERVICAL SPINE 11/26/2021  1.  Variable degrees of mild degenerative change throughout the cervical spine. No evidence of significant spinal canal stenosis. Variable degrees of mild foraminal narrowing due to uncovertebral joint and facet hypertrophic change at the above-mentioned   levels. Please see body of report for details as above. No acute findings.     CT BRAIN 12/17/2020  1.  No acute intracranial process.  2.  Minimal chronic small vessel ischemic disease.     CT LUMBAR SPINE 9/23/2020  1. New but subacute to chronic appearing fractures of L2, including mild  superior endplate compression and incompletely healed bilateral pedicle  fractures, which on the left extend into the posterosuperior vertebral body.  2. Postoperative changes including anterior and posterior instrumented fusion at  L5-S1. No hardware failure. No advanced spinal canal or neural foraminal  narrowing at any level.     MRI LUMBAR SPINE 5/23/2022  1.  Postoperative change of L5 through sacral fusion and laminectomy.  2.  Multilevel degenerative change.  3.  No high-grade stenoses.    MRI BRAIN 9/13/2021  1. No evidence of acute intracranial hemorrhage, mass effect, or infarction.  2. Mild nonspecific white matter changes.  3. Mild brain parenchymal volume loss.     MRI CERVICAL SPINE 7/12/2021  1.  Mild central canal and left foraminal " narrowing at C6-7  2.  Mild degenerative changes within the remainder of the cervical spine as above     MRI, MRA BRAIN 2/26/2021  HEAD MRI:   1.  Mild chronic small vessel ischemic changes and age-related changes.  2.  Otherwise normal brain MRI.    HEAD MRA:   1.  Normal MRA [place] [facility] [place].    NECK MRA:  1.  Minimal plaque in the right carotid bulb, but no significant associated stenosis by NASCET criteria.  2.   Otherwise normal neck MRA.     MRI LUMBAR SPINE 9/23/2020  No new spondylolisthesis since 05/04/2020. Interval subacute  appearing superior L2 compression fracture and bilateral pedicle fractures.  Stable chronic healing fracture through the posterolateral left L3 vertebral  body and pedicle.     BRAIN PET 11/12/2021  FDG metabolic pattern of the brain demonstrates  hypometabolism in the parietal lobes (right greater then left) and  lesser loss in frontal, this pattern can be seen with early  Alzheimer's disease.     EMG 5/31/2022  This is a normal nerve conduction and EMG study of bilateral lower extremities    EMG 7/13/2021  1.  This is a normal electrodiagnostic study of bilateral lower extremities  2.  Specifically there is no electrodiagnostic evidence of a lumbar radiculopathy, plexopathy or peripheral apathy     NEUROPSYCHOLOGICAL EVALUATION 10/18/2021  DIAGNOSIS:  Unspecified Mild Neurocognitive Disorder     Adjustment Disorder with Anxiety      RECOMMENDATIONS:  1) Ongoing neurologic care and monitoring is recommended.      2) If not medically contraindicated, Ms. De La Rosa may benefit from an empirical trial of a cognitive-enhancing medication.      3) Consider a PET scan to help clarify etiology of Ms. De La Rosa's cognitive decline.     4) Consider a referral to Medication Therapy Management (pharmacist) for a review of Ms. De La Rosa's current regimen and to provide recommendations in order to minimize any unwanted side effects.     5) Additional caregiving support for Ms. De La Rosa is crucial. There are  several cultural factors at play, but I would highly encourage Ms. De La Rosa to consider formal in-home care giving services to assist her so that she can have some respite. Some other ideas for increased support include hiring a caregiver to come at night so that she can sleep, hiring a cook who is familiar at preparing [place] cuisine, or even having regularly scheduled times of respite when her daughters can attend to their dad so she can engage in her own self-care activities (e.g., every Saturday and Sunday from 12-5 PM).     6) Although the patient has declined this in the past, I would encourage her to consider establishing care with a psychotherapist for additional support. If she prefers a provider who is more familiar with her culture, there are a couple of options that I found in the community:  Jenny Mak of [facility] in [place] ([phone])  Fran Gutierrez of [employer] in [facility] ([phone])     7) Given her mild memory issues, occasional oversight is encouraged with more complex and/or novel tasks to ensure accuracy and safety (e.g., medication and financial management). She should allow herself extra time to complete these types of tasks as well.     8) The patient is encouraged to utilize cognitive compensatory strategies in daily life, including utilizing note pads, checklists, to-do lists, a calendar/planner, labeled alarm reminders, a GPS, a pillbox, and maintaining a daily morning and nighttime routine and an organized living/work environment.     9) Ms. De La Rosa is strongly encouraged to adhere closely to her medication regimen to help keep her cerebrovascular risk factors (e.g., hyperlipidemia) and other medical conditions well controlled. This may help prevent further exacerbations in cognitive decline in the future.     10) Neuropsychological follow-up is recommended in 12-18 months (or as clinically indicated) in order to monitor her  cognitive status, help to clarify etiology, and update recommendations. The current test data can be used as a baseline to which future comparisons can be made.     NEUROPSYCHOLOGICAL EVALUATION 3/25/2021  No Cognitive Disorder     Adjustment Disorder with Anxiety     RECOMMENDATIONS:  1) Stress management strategies are strongly encouraged, as improvement in emotional distress will lead to perceived improvements in her cognitive functioning (and even her physical pain level to some degree) over time. Such strategies may include:  Participating in individual psychotherapy. If the patient is amenable, I will refer her to our psychologist (Dr. Flavia Rivera). I will have our schedulers call her to set up an appointment, or she can always call our clinic at a later time to establish care ([phone]).  Consider an adjustment to her medications for her mood/anxiety. She experienced side effects with increased dosing of Cymbalta; as such, consider an alternative medication for anxiety (e.g., a selective serotonin reuptake inhibitor). This may help with her daytime anxiety as well as the heightened anxiety she experiences at night (she fears her sleep medications will make her too sedated to respond to her 's needs, but her anxiety about this keeps her from sleeping and increases her hypervigilance at night as well). This recommendation will be deferred to her PCP.  Utilizing relaxation practices. I will mail her a handout with some techniques she may try on her own.  Attending caregiving support groups. She may find these through the Alzheimer's Association website or hotline (https://www.alz.org/ or by calling 280-912-9934 ) or through the [employer] Line ([place]://[place].gov/[employer]-line/ or by calling 398-850-4501).     2) In addition, a referral to a  or  or calling the [employer] Line may be helpful to assist her in seeking out respite services or formal in-home care giving  services to allow Ms. De La Rosa to take a break or re-engage in her hobbies and interests.     3) Ms. De La Rosa is also encouraged to follow up with her medical providers for ongoing pain management. Improvements in her pain may help her stress level and cognitive functioning to some degree.     4) Ms. De La Rosa should adhere closely to her medication regimen in order to manage her cerebrovascular risk factors (hyperlipidemia), pain, and mood. This may help to prevent future cognitive decline.     5) The patient is encouraged to remain physically, socially, and mentally active for optimal brain health. This may also help to improve her emotional wellbeing.     6) From a neurocognitive standpoint, I have no concerns about the patient's ability to independently perform IADLs or care for her .      7) Cognitive strategies may be helpful for her own reassurance. These may include utilizing note pads, checklists, to-do lists, alarm reminders, a detailed calendar/planner, a GPS, a pillbox, and maintaining a daily morning and nighttime routine and an organized living environment. She should avoid multi-tasking when possible and should attempt to reduce as much distraction as possible while performing complex or important tasks.     8) Neuropsychological follow-up is not warranted at this time. However, the current test data can now serve as a baseline should a repeat assessment be indicated in the future     PERTINENT LABS  Following labs were reviewed:  No visits with results within 3 Month(s) from this visit.   Latest known visit with results is:   Admission on 01/18/2022, Discharged on 01/19/2022   Component Date Value     Hemoglobin 01/19/2022 8.9 (A)     Creatinine 01/19/2022 0.52      GFR Estimate 01/19/2022 >90      Glucose 01/19/2022 89      Patient Fasting > 8hrs? 01/19/2022 No       VIDEO VISIT DETAILS  Patient is being evaluated via a billable video visit.   Patient would like the video invitation sent by: [x]E-mail   []Path101   Type of service: Video Visit  Video Start Time: 10:53 AM  Video End Time (time video stopped): 11:01 AM  Originating Location (Patient Location): Patient's Home  Distant Location (provider location): [facility] [place]   Mode of Communication: Video Conference via [x]Jose []Doximity     Total time spent for face to face visit, reviewing labs/imaging studies, counseling and coordination of care was: 30 Minutes spent on the date of the encounter doing chart review, review of outside records, review of test results, interpretation of tests, patient visit and documentation     This note was dictated using voice recognition software.  Any grammatical or context distortions are unintentional and inherent to the software.    Orders Placed This Encounter   Procedures     Hepatic function panel      New Prescriptions    No medications on file     Modified Medications    Modified Medication Previous Medication    DONEPEZIL (ARICEPT) 10 MG TABLET donepezil (ARICEPT) 10 MG tablet       Take 1 tablet (10 mg) by mouth At Bedtime    Take 1 tablet (10 mg) by mouth At Bedtime

## 2022-11-23 NOTE — TELEPHONE ENCOUNTER
Spoke with daughter, Lucie.  Kurt has been taking her methotrexate wrong. She has been taking 2 tablets daily for the last 3 days, instead of 2 tablets for the first week.  Patient was to increase to 6 tablets weekly if tolerated.  Patient has had significant nausea.  No vomiting.  Pain and palliative wants her to start methadone for her neck pain, but wanted her to be established on methotrexate first.  Daughter is wondering how they should proceed?      Rianna Julio RN

## 2022-11-23 NOTE — TELEPHONE ENCOUNTER
Thanks for the update.  Sorry about the misinterpretation of medication directions.  I recommend going right up to 6 tablets of methotrexate, no earlier than 1 week after the last dose that the patient took.  Nausea can often be alleviated by acetaminophen.  So I recommend taking 500 mg acetaminophen 2 tablets 1 hour before dosing methotrexate 6 tablets.  Also take folic acid 1 mg daily, except on the day that methotrexate is dosed.  Give the methotrexate a couple of weeks to settle in.

## 2022-11-23 NOTE — TELEPHONE ENCOUNTER
Patient's daughter Lucie is calling stating patient is having issues with methotrexate. Patient wants to stop taking it but Lucie is hesitant to have her stop it. Would like to discuss before the holiday.

## 2022-12-01 NOTE — TELEPHONE ENCOUNTER
Pt's daughter Lucie called to clarify Neuropsyc order. When she called to get scheduled, she was told it is not needed, as pt has had it done virtually. Lucie understood that  wanted a in person eval. [phone] ok to lm   01-Dec-2022 23:33

## 2022-12-08 NOTE — PROGRESS NOTES
NEUROLOGY FOLLOW UP VISIT  NOTE       [facility]UR[facility] [place]  [address] [place] [address]  Tel: [phone]  Fax: [phone]  [email or web address]     Leonardo De La Rosa,  1945, MRN [id number]  PCP: Cassie Ortega  Date: 2022      ASSESSMENT & PLAN     Visit Diagnosis  Late onset Alzheimer's dementia without behavioral disturbance (H)     Late onset Alzheimer's dementia without behavioral disturbances  76-year-old female with SNHL, osteoporosis, GERD, chronic pain syndrome who is been followed in our clinic for with late onset Alzheimer's dementia.  She recently fell down again fracturing her right forearm.  She also was complaining of excessive neck pain that progressively has gotten worse and is being followed at the pain clinic.  There was a concern she was using too much pain medication and after the death of her  she appears quite depressed and some of her symptoms could be psychosomatic.  Her daughter from [place] is planning on taking her to [place] hoping a change in scenery would help.  Previous work-up for her dementia included MRI brain and MRA that showed age-related changes.  Neuropsychological testing initially was normal but a subsequent neuropsychological testing suggested mild neurocognitive disorder.  Brain PET scan was abnormal showing hypometabolism in the parietal lobe right greater than left and lesser loss in the frontal region.  This suggested the diagnosis of Alzheimer's dementia.  She is currently on Aricept.  I have recommended:    1.  Continue Aricept 10 mg daily  2.  I told her to minimize the use of pain medication and try to use nonpharmacological measures as much as possible  3.  She is mourning the loss of her  who suddenly passed away recently and although currently is on Cymbalta still struggling with depression.  She might need referral to psychiatry to better manage her depression.  4.  After she returns  from [place], if she continues to struggle with memory I will consider adding Namenda and for behavioral issues may be consider low-dose Zyprexa.  5.  I have also placed consult for care coordination as family wants to look into options for assisted living facility.  6.  Follow-up will be in 6 months    Thank you again for this referral, please feel free to contact me if you have any questions.    Augustin Terry MD  [facility]  (Formerly, [facility]     HISTORY OF PRESENT ILLNESS     Patient is a 76-year-old female with history of SNHL, osteoporosis, GERD who was seen sooner than her scheduled visit for her Alzheimer's dementia.  Her daughter who is also my patient was concerned with the rapid decline after passing away of patient's .  She seems more confused, flustered.  Previously she had an extensive work-up including MRI brain, MRA and neuropsychological testing that was initially normal but subsequent neuropsychological testing suggested mild neurocognitive disorder due to Alzheimer's dementia.  Brain PET scan was abnormal showing hypometabolism in parietal lobe right greater than left with lesser loss in the frontal region suggesting the diagnosis of Alzheimer's dementia.  She was started on Aricept 10 mg daily.  Her other issue is gait instability for which she had extensive work-up.  She had MRI of the lumbar spine and EMG that did not show any abnormality.  Lab work for myopathy was not normal.     Since her last visit she has been followed at the pain clinic for severe right-sided neck pain with radiation into the right occipital area.  Although she had this pain for several years she claims the pain has progressively gotten worse.  Cervical spine x-rays and MRI scan shows multilevel spondylosis but no high-grade stenosis.  She had steroid injections with some relief and then had facet joint medial branch block that did not seem to help.   She was taking increased amount of pain medication including Dilaudid, Robaxin, Tylenol and ibuprofen.  Since the death of her  she does feel quite despondent and has been complaining of increased discomfort.  Her family has tried to limit her pain medication intake that has resulted in some family strife.  Although patient admits she is depressed but does not think her pain has anything to do with her mental health issues.  In addition to steroid injection, facet medial branch block, acupuncture, PT and pool therapy she had also taken multiple medication but continues to be symptomatic.  Her family is encouraging her to be in an assisted living facility but she does not want to move from her house.  Her daughter was visiting from [place] plans on taking her with her for some change of scenery and hopefully her depression will improve.  She recently again fell down fracturing her right forearm requiring surgery.     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    Surgical History:  She  has a past surgical history that includes TOTAL KNEE ARTHROPLASTY; Foot surgery; Hysterectomy total abdominal, bilateral salpingo-oophorectomy, combined; Arthrodesis foot (4/22/2013); Bunionectomy lapidus with tarsal metatarsal (TMT) fusion (4/22/2013); Hysterectomy; knee surgery; Foot surgery;  "and Arthroplasty knee (Right, 1/18/2022).     SOCIAL HISTORY     Reviewed, and she  reports that she has never smoked. She has never used smokeless tobacco. She reports that she does not drink alcohol and does not use drugs.     FAMILY HISTORY     Reviewed, and family history includes Coronary Artery Disease in her mother.     ALLERGIES     Allergies   Allergen Reactions     Estratest H.S. Other (See Comments) and Unknown     Other reaction(s): Other (see comments)  Increase lipids  Outside source did not list reaction  Increase lipids       Hydrocodone      Oxycodone      Propofol Nausea and Vomiting     Outside source states \"injectable and inhaled\"  Outside source states \"injectable and inhaled\"  Outside source states \"injectable and inhaled\"  Outside source states \"injectable and inhaled\"       Reglan      Estrogens Rash     Other reaction(s): Other (see comments)  Outside source did not list reaction  Estrogen patches, from patch only, ( Adhesive )  does tolerate estrogen other forms        Penicillins Nausea and Vomiting and Nausea     Other reaction(s): GI intolerance, GI Upset  Patient states upset stomach.  Patient states upset stomach.  Patient states upset stomach.  Patient states upset stomach.           REVIEW OF SYSTEMS     A 12 point review of system was performed and was negative except as outlined in the history of present illness.     HOME MEDICATIONS     Current Outpatient Rx   Medication Sig Dispense Refill     acetaminophen (TYLENOL) 325 MG tablet Take 2 tablets (650 mg) by mouth every 4 hours as needed for other (mild pain) 100 tablet 0     ammonium lactate (AMLACTIN) 12 % external cream Apply topically daily as needed for dry skin       azelastine (ASTELIN) 0.1 % nasal spray Spray 1 spray into both nostrils every morning       calcium carbonate-vitamin D 600-200 MG-UNIT TABS Take 1 tablet by mouth daily.       donepezil (ARICEPT) 10 MG tablet Take 1 tablet (10 mg) by mouth At Bedtime 90 tablet " 3     DULoxetine (CYMBALTA) 60 MG capsule Take 60 mg by mouth daily        famotidine (PEPCID) 40 MG tablet Take 20 mg by mouth every evening        ferrous sulfate (FEROSUL) 325 (65 Fe) MG tablet Take 325 mg by mouth every morning       fluocin-hydroquinone-tretinoin (TRI-RODNEY) 0.01-4-0.05 % CREA Externally apply topically At Bedtime 30 g 3     fluticasone (FLONASE) 50 MCG/ACT nasal spray INSTILL 1 SPRAY INTO EACH NOSTRIL ONCE DAILY AS NEEDED FOR RHINITIS       folic acid (FOLVITE) 1 MG tablet Take 1 tablet (1 mg) by mouth daily 90 tablet 3     folic acid (FOLVITE) 1 MG tablet Take 1 tablet (1 mg) by mouth daily 90 tablet 3     glucosamine-chondroitin 500-400 MG CAPS Take 1 capsule by mouth daily.       levocetirizine (XYZAL) 5 MG tablet Take 5 mg by mouth every morning       losartan (COZAAR) 100 MG tablet Take 100 mg by mouth daily        methocarbamol (ROBAXIN) 500 MG tablet TAKE 1 TABLET BY MOUTH 3 TIMES A DAY       methotrexate 2.5 MG tablet Take 2 tablets beginning week 1, if tolerated, increase to 6 tablets on week 2 and continue that dose weekly until seen.  Labs every 3 months for refills. 72 tablet 1     multivitamin w/minerals (THERA-VIT-M) tablet Take 1 tablet by mouth daily.       Omega-3 Fatty Acids (OMEGA-3 FISH OIL) 1200 MG CAPS Take 1,200 g by mouth every 7 days on Friday       omeprazole (PRILOSEC) 20 MG DR capsule Take 20 mg by mouth every morning        pravastatin (PRAVACHOL) 20 MG tablet Take 20 mg by mouth daily        pregabalin (LYRICA) 75 MG capsule Take 75 mg by mouth 2 times daily Patient taking 50 mg in am and 75 mg in pm       senna-docusate (SENOKOT-S/PERICOLACE) 8.6-50 MG tablet Take 1-2 tablets by mouth 2 times daily Take while on oral narcotics to prevent or treat constipation. 30 tablet 0     Vitamin D3 (CHOLECALCIFEROL) 25 mcg (1000 units) tablet Take 1,000 Units by mouth daily        zolpidem (AMBIEN) 5 MG tablet Take 5 mg by mouth At Bedtime            PHYSICAL EXAM     Vital  "signs  /69 (BP Location: Left arm, Patient Position: Sitting)   Pulse 86   Ht 1.626 m (5' 4\")   Wt 64.4 kg (142 lb)   BMI 24.37 kg/m      Weight:   142 lbs 0 oz    Patient is alert and oriented x3 somewhat depressed vital signs are reviewed and are documented in electronic medical record.  Speech was normal cranial nerves II through XII are intact except she is hard of hearing.  Strength is 5/5 in the upper extremity and lower extremity 4+/5 sensation intact reflexes absent gait normal     PERTINENT DIAGNOSTIC STUDIES     Following studies were reviewed:     CTA HEAD AND NECK 12/5/2022  HEAD CT:   1.  No CT evidence for acute intracranial process.   2.  Minor right frontal scalp contusion without associated fracture.   3.  Brain atrophy and presumed chronic microvascular ischemic changes as above.     HEAD CTA:   1.  No significant stenosis, aneurysm, or high flow vascular malformation identified.     NECK CTA:   1.  No hemodynamically significant stenosis in the neck vessels by NASCET criteria.   2.  No evidence for dissection.    CT FACIAL BONE 12/5/2022  1.  No acute maxillofacial fracture or malalignment.   2.  Suggestion of mild right periorbital and premalar soft tissue swelling.      CT CERVICAL SPINE 11/26/2021  1.  Variable degrees of mild degenerative change throughout the cervical spine. No evidence of significant spinal canal stenosis. Variable degrees of mild foraminal narrowing due to uncovertebral joint and facet hypertrophic change at the above-mentioned   levels. Please see body of report for details as above. No acute findings.     CT BRAIN 12/17/2020  1.  No acute intracranial process.  2.  Minimal chronic small vessel ischemic disease.     CT LUMBAR SPINE 9/23/2020  1. New but subacute to chronic appearing fractures of L2, including mild  superior endplate compression and incompletely healed bilateral pedicle  fractures, which on the left extend into the posterosuperior vertebral " body.  2. Postoperative changes including anterior and posterior instrumented fusion at  L5-S1. No hardware failure. No advanced spinal canal or neural foraminal  narrowing at any level.     MRI LUMBAR SPINE 5/23/2022  1.  Postoperative change of L5 through sacral fusion and laminectomy.  2.  Multilevel degenerative change.  3.  No high-grade stenoses.     MRI BRAIN 9/13/2021  1. No evidence of acute intracranial hemorrhage, mass effect, or infarction.  2. Mild nonspecific white matter changes.  3. Mild brain parenchymal volume loss.     MRI CERVICAL SPINE 7/12/2021  1.  Mild central canal and left foraminal narrowing at C6-7  2.  Mild degenerative changes within the remainder of the cervical spine as above     MRI, MRA BRAIN 2/26/2021  HEAD MRI:   1.  Mild chronic small vessel ischemic changes and age-related changes.  2.  Otherwise normal brain MRI.    HEAD MRA:   1.  Normal MRA [place] [facility] [place].    NECK MRA:  1.  Minimal plaque in the right carotid bulb, but no significant associated stenosis by NASCET criteria.  2.   Otherwise normal neck MRA.     MRI LUMBAR SPINE 9/23/2020  No new spondylolisthesis since 05/04/2020. Interval subacute  appearing superior L2 compression fracture and bilateral pedicle fractures.  Stable chronic healing fracture through the posterolateral left L3 vertebral  body and pedicle.     BRAIN PET 11/12/2021  FDG metabolic pattern of the brain demonstrates  hypometabolism in the parietal lobes (right greater then left) and  lesser loss in frontal, this pattern can be seen with early  Alzheimer's disease.     EMG 5/31/2022  This is a normal nerve conduction and EMG study of bilateral lower extremities     EMG 7/13/2021  1.  This is a normal electrodiagnostic study of bilateral lower extremities  2.  Specifically there is no electrodiagnostic evidence of a lumbar radiculopathy, plexopathy or peripheral apathy     NEUROPSYCHOLOGICAL EVALUATION 10/18/2021  DIAGNOSIS:  Unspecified Mild  Neurocognitive Disorder     Adjustment Disorder with Anxiety      RECOMMENDATIONS:  1) Ongoing neurologic care and monitoring is recommended.      2) If not medically contraindicated, Ms. De La Rosa may benefit from an empirical trial of a cognitive-enhancing medication.      3) Consider a PET scan to help clarify etiology of Ms. De La Rosa's cognitive decline.     4) Consider a referral to Medication Therapy Management (pharmacist) for a review of Ms. De La Rosa's current regimen and to provide recommendations in order to minimize any unwanted side effects.     5) Additional caregiving support for Ms. De La Rosa is crucial. There are several cultural factors at play, but I would highly encourage Ms. De La Rosa to consider formal in-home care giving services to assist her so that she can have some respite. Some other ideas for increased support include hiring a caregiver to come at night so that she can sleep, hiring a cook who is familiar at preparing  cuisine, or even having regularly scheduled times of respite when her daughters can attend to their dad so she can engage in her own self-care activities (e.g., every Saturday and Sunday from 12-5 PM).     6) Although the patient has declined this in the past, I would encourage her to consider establishing care with a psychotherapist for additional support. If she prefers a provider who is more familiar with her culture, there are a couple of options that I found in the community:  Jenny Mak of [facility] in [place] ([phone])  Fran Gutierrez of [facility] in [facility] ([phone])     7) Given her mild memory issues, occasional oversight is encouraged with more complex and/or novel tasks to ensure accuracy and safety (e.g., medication and financial management). She should allow herself extra time to complete these types of tasks as well.     8) The patient is encouraged to utilize cognitive compensatory strategies in daily  life, including utilizing note pads, checklists, to-do lists, a calendar/planner, labeled alarm reminders, a GPS, a pillbox, and maintaining a daily morning and nighttime routine and an organized living/work environment.     9) Ms. De La Rosa is strongly encouraged to adhere closely to her medication regimen to help keep her cerebrovascular risk factors (e.g., hyperlipidemia) and other medical conditions well controlled. This may help prevent further exacerbations in cognitive decline in the future.     10) Neuropsychological follow-up is recommended in 12-18 months (or as clinically indicated) in order to monitor her cognitive status, help to clarify etiology, and update recommendations. The current test data can be used as a baseline to which future comparisons can be made.     NEUROPSYCHOLOGICAL EVALUATION 3/25/2021  No Cognitive Disorder     Adjustment Disorder with Anxiety     RECOMMENDATIONS:  1) Stress management strategies are strongly encouraged, as improvement in emotional distress will lead to perceived improvements in her cognitive functioning (and even her physical pain level to some degree) over time. Such strategies may include:  Participating in individual psychotherapy. If the patient is amenable, I will refer her to our psychologist (Dr. Flavia Rivera). I will have our schedulers call her to set up an appointment, or she can always call our clinic at a later time to establish care ([phone]).  Consider an adjustment to her medications for her mood/anxiety. She experienced side effects with increased dosing of Cymbalta; as such, consider an alternative medication for anxiety (e.g., a selective serotonin reuptake inhibitor). This may help with her daytime anxiety as well as the heightened anxiety she experiences at night (she fears her sleep medications will make her too sedated to respond to her 's needs, but her anxiety about this keeps her from sleeping and increases her hypervigilance at  night as well). This recommendation will be deferred to her PCP.  Utilizing relaxation practices. I will mail her a handout with some techniques she may try on her own.  Attending caregiving support groups. She may find these through the Alzheimer's Association website or hotline (https://www.alz.org/ or by calling 611-634-6543 ) or through the [employer] Line ([place]://[place].gov/[employer]-line/ or by calling 120-594-6231).     2) In addition, a referral to a  or  or calling the [employer] Line may be helpful to assist her in seeking out respite services or formal in-home care giving services to allow Ms. De La Rosa to take a break or re-engage in her hobbies and interests.     3) Ms. De La Rosa is also encouraged to follow up with her medical providers for ongoing pain management. Improvements in her pain may help her stress level and cognitive functioning to some degree.     4) Ms. De La Rosa should adhere closely to her medication regimen in order to manage her cerebrovascular risk factors (hyperlipidemia), pain, and mood. This may help to prevent future cognitive decline.     5) The patient is encouraged to remain physically, socially, and mentally active for optimal brain health. This may also help to improve her emotional wellbeing.     6) From a neurocognitive standpoint, I have no concerns about the patient's ability to independently perform IADLs or care for her .      7) Cognitive strategies may be helpful for her own reassurance. These may include utilizing note pads, checklists, to-do lists, alarm reminders, a detailed calendar/planner, a GPS, a pillbox, and maintaining a daily morning and nighttime routine and an organized living environment. She should avoid multi-tasking when possible and should attempt to reduce as much distraction as possible while performing complex or important tasks.     8) Neuropsychological follow-up is not warranted at this time. However, the current  test data can now serve as a baseline should a repeat assessment be indicated in the future     PERTINENT LABS  Following labs were reviewed:  No visits with results within 3 Month(s) from this visit.   Latest known visit with results is:   Admission on 01/18/2022, Discharged on 01/19/2022   Component Date Value     Hemoglobin 01/19/2022 8.9 (L)      Creatinine 01/19/2022 0.52      GFR Estimate 01/19/2022 >90      Glucose 01/19/2022 89      Patient Fasting > 8hrs? 01/19/2022 No          Total time spent for face to face visit, reviewing labs/imaging studies, counseling and coordination of care was: 30 Minutes spent on the date of the encounter doing chart review, review of outside records, review of test results, interpretation of tests, patient visit, documentation and discussion with family     This note was dictated using voice recognition software.  Any grammatical or context distortions are unintentional and inherent to the software.    Orders Placed This Encounter   Procedures     Primary Care - Care Coordination Referral      New Prescriptions    No medications on file     Modified Medications    No medications on file

## 2022-12-08 NOTE — NURSING NOTE
Chief Complaint   Patient presents with     Dementia     Pt states her children have noticed increased memory concerns     Balance/ Vestibular     Pt did have a fall about 10 days ago. She is having increased difficulty with balance.     MOCA 19    Flora Desouza LPN on 12/8/2022 at 12:07 PM

## 2022-12-08 NOTE — TELEPHONE ENCOUNTER
Patient has decided she would like a care coordination referral to talk with a . Please enter order  Leonora Martinez CMA on 12/8/2022 at 1:41 PM

## 2022-12-08 NOTE — NURSING NOTE
[place] COGNITIVE ASSESSMENT (MOCA)  Version 7.1 Original Version  VISUOSPATIAL/EXECUTIVE               COPY CUBE      [ 0   ]                                [  0  ] DRAW CLOCK (Ten past eleven)  (3 points)    [ 1   ]                    [  0  ]               [ 0   ]       Contour            Numbers     Hands POINTS                  1 / 5   NAMING    [ 1  ]                                                                        [ 0   ]                                             [ 1   ]  Lion                                                                      Amber                                Camel                   2  / 3   MEMORY Read list of words, subject must repeat them. Do 2 trials, even if 1st trial is successful. Do a recall after 5 minutes  FACE VELVET Oriental orthodox LUCAS RED No Points    1st          2nd         ATTENTION Read list of digits (1 digit/sec) Subject has to repeat in the forward order       [  1  ]   2  1  8  5  4                                [ 1   ] 7 4 2                          2/2   Read list of letters. The subject must tap with his hand at each letter A. No points if > 2 errors.  [  1  ] F B A C M N A A J K L B A F A K D E A A A J A M O F A A B             1 /1   Serial 7 subtraction starting at 100          [  1  ] 93         [ 1   ] 86          [ 1   ] 79          [  1  ] 72         [   1 ] 65   4 or 5 correct subtractions: 3 points,  2 or 3 correct: 2 points,  1correct: 1 point,   0 correct: 0 points            3/3   LANGUAGE Repeat: I only know that Kyler is the one to help today. [  1   ]                                      The cat always hid under the couch when dogs were in the room. [ 1  ]              2 /2   Fluency: Name maximum number of words in one minute that begin with the letter F                                                                                                                    [ 0   ] __4_ (N > 11 words)              0 /1   ABSTRACTION Similarity  between e.g. banana-orange=fruit                                                                   [  1  ] train-bicycle                      [ 0   ] watch-ruler             1 /2   DELAYED  RECALL Has to recall words  WITH NO CUE FACE  [  0  ] VELVET  [ 0   ] Cheondoism  [   1 ]  LUCAS  [0    ] RED  [ 1   ] Points for UNCUED recall only           2 /5           OPTIONAL Category cue           Multiple choice cue          ORIENTATION  [ 1   ] Date     [  1  ] Month       [  1  ] Year      [ 1   ] Day      [  1  ] Place        [  1  ] City         6 /6   TOTAL  Normal > 26/30 Add 1 point if < 12 years education      19 /30

## 2022-12-08 NOTE — LETTER
2022         RE: Leonardo De La Rosa  [address] [facility] [address]        Dear Colleague,    Thank you for referring your patient, Leonardo De La Rosa, to the [facility] [place]. Please see a copy of my visit note below.    NEUROLOGY FOLLOW UP VISIT  NOTE       [facility] [place]  [address] [place] [address]  Tel: [phone]  Fax: [phone]  [email or web address]     Leonardo De La Rosa,  1945, MRN [id number]  PCP: Cassie Ortega  Date: 2022      ASSESSMENT & PLAN     Visit Diagnosis  1. Late onset Alzheimer's dementia without behavioral disturbance (H)     Late onset Alzheimer's dementia without behavioral disturbances  76-year-old female with SNHL, osteoporosis, GERD, chronic pain syndrome who is been followed in our clinic for with late onset Alzheimer's dementia.  She recently fell down again fracturing her right forearm.  She also was complaining of excessive neck pain that progressively has gotten worse and is being followed at the pain clinic.  There was a concern she was using too much pain medication and after the death of her  she appears quite depressed and some of her symptoms could be psychosomatic.  Her daughter from [place] is planning on taking her to [place] hoping a change in scenery would help.  Previous work-up for her dementia included MRI brain and MRA that showed age-related changes.  Neuropsychological testing initially was normal but a subsequent neuropsychological testing suggested mild neurocognitive disorder.  Brain PET scan was abnormal showing hypometabolism in the parietal lobe right greater than left and lesser loss in the frontal region.  This suggested the diagnosis of Alzheimer's dementia.  She is currently on Aricept.  I have recommended:    1.  Continue Aricept 10 mg daily  2.  I told her to minimize the use of pain medication and try to use nonpharmacological measures as much  as possible  3.  She is mourning the loss of her  who suddenly passed away recently and although currently is on Cymbalta still struggling with depression.  She might need referral to psychiatry to better manage her depression.  4.  After she returns from [place], if she continues to struggle with memory I will consider adding Namenda and for behavioral issues may be consider low-dose Zyprexa.  5.  I have also placed consult for care coordination as family wants to look into options for assisted living facility.  6.  Follow-up will be in 6 months    Thank you again for this referral, please feel free to contact me if you have any questions.    Augustin Terry MD  [facility]  (Formerly, [facility]     HISTORY OF PRESENT ILLNESS     Patient is a 76-year-old female with history of SNHL, osteoporosis, GERD who was seen sooner than her scheduled visit for her Alzheimer's dementia.  Her daughter who is also my patient was concerned with the rapid decline after passing away of patient's .  She seems more confused, flustered.  Previously she had an extensive work-up including MRI brain, MRA and neuropsychological testing that was initially normal but subsequent neuropsychological testing suggested mild neurocognitive disorder due to Alzheimer's dementia.  Brain PET scan was abnormal showing hypometabolism in parietal lobe right greater than left with lesser loss in the frontal region suggesting the diagnosis of Alzheimer's dementia.  She was started on Aricept 10 mg daily.  Her other issue is gait instability for which she had extensive work-up.  She had MRI of the lumbar spine and EMG that did not show any abnormality.  Lab work for myopathy was not normal.     Since her last visit she has been followed at the pain clinic for severe right-sided neck pain with radiation into the right occipital area.  Although she had this pain for several years she  claims the pain has progressively gotten worse.  Cervical spine x-rays and MRI scan shows multilevel spondylosis but no high-grade stenosis.  She had steroid injections with some relief and then had facet joint medial branch block that did not seem to help.  She was taking increased amount of pain medication including Dilaudid, Robaxin, Tylenol and ibuprofen.  Since the death of her  she does feel quite despondent and has been complaining of increased discomfort.  Her family has tried to limit her pain medication intake that has resulted in some family strife.  Although patient admits she is depressed but does not think her pain has anything to do with her mental health issues.  In addition to steroid injection, facet medial branch block, acupuncture, PT and pool therapy she had also taken multiple medication but continues to be symptomatic.  Her family is encouraging her to be in an assisted living facility but she does not want to move from her house.  Her daughter was visiting from [place] plans on taking her with her for some change of scenery and hopefully her depression will improve.  She recently again fell down fracturing her right forearm requiring surgery.     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    Surgical History:  She  has a past surgical history that  "includes TOTAL KNEE ARTHROPLASTY; Foot surgery; Hysterectomy total abdominal, bilateral salpingo-oophorectomy, combined; Arthrodesis foot (4/22/2013); Bunionectomy lapidus with tarsal metatarsal (TMT) fusion (4/22/2013); Hysterectomy; knee surgery; Foot surgery; and Arthroplasty knee (Right, 1/18/2022).     SOCIAL HISTORY     Reviewed, and she  reports that she has never smoked. She has never used smokeless tobacco. She reports that she does not drink alcohol and does not use drugs.     FAMILY HISTORY     Reviewed, and family history includes Coronary Artery Disease in her mother.     ALLERGIES     Allergies   Allergen Reactions     Estratest H.S. Other (See Comments) and Unknown     Other reaction(s): Other (see comments)  Increase lipids  Outside source did not list reaction  Increase lipids       Hydrocodone      Oxycodone      Propofol Nausea and Vomiting     Outside source states \"injectable and inhaled\"  Outside source states \"injectable and inhaled\"  Outside source states \"injectable and inhaled\"  Outside source states \"injectable and inhaled\"       Reglan      Estrogens Rash     Other reaction(s): Other (see comments)  Outside source did not list reaction  Estrogen patches, from patch only, ( Adhesive )  does tolerate estrogen other forms        Penicillins Nausea and Vomiting and Nausea     Other reaction(s): GI intolerance, GI Upset  Patient states upset stomach.  Patient states upset stomach.  Patient states upset stomach.  Patient states upset stomach.           REVIEW OF SYSTEMS     A 12 point review of system was performed and was negative except as outlined in the history of present illness.     HOME MEDICATIONS     Current Outpatient Rx   Medication Sig Dispense Refill     acetaminophen (TYLENOL) 325 MG tablet Take 2 tablets (650 mg) by mouth every 4 hours as needed for other (mild pain) 100 tablet 0     ammonium lactate (AMLACTIN) 12 % external cream Apply topically daily as needed for dry skin   "     azelastine (ASTELIN) 0.1 % nasal spray Spray 1 spray into both nostrils every morning       calcium carbonate-vitamin D 600-200 MG-UNIT TABS Take 1 tablet by mouth daily.       donepezil (ARICEPT) 10 MG tablet Take 1 tablet (10 mg) by mouth At Bedtime 90 tablet 3     DULoxetine (CYMBALTA) 60 MG capsule Take 60 mg by mouth daily        famotidine (PEPCID) 40 MG tablet Take 20 mg by mouth every evening        ferrous sulfate (FEROSUL) 325 (65 Fe) MG tablet Take 325 mg by mouth every morning       fluocin-hydroquinone-tretinoin (TRI-RODNEY) 0.01-4-0.05 % CREA Externally apply topically At Bedtime 30 g 3     fluticasone (FLONASE) 50 MCG/ACT nasal spray INSTILL 1 SPRAY INTO EACH NOSTRIL ONCE DAILY AS NEEDED FOR RHINITIS       folic acid (FOLVITE) 1 MG tablet Take 1 tablet (1 mg) by mouth daily 90 tablet 3     folic acid (FOLVITE) 1 MG tablet Take 1 tablet (1 mg) by mouth daily 90 tablet 3     glucosamine-chondroitin 500-400 MG CAPS Take 1 capsule by mouth daily.       levocetirizine (XYZAL) 5 MG tablet Take 5 mg by mouth every morning       losartan (COZAAR) 100 MG tablet Take 100 mg by mouth daily        methocarbamol (ROBAXIN) 500 MG tablet TAKE 1 TABLET BY MOUTH 3 TIMES A DAY       methotrexate 2.5 MG tablet Take 2 tablets beginning week 1, if tolerated, increase to 6 tablets on week 2 and continue that dose weekly until seen.  Labs every 3 months for refills. 72 tablet 1     multivitamin w/minerals (THERA-VIT-M) tablet Take 1 tablet by mouth daily.       Omega-3 Fatty Acids (OMEGA-3 FISH OIL) 1200 MG CAPS Take 1,200 g by mouth every 7 days on Friday       omeprazole (PRILOSEC) 20 MG DR capsule Take 20 mg by mouth every morning        pravastatin (PRAVACHOL) 20 MG tablet Take 20 mg by mouth daily        pregabalin (LYRICA) 75 MG capsule Take 75 mg by mouth 2 times daily Patient taking 50 mg in am and 75 mg in pm       senna-docusate (SENOKOT-S/PERICOLACE) 8.6-50 MG tablet Take 1-2 tablets by mouth 2 times daily Take  "while on oral narcotics to prevent or treat constipation. 30 tablet 0     Vitamin D3 (CHOLECALCIFEROL) 25 mcg (1000 units) tablet Take 1,000 Units by mouth daily        zolpidem (AMBIEN) 5 MG tablet Take 5 mg by mouth At Bedtime            PHYSICAL EXAM     Vital signs  /69 (BP Location: Left arm, Patient Position: Sitting)   Pulse 86   Ht 1.626 m (5' 4\")   Wt 64.4 kg (142 lb)   BMI 24.37 kg/m      Weight:   142 lbs 0 oz    Patient is alert and oriented x3 somewhat depressed vital signs are reviewed and are documented in electronic medical record.  Speech was normal cranial nerves II through XII are intact except she is hard of hearing.  Strength is 5/5 in the upper extremity and lower extremity 4+/5 sensation intact reflexes absent gait normal     PERTINENT DIAGNOSTIC STUDIES     Following studies were reviewed:     CTA HEAD AND NECK 12/5/2022  HEAD CT:   1.  No CT evidence for acute intracranial process.   2.  Minor right frontal scalp contusion without associated fracture.   3.  Brain atrophy and presumed chronic microvascular ischemic changes as above.     HEAD CTA:   1.  No significant stenosis, aneurysm, or high flow vascular malformation identified.     NECK CTA:   1.  No hemodynamically significant stenosis in the neck vessels by NASCET criteria.   2.  No evidence for dissection.    CT FACIAL BONE 12/5/2022  1.  No acute maxillofacial fracture or malalignment.   2.  Suggestion of mild right periorbital and premalar soft tissue swelling.      CT CERVICAL SPINE 11/26/2021  1.  Variable degrees of mild degenerative change throughout the cervical spine. No evidence of significant spinal canal stenosis. Variable degrees of mild foraminal narrowing due to uncovertebral joint and facet hypertrophic change at the above-mentioned   levels. Please see body of report for details as above. No acute findings.     CT BRAIN 12/17/2020  1.  No acute intracranial process.  2.  Minimal chronic small vessel ischemic " disease.     CT LUMBAR SPINE 9/23/2020  1. New but subacute to chronic appearing fractures of L2, including mild  superior endplate compression and incompletely healed bilateral pedicle  fractures, which on the left extend into the posterosuperior vertebral body.  2. Postoperative changes including anterior and posterior instrumented fusion at  L5-S1. No hardware failure. No advanced spinal canal or neural foraminal  narrowing at any level.     MRI LUMBAR SPINE 5/23/2022  1.  Postoperative change of L5 through sacral fusion and laminectomy.  2.  Multilevel degenerative change.  3.  No high-grade stenoses.     MRI BRAIN 9/13/2021  1. No evidence of acute intracranial hemorrhage, mass effect, or infarction.  2. Mild nonspecific white matter changes.  3. Mild brain parenchymal volume loss.     MRI CERVICAL SPINE 7/12/2021  1.  Mild central canal and left foraminal narrowing at C6-7  2.  Mild degenerative changes within the remainder of the cervical spine as above     MRI, MRA BRAIN 2/26/2021  HEAD MRI:   1.  Mild chronic small vessel ischemic changes and age-related changes.  2.  Otherwise normal brain MRI.    HEAD MRA:   1.  Normal MRA [place].    NECK MRA:  1.  Minimal plaque in the right carotid bulb, but no significant associated stenosis by NASCET criteria.  2.   Otherwise normal neck MRA.     MRI LUMBAR SPINE 9/23/2020  No new spondylolisthesis since 05/04/2020. Interval subacute  appearing superior L2 compression fracture and bilateral pedicle fractures.  Stable chronic healing fracture through the posterolateral left L3 vertebral  body and pedicle.     BRAIN PET 11/12/2021  FDG metabolic pattern of the brain demonstrates  hypometabolism in the parietal lobes (right greater then left) and  lesser loss in frontal, this pattern can be seen with early  Alzheimer's disease.     EMG 5/31/2022  This is a normal nerve conduction and EMG study of bilateral lower extremities     EMG 7/13/2021  1.  This is a  normal electrodiagnostic study of bilateral lower extremities  2.  Specifically there is no electrodiagnostic evidence of a lumbar radiculopathy, plexopathy or peripheral apathy     NEUROPSYCHOLOGICAL EVALUATION 10/18/2021  DIAGNOSIS:  Unspecified Mild Neurocognitive Disorder     Adjustment Disorder with Anxiety      RECOMMENDATIONS:  1) Ongoing neurologic care and monitoring is recommended.      2) If not medically contraindicated, Ms. De La Rosa may benefit from an empirical trial of a cognitive-enhancing medication.      3) Consider a PET scan to help clarify etiology of Ms. De La Rosa's cognitive decline.     4) Consider a referral to Medication Therapy Management (pharmacist) for a review of Ms. De La Rosa's current regimen and to provide recommendations in order to minimize any unwanted side effects.     5) Additional caregiving support for Ms. De La Rosa is crucial. There are several cultural factors at play, but I would highly encourage Ms. De La Rosa to consider formal in-home care giving services to assist her so that she can have some respite. Some other ideas for increased support include hiring a caregiver to come at night so that she can sleep, hiring a cook who is familiar at preparing  cuisine, or even having regularly scheduled times of respite when her daughters can attend to their dad so she can engage in her own self-care activities (e.g., every Saturday and Sunday from 12-5 PM).     6) Although the patient has declined this in the past, I would encourage her to consider establishing care with a psychotherapist for additional support. If she prefers a provider who is more familiar with her culture, there are a couple of options that I found in the community:    Jenny Mak of [facility] in [place] ([phone])    Fran Gutierrez of [facility] in [facility] ([phone])     7) Given her mild memory issues, occasional oversight is encouraged with more complex  and/or novel tasks to ensure accuracy and safety (e.g., medication and financial management). She should allow herself extra time to complete these types of tasks as well.     8) The patient is encouraged to utilize cognitive compensatory strategies in daily life, including utilizing note pads, checklists, to-do lists, a calendar/planner, labeled alarm reminders, a GPS, a pillbox, and maintaining a daily morning and nighttime routine and an organized living/work environment.     9) Ms. De La Rosa is strongly encouraged to adhere closely to her medication regimen to help keep her cerebrovascular risk factors (e.g., hyperlipidemia) and other medical conditions well controlled. This may help prevent further exacerbations in cognitive decline in the future.     10) Neuropsychological follow-up is recommended in 12-18 months (or as clinically indicated) in order to monitor her cognitive status, help to clarify etiology, and update recommendations. The current test data can be used as a baseline to which future comparisons can be made.     NEUROPSYCHOLOGICAL EVALUATION 3/25/2021  No Cognitive Disorder     Adjustment Disorder with Anxiety     RECOMMENDATIONS:  1) Stress management strategies are strongly encouraged, as improvement in emotional distress will lead to perceived improvements in her cognitive functioning (and even her physical pain level to some degree) over time. Such strategies may include:  ? Participating in individual psychotherapy. If the patient is amenable, I will refer her to our psychologist (Dr. Flavia Rivera). I will have our schedulers call her to set up an appointment, or she can always call our clinic at a later time to establish care ([phone]).  ? Consider an adjustment to her medications for her mood/anxiety. She experienced side effects with increased dosing of Cymbalta; as such, consider an alternative medication for anxiety (e.g., a selective serotonin reuptake inhibitor). This may help with  her daytime anxiety as well as the heightened anxiety she experiences at night (she fears her sleep medications will make her too sedated to respond to her 's needs, but her anxiety about this keeps her from sleeping and increases her hypervigilance at night as well). This recommendation will be deferred to her PCP.  ? Utilizing relaxation practices. I will mail her a handout with some techniques she may try on her own.  ? Attending caregiving support groups. She may find these through the Alzheimer's Association website or hotline (https://www.alz.org/ or by calling 032-200-1546 ) or through the [employer] ([place]://[place].gov/[employer]/ or by calling 691-360-2341).     2) In addition, a referral to a  or  or calling the [employer] may be helpful to assist her in seeking out respite services or formal in-home care giving services to allow Ms. De La Rosa to take a break or re-engage in her hobbies and interests.     3) Ms. De La Rosa is also encouraged to follow up with her medical providers for ongoing pain management. Improvements in her pain may help her stress level and cognitive functioning to some degree.     4) Ms. De La Rosa should adhere closely to her medication regimen in order to manage her cerebrovascular risk factors (hyperlipidemia), pain, and mood. This may help to prevent future cognitive decline.     5) The patient is encouraged to remain physically, socially, and mentally active for optimal brain health. This may also help to improve her emotional wellbeing.     6) From a neurocognitive standpoint, I have no concerns about the patient's ability to independently perform IADLs or care for her .      7) Cognitive strategies may be helpful for her own reassurance. These may include utilizing note pads, checklists, to-do lists, alarm reminders, a detailed calendar/planner, a GPS, a pillbox, and maintaining a daily morning and nighttime routine and an  organized living environment. She should avoid multi-tasking when possible and should attempt to reduce as much distraction as possible while performing complex or important tasks.     8) Neuropsychological follow-up is not warranted at this time. However, the current test data can now serve as a baseline should a repeat assessment be indicated in the future     PERTINENT LABS  Following labs were reviewed:  No visits with results within 3 Month(s) from this visit.   Latest known visit with results is:   Admission on 01/18/2022, Discharged on 01/19/2022   Component Date Value     Hemoglobin 01/19/2022 8.9 (L)      Creatinine 01/19/2022 0.52      GFR Estimate 01/19/2022 >90      Glucose 01/19/2022 89      Patient Fasting > 8hrs? 01/19/2022 No          Total time spent for face to face visit, reviewing labs/imaging studies, counseling and coordination of care was: 30 Minutes spent on the date of the encounter doing chart review, review of outside records, review of test results, interpretation of tests, patient visit, documentation and discussion with family       This note was dictated using voice recognition software.  Any grammatical or context distortions are unintentional and inherent to the software.    Orders Placed This Encounter   Procedures     Primary Care - Care Coordination Referral      New Prescriptions    No medications on file     Modified Medications    No medications on file                     Again, thank you for allowing me to participate in the care of your patient.        Sincerely,        Augustin Terry MD

## 2023-01-01 ENCOUNTER — TRANSCRIBE ORDERS (OUTPATIENT)
Dept: OTHER | Age: 78
End: 2023-01-01

## 2023-01-01 ENCOUNTER — TRANSFERRED RECORDS (OUTPATIENT)
Dept: HEALTH INFORMATION MANAGEMENT | Facility: CLINIC | Age: 78
End: 2023-01-01
Payer: MEDICARE

## 2023-01-01 ENCOUNTER — TELEPHONE (OUTPATIENT)
Dept: NEUROSURGERY | Facility: CLINIC | Age: 78
End: 2023-01-01

## 2023-01-01 ENCOUNTER — TELEPHONE (OUTPATIENT)
Dept: PALLIATIVE MEDICINE | Facility: OTHER | Age: 78
End: 2023-01-01
Payer: MEDICARE

## 2023-01-01 ENCOUNTER — TELEPHONE (OUTPATIENT)
Dept: PALLIATIVE MEDICINE | Facility: CLINIC | Age: 78
End: 2023-01-01
Payer: MEDICARE

## 2023-01-01 ENCOUNTER — OFFICE VISIT (OUTPATIENT)
Dept: PALLIATIVE MEDICINE | Facility: CLINIC | Age: 78
End: 2023-01-01
Attending: PSYCHIATRY & NEUROLOGY
Payer: MEDICARE

## 2023-01-01 ENCOUNTER — VIRTUAL VISIT (OUTPATIENT)
Dept: PALLIATIVE MEDICINE | Facility: CLINIC | Age: 78
End: 2023-01-01
Payer: MEDICARE

## 2023-01-01 ENCOUNTER — TRANSFERRED RECORDS (OUTPATIENT)
Dept: HEALTH INFORMATION MANAGEMENT | Facility: CLINIC | Age: 78
End: 2023-01-01

## 2023-01-01 ENCOUNTER — HOSPITAL ENCOUNTER (EMERGENCY)
Facility: HOSPITAL | Age: 78
Discharge: HOME | End: 2023-03-07
Attending: EMERGENCY MEDICINE | Admitting: EMERGENCY MEDICINE
Payer: MEDICARE

## 2023-01-01 ENCOUNTER — MYC MEDICAL ADVICE (OUTPATIENT)
Dept: PALLIATIVE MEDICINE | Facility: CLINIC | Age: 78
End: 2023-01-01
Payer: MEDICARE

## 2023-01-01 ENCOUNTER — MYC MEDICAL ADVICE (OUTPATIENT)
Dept: NEUROLOGY | Facility: CLINIC | Age: 78
End: 2023-01-01
Payer: MEDICARE

## 2023-01-01 ENCOUNTER — NURSE TRIAGE (OUTPATIENT)
Dept: NURSING | Facility: CLINIC | Age: 78
End: 2023-01-01

## 2023-01-01 ENCOUNTER — TELEPHONE (OUTPATIENT)
Dept: NURSING | Facility: CLINIC | Age: 78
End: 2023-01-01

## 2023-01-01 ENCOUNTER — TELEPHONE (OUTPATIENT)
Dept: NEUROLOGY | Facility: CLINIC | Age: 78
End: 2023-01-01
Payer: MEDICARE

## 2023-01-01 ENCOUNTER — MYC MEDICAL ADVICE (OUTPATIENT)
Dept: PALLIATIVE MEDICINE | Facility: CLINIC | Age: 78
End: 2023-01-01

## 2023-01-01 VITALS — DIASTOLIC BLOOD PRESSURE: 80 MMHG | HEART RATE: 88 BPM | OXYGEN SATURATION: 99 % | SYSTOLIC BLOOD PRESSURE: 134 MMHG

## 2023-01-01 VITALS
TEMPERATURE: 97.1 F | HEART RATE: 88 BPM | SYSTOLIC BLOOD PRESSURE: 194 MMHG | RESPIRATION RATE: 16 BRPM | OXYGEN SATURATION: 97 % | DIASTOLIC BLOOD PRESSURE: 90 MMHG

## 2023-01-01 DIAGNOSIS — R11.10 VOMITING: ICD-10-CM

## 2023-01-01 DIAGNOSIS — G89.4 CHRONIC PAIN SYNDROME: Primary | ICD-10-CM

## 2023-01-01 DIAGNOSIS — G89.4 CHRONIC PAIN SYNDROME: ICD-10-CM

## 2023-01-01 DIAGNOSIS — M47.892 OTHER SPONDYLOSIS, CERVICAL REGION: Primary | ICD-10-CM

## 2023-01-01 DIAGNOSIS — M54.9 CHRONIC BACK PAIN, UNSPECIFIED BACK LOCATION, UNSPECIFIED BACK PAIN LATERALITY: Primary | ICD-10-CM

## 2023-01-01 DIAGNOSIS — M47.892 OTHER SPONDYLOSIS, CERVICAL REGION: ICD-10-CM

## 2023-01-01 DIAGNOSIS — G44.211 INTRACTABLE EPISODIC TENSION-TYPE HEADACHE: ICD-10-CM

## 2023-01-01 DIAGNOSIS — G89.29 CHRONIC BACK PAIN, UNSPECIFIED BACK LOCATION, UNSPECIFIED BACK PAIN LATERALITY: Primary | ICD-10-CM

## 2023-01-01 DIAGNOSIS — T78.40XD ALLERGIC REACTION, SUBSEQUENT ENCOUNTER: ICD-10-CM

## 2023-01-01 DIAGNOSIS — R33.9 URINARY RETENTION: ICD-10-CM

## 2023-01-01 LAB
ALBUMIN SERPL BCG-MCNC: 4.9 G/DL (ref 3.5–5.2)
ALBUMIN UR-MCNC: 70 MG/DL
ALP SERPL-CCNC: 86 U/L (ref 35–104)
ALT SERPL W P-5'-P-CCNC: 22 U/L (ref 10–35)
ANION GAP SERPL CALCULATED.3IONS-SCNC: 12 MMOL/L (ref 7–15)
APPEARANCE UR: CLEAR
AST SERPL W P-5'-P-CCNC: 25 U/L (ref 10–35)
BILIRUB DIRECT SERPL-MCNC: <0.2 MG/DL (ref 0–0.3)
BILIRUB SERPL-MCNC: 0.4 MG/DL
BILIRUB UR QL STRIP: NEGATIVE
BUN SERPL-MCNC: 11.7 MG/DL (ref 8–23)
CALCIUM SERPL-MCNC: 9.8 MG/DL (ref 8.8–10.2)
CHLORIDE SERPL-SCNC: 90 MMOL/L (ref 98–107)
COLOR UR AUTO: COLORLESS
CREAT SERPL-MCNC: 0.54 MG/DL (ref 0.51–0.95)
DEPRECATED HCO3 PLAS-SCNC: 24 MMOL/L (ref 22–29)
ERYTHROCYTE [DISTWIDTH] IN BLOOD BY AUTOMATED COUNT: 13 % (ref 10–15)
GFR SERPL CREATININE-BSD FRML MDRD: >90 ML/MIN/1.73M2
GLUCOSE SERPL-MCNC: 116 MG/DL (ref 70–99)
GLUCOSE UR STRIP-MCNC: NEGATIVE MG/DL
HCT VFR BLD AUTO: 42 % (ref 35–47)
HGB BLD-MCNC: 13.9 G/DL (ref 11.7–15.7)
HGB UR QL STRIP: NEGATIVE
KETONES UR STRIP-MCNC: NEGATIVE MG/DL
LEUKOCYTE ESTERASE UR QL STRIP: NEGATIVE
LIPASE SERPL-CCNC: 50 U/L (ref 13–60)
MCH RBC QN AUTO: 29.7 PG (ref 26.5–33)
MCHC RBC AUTO-ENTMCNC: 33.1 G/DL (ref 31.5–36.5)
MCV RBC AUTO: 90 FL (ref 78–100)
NITRATE UR QL: NEGATIVE
PH UR STRIP: 7 [PH] (ref 5–7)
PLATELET # BLD AUTO: 319 10E3/UL (ref 150–450)
POTASSIUM SERPL-SCNC: 3.9 MMOL/L (ref 3.4–5.3)
PROT SERPL-MCNC: 8.3 G/DL (ref 6.4–8.3)
RBC # BLD AUTO: 4.68 10E6/UL (ref 3.8–5.2)
RBC URINE: 1 /HPF
SODIUM SERPL-SCNC: 126 MMOL/L (ref 136–145)
SP GR UR STRIP: 1.01 (ref 1–1.03)
SQUAMOUS EPITHELIAL: <1 /HPF
UROBILINOGEN UR STRIP-MCNC: <2 MG/DL
WBC # BLD AUTO: 8.6 10E3/UL (ref 4–11)
WBC URINE: <1 /HPF

## 2023-01-01 PROCEDURE — 99215 OFFICE O/P EST HI 40 MIN: CPT | Mod: VID | Performed by: ANESTHESIOLOGY

## 2023-01-01 PROCEDURE — 81001 URINALYSIS AUTO W/SCOPE: CPT | Performed by: EMERGENCY MEDICINE

## 2023-01-01 PROCEDURE — 96361 HYDRATE IV INFUSION ADD-ON: CPT

## 2023-01-01 PROCEDURE — 85027 COMPLETE CBC AUTOMATED: CPT | Performed by: EMERGENCY MEDICINE

## 2023-01-01 PROCEDURE — 51701 INSERT BLADDER CATHETER: CPT

## 2023-01-01 PROCEDURE — 99285 EMERGENCY DEPT VISIT HI MDM: CPT | Mod: 25

## 2023-01-01 PROCEDURE — 96375 TX/PRO/DX INJ NEW DRUG ADDON: CPT

## 2023-01-01 PROCEDURE — 99205 OFFICE O/P NEW HI 60 MIN: CPT | Performed by: ANESTHESIOLOGY

## 2023-01-01 PROCEDURE — 250N000011 HC RX IP 250 OP 636: Performed by: EMERGENCY MEDICINE

## 2023-01-01 PROCEDURE — 83690 ASSAY OF LIPASE: CPT | Performed by: EMERGENCY MEDICINE

## 2023-01-01 PROCEDURE — 80053 COMPREHEN METABOLIC PANEL: CPT | Performed by: EMERGENCY MEDICINE

## 2023-01-01 PROCEDURE — 82248 BILIRUBIN DIRECT: CPT | Performed by: EMERGENCY MEDICINE

## 2023-01-01 PROCEDURE — 99417 PROLNG OP E/M EACH 15 MIN: CPT | Performed by: ANESTHESIOLOGY

## 2023-01-01 PROCEDURE — 258N000003 HC RX IP 258 OP 636: Performed by: EMERGENCY MEDICINE

## 2023-01-01 PROCEDURE — 96374 THER/PROPH/DIAG INJ IV PUSH: CPT

## 2023-01-01 PROCEDURE — 51798 US URINE CAPACITY MEASURE: CPT

## 2023-01-01 PROCEDURE — 36415 COLL VENOUS BLD VENIPUNCTURE: CPT | Performed by: EMERGENCY MEDICINE

## 2023-01-01 RX ORDER — HYDROMORPHONE HYDROCHLORIDE 1 MG/ML
0.5 INJECTION, SOLUTION INTRAMUSCULAR; INTRAVENOUS; SUBCUTANEOUS ONCE
Status: DISCONTINUED | OUTPATIENT
Start: 2023-01-01 | End: 2023-01-01

## 2023-01-01 RX ORDER — HYDROMORPHONE HYDROCHLORIDE 1 MG/ML
0.5 INJECTION, SOLUTION INTRAMUSCULAR; INTRAVENOUS; SUBCUTANEOUS ONCE
Status: COMPLETED | OUTPATIENT
Start: 2023-01-01 | End: 2023-01-01

## 2023-01-01 RX ORDER — HYDROMORPHONE HYDROCHLORIDE 2 MG/1
TABLET ORAL
COMMUNITY
Start: 2023-01-01 | End: 2023-01-01

## 2023-01-01 RX ORDER — BUPRENORPHINE 2 MG/1
1 TABLET SUBLINGUAL 2 TIMES DAILY
Qty: 30 TABLET | Refills: 0 | Status: SHIPPED | OUTPATIENT
Start: 2023-01-01 | End: 2023-01-01

## 2023-01-01 RX ORDER — ONDANSETRON 2 MG/ML
4 INJECTION INTRAMUSCULAR; INTRAVENOUS ONCE
Status: COMPLETED | OUTPATIENT
Start: 2023-01-01 | End: 2023-01-01

## 2023-01-01 RX ORDER — PROMETHAZINE HYDROCHLORIDE 25 MG/1
25 TABLET ORAL EVERY 6 HOURS PRN
Qty: 10 TABLET | Refills: 0 | Status: SHIPPED | OUTPATIENT
Start: 2023-01-01

## 2023-01-01 RX ORDER — CELECOXIB 200 MG/1
200 CAPSULE ORAL 2 TIMES DAILY
Qty: 60 CAPSULE | Refills: 1 | Status: SHIPPED | OUTPATIENT
Start: 2023-01-01

## 2023-01-01 RX ADMIN — PROMETHAZINE HYDROCHLORIDE 25 MG: 25 INJECTION INTRAMUSCULAR; INTRAVENOUS at 21:14

## 2023-01-01 RX ADMIN — SODIUM CHLORIDE 1000 ML: 9 INJECTION, SOLUTION INTRAVENOUS at 21:14

## 2023-01-01 RX ADMIN — ONDANSETRON 4 MG: 2 INJECTION INTRAMUSCULAR; INTRAVENOUS at 20:08

## 2023-01-01 RX ADMIN — HYDROMORPHONE HYDROCHLORIDE 0.5 MG: 1 INJECTION, SOLUTION INTRAMUSCULAR; INTRAVENOUS; SUBCUTANEOUS at 21:24

## 2023-01-01 ASSESSMENT — ACTIVITIES OF DAILY LIVING (ADL)
ADLS_ACUITY_SCORE: 33
ADLS_ACUITY_SCORE: 35

## 2023-01-01 ASSESSMENT — PAIN SCALES - GENERAL
PAINLEVEL: SEVERE PAIN (7)
PAINLEVEL: WORST PAIN (10)
PAINLEVEL: MILD PAIN (3)

## 2023-01-06 NOTE — TELEPHONE ENCOUNTER
Patient cancelled appt for 1-23-23via my-chart. Writer called to determine if a reschedule was required. Patient currently out of state and will call back in the future if appointment is needed upon return to [place].

## 2023-01-23 NOTE — TELEPHONE ENCOUNTER
[facility]    Phone Message    May a detailed message be left on voicemail: yes     Reason for Call: Other: Pt's daughter Naila is calling because she would like to speak to Leonora about her mom. Naila didn't go into detail but she would like a call back from Leonora at her earliest convenience     Action Taken: Message routed to:  Other: [facility]    Travel Screening: Not Applicable

## 2023-01-23 NOTE — TELEPHONE ENCOUNTER
Patient with Alzheimer's dementia when in unfamiliar surroundings tend to get more confused and agitated.  I suspect being in [place] away from her familiar surroundings triggered that episode.  Patients tend to function relatively close to their baseline in the familiar surroundings.  If her mental status improved once she returns to [place] then likely being away from her familiar surroundings is the likely cause for her confusion.  If family wants to declare her incompetent or unable to make decisions for herself they need to talk to a  and I can give a letter with details of her condition.

## 2023-01-23 NOTE — TELEPHONE ENCOUNTER
Called Naila. Her mothers Alzheimers is getting worse. What is the process of declaring her incompetent or unable to make decisions for herself. She was hospitalized for 4 days and she had lost her memory for those days, she didn't recognize her family and thought that her phone was her pill box. She has been wandering off at stores, etc.   They would like to know the next steps.

## 2023-01-26 NOTE — TELEPHONE ENCOUNTER
No consent on file to speak to Naila, only to daughter Lucie.     Asked Lucie to return call to discuss below further. Did leave detailed message informing that Naila called regarding process if creating health care proxy for patient and that I cannot speak to Naila.    Bassem Mcgarry, RN, BSN  [facility]

## 2023-01-27 NOTE — TELEPHONE ENCOUNTER
RN returned call to Lucie. We discussed patients recent memory decline. Family has no current concerns for patients safety at home- she is not behavioral or exhibiting any unsafe behaviors like wandering per daughters observation. Family is almost always home with patient.     Discussed physical and social factors- recent hospitalization, trip to [place], and return to home. Family aware that frequent changes in environment and routine can contribute to decline in cognition/memory. Pt  Also grieving loss of spouse 5 months ago, was in a caregiver role and has lost that sense of purpose. Pt also struggles with chronic neck pain.     They will continue to monitor patients cognition, have decided not to start namenda yet because of other medication changes made recently by PCP. Pt has pain appt for neck, will see grief counselor, and is starting PT/OT for ongoing weakness since fall.     Family will follow up via phone call or SkuRun message as needed. Next appt 6/2023.     Bassem Mcgarry RN, BSN  [facility]

## 2023-02-06 NOTE — TELEPHONE ENCOUNTER
[facility] Call Center    Phone Message    May a detailed message be left on voicemail: yes     Reason for Call: Other: Pt daughter is calling and wanting to make sure that the Pt records have been all transfered over before Pt appt. Please call Daughter back to confirm that this is all done.      Action Taken: Message routed to:  Other: [facility]    Travel Screening: Not Applicable

## 2023-02-06 NOTE — TELEPHONE ENCOUNTER
Call placed to daughter (C2C on file) they wanted to make sure that all the records have been sent/recevied since they are coming from multiple places.     Pt is having records sent from:    [facility]  [facility] in [place], [place]      Advised pt/Daughter that all records appear to have been received or loaded into Care Everywhere. Will have Dr Lizarraga review on return and can advise if we are missing any pertinent documentation pieces ahead of appt 2/22    Will reach out to pt and daughter if additional records are needed.    Josie PINON RN Care Coordinator  [facility]

## 2023-02-24 NOTE — PROGRESS NOTES
[facility] Consultation    Date of visit: 2/27/2023    Reason for consultation:    Leonardo De La Rosa is a 77 year old female who is seen in consultation today at the request of her primary care physician, Cassie Ortega.     Consultation and Evaluation for: chronic pain syndrome    Review of [place] Prescription Monitoring Program (): Today I have also reviewed the patient's history of controlled substance use, as provided by [place] licensed pharmacies and prescriber dispensers. YES  DILAUDID 2MG #100 1/26/2023  LYRICA 75MG #180 1/25/2023  AMBIEN 5MG #30 11/11/2022  METHADONE 5MG #30 11/11/2022    Review of Pain Questionnaire: Please see the [facility] health questionnaire, which the patient completed and reviewed with me in detail.    Review of Electronic Chart: Today I have also reviewed available medical information in the patient's medical record at [facility] ([facility]), including relevant provider notes, laboratory work, and imaging.       Chief Complaint:    Chief Complaint   Patient presents with     Pain       Pain history:  Leonardo De La Rosa is a 77 year old female who presents for initial evaluation of the following chronic pain complaints.     - The patient is here with her daughter,Lucie, today for an initial pain consult for right-sided neck pain.  Her daughter provides most of the history and is the medical proxy for the patient.  - Her right-sided neck pain began about a year and a half ago and has increased in severity since that time.  - It is localized on the right side of the back of her neck and radiates behind her ear and into her sinuses on that side.  -She has seen many different orthopedic neurologists and pain providers in the past.  Most recently she went to a pain provider in [place] while out there on vacation because the pain was so severe.  At that time he performed a right-sided C2-3 radiofrequency ablation  "that only serve to increase her pain.  - She has had a couple cervical 1/2 facet joint injections done by [facility] here in the [facility].  That is the only injection that has been somewhat helpful.  Her most recent one was a year ago in 2022.  - Her  passed away from liver failure about 6 months ago and this has been hard on her and the family.  She continues to grieve.  -There has also been some concern for ongoing dementia and she is being evaluated by neurology for this.  She was accidentally taking her 's dementia medication for a time and the family feels that this was actually helpful.  They are interested in seeing a specialist and having her put back on this.  - Her daughter also reports that there has been many falls in the last 6 months.  The most severe of these falls resulted in a wrist fracture.  None of these falls have happened at night.  - She tried BOTOX at [facility] in July for muscle spasms and it was not helpful.   - Grief is not helping with her pain  - She may be overusing her pain medications at home to manage.  Her family is somewhat concerned about her medication use.   - She has been using Dilaudid on a daily basis for almost 2 years now.  She is currently taking Dilaudid 2 mg 4 times a day.  The patient reports that this is very helpful and she \"does not know what she would do without it \"  - She does not want to use a walker or cane.   - She is currently in the process of getting dentures and is only eating soup. She has lost about 5 lbs. She is supplimenting her diet with ensure  - She started at [facility] and had trigger point injection's and cervical epidurals that were not helpful.   - Then went to [facility] and then was referred for C1-2 injections   - [place].  They are longtime family friend's of a colleague of mine in [place].  -The family has certified the patient in the past for medical cannabis but that has .  The patient is " interested in reexploring this.  -The patient has a therapist that she sees on a monthly basis.  - Neurologist is currently Dr. Terry and she has an appointment coming up with [facility] neurology.   -The family is interested in restarting memantine.  This is a medication that her  was on and she accidentally took for period of time.  They felt it was helpful for her dementia.    Red Flags: The patient denies bowel or bladder incontinence, parasthesias, weakness, saddle anesthesia, unintentional weight loss, or fever/chills/sweats.     Social History:  Home situation: The patient currently lives in [place] with her oldest daughter.  Occupation/Schooling: She is retired and no longer working  Tobacco use: denies  Drug use: none  Alcohol use: none  History of chemical dependency treatment: not asked  Mental health admissions: none      PEG: A Three-Item Scale Assessing Pain Intensity and Interference    What number best describes your PAIN ON AVERAGE in the past week?  10    What number best describes how, during the past week, pain has interfered with your ENJOYMENT OF LIFE?  10    What number best describes how, during the past week, pain has interfered with your GENERAL ACTIVITY?  10    PEG Total Score:  10    Fernando EE, Timothy KA, Manny MJ, Marisol TA, Adalberto J, Kathy JM, Kyle SM, Ryan K. Development and initial validation of the PEG, a 3-item scale assessing pain intensity and interference. Journal of General Internal Medicine. 2009 Rainer;24:733-738.      PAIN QUESTIONAIRE:  Not filled out      PAIN IMAGE:   Not submitted by the patient     MEDICATIONS FOR PAIN:   Tylenol PRN   CYMBALTA 60MG DAILY   DILAUDID 2MG 4-6HRS PRN  LYRICA 75MG BID  CELEBREX 100MG BID  ROBAXIN 500mg TID    Topicals - diclofenac (Voltaren gel), lidocaine (Lidoderm) and CBD      INJECTIONS:   RIGHT C1-2 facet joint injection 2/2022 [facility]  botox  trigger point injection's   Right C2-3 RFA  DILLAN    SURGICAL HISTORY:   No  cervical spine history    IMAGING:  MRI LUMBAR SPINE 5/23/2022  1.  Postoperative change of L5 through sacral fusion and laminectomy.  2.  Multilevel degenerative change.  3.  No high-grade stenoses.    CTA HEAD AND NECK 12/5/2022  HEAD CT:   1.  No CT evidence for acute intracranial process.   2.  Minor right frontal scalp contusion without associated fracture.   3.  Brain atrophy and presumed chronic microvascular ischemic changes as above.      HEAD CTA:   1.  No significant stenosis, aneurysm, or high flow vascular malformation identified.      NECK CTA:   1.  No hemodynamically significant stenosis in the neck vessels by NASCET criteria.   2.  No evidence for dissection.     CT FACIAL BONE 12/5/2022  1.  No acute maxillofacial fracture or malalignment.   2.  Suggestion of mild right periorbital and premalar soft tissue swelling.    MRI CERVICAL SPINE 12/31/2021        CT CERVICAL SPINE 11/3/2022:       EMG RESULTS:     EMG 5/31/2022  This is a normal nerve conduction and EMG study of bilateral lower extremities     EMG 7/13/2021  1.  This is a normal electrodiagnostic study of bilateral lower extremities  2.  Specifically there is no electrodiagnostic evidence of a lumbar radiculopathy, plexopathy or peripheral apathy    Labs:   NEGATIVE rheumatology workup     Past Medical History:  Past Medical History:   Diagnosis Date     Arthritis     Feet Hands and back.     PONV (postoperative nausea and vomiting)     Severe     Postsurgical arthrodesis status 4/23/2013     Status post lumbar spinal fusion 2/24/2020       Past Surgical History:  Past Surgical History:   Procedure Laterality Date     ARTHRODESIS FOOT  4/22/2013    Procedure: ARTHRODESIS FOOT;  Left Second and Third Tarsal metatarsal Arthrodesis, Bunion Correction, Lapidus Procedure, Weil Osteotomy   ;  Surgeon: lAber Pace MD;  Location: [facility] OR     ARTHROPLASTY KNEE Right 1/18/2022    Procedure: RIGHT TOTAL KNEE ARTHROPLASTY;  Surgeon:  Yves Li MD;  Location: [facility] OR     BUNIONECTOMY LAPIDUS WITH TARSAL METATARSAL (TMT) FUSION  4/22/2013    Procedure: BUNIONECTOMY LAPIDUS WITH TARSAL METATARSAL (TMT) FUSION;;  Surgeon: Alber Pace MD;  Location: [facility] OR     FOOT SURGERY      right bunionectomy.     FOOT SURGERY       HYSTERECTOMY       HYSTERECTOMY TOTAL ABDOMINAL, BILATERAL SALPINGO-OOPHORECTOMY, COMBINED       KNEE SURGERY       ZZC TOTAL KNEE ARTHROPLASTY      left       Medications:  Current Outpatient Medications   Medication Sig Dispense Refill     acetaminophen (TYLENOL) 325 MG tablet Take 2 tablets (650 mg) by mouth every 4 hours as needed for other (mild pain) 100 tablet 0     ammonium lactate (AMLACTIN) 12 % external cream Apply topically daily as needed for dry skin       azelastine (ASTELIN) 0.1 % nasal spray Spray 1 spray into both nostrils every morning       calcium carbonate-vitamin D 600-200 MG-UNIT TABS Take 1 tablet by mouth daily.       donepezil (ARICEPT) 10 MG tablet Take 1 tablet (10 mg) by mouth At Bedtime 90 tablet 3     DULoxetine (CYMBALTA) 60 MG capsule Take 60 mg by mouth daily        famotidine (PEPCID) 40 MG tablet Take 20 mg by mouth every evening        ferrous sulfate (FEROSUL) 325 (65 Fe) MG tablet Take 325 mg by mouth every morning       fluocin-hydroquinone-tretinoin (TRI-RODNEY) 0.01-4-0.05 % CREA Externally apply topically At Bedtime 30 g 3     fluticasone (FLONASE) 50 MCG/ACT nasal spray INSTILL 1 SPRAY INTO EACH NOSTRIL ONCE DAILY AS NEEDED FOR RHINITIS       folic acid (FOLVITE) 1 MG tablet Take 1 tablet (1 mg) by mouth daily 90 tablet 3     folic acid (FOLVITE) 1 MG tablet Take 1 tablet (1 mg) by mouth daily 90 tablet 3     glucosamine-chondroitin 500-400 MG CAPS Take 1 capsule by mouth daily.       levocetirizine (XYZAL) 5 MG tablet Take 5 mg by mouth every morning       losartan (COZAAR) 100 MG tablet Take 100 mg by mouth daily        memantine (NAMENDA) 10 MG tablet Take 1 tablet (10  "mg) by mouth 2 times daily 60 tablet 11     methocarbamol (ROBAXIN) 500 MG tablet TAKE 1 TABLET BY MOUTH 3 TIMES A DAY       methotrexate 2.5 MG tablet Take 2 tablets beginning week 1, if tolerated, increase to 6 tablets on week 2 and continue that dose weekly until seen.  Labs every 3 months for refills. 72 tablet 1     multivitamin w/minerals (THERA-VIT-M) tablet Take 1 tablet by mouth daily.       Omega-3 Fatty Acids (OMEGA-3 FISH OIL) 1200 MG CAPS Take 1,200 g by mouth every 7 days on Friday       omeprazole (PRILOSEC) 20 MG DR capsule Take 20 mg by mouth every morning        pravastatin (PRAVACHOL) 20 MG tablet Take 20 mg by mouth daily        pregabalin (LYRICA) 75 MG capsule Take 75 mg by mouth 2 times daily Patient taking 50 mg in am and 75 mg in pm       senna-docusate (SENOKOT-S/PERICOLACE) 8.6-50 MG tablet Take 1-2 tablets by mouth 2 times daily Take while on oral narcotics to prevent or treat constipation. 30 tablet 0     Vitamin D3 (CHOLECALCIFEROL) 25 mcg (1000 units) tablet Take 1,000 Units by mouth daily        zolpidem (AMBIEN) 5 MG tablet Take 5 mg by mouth At Bedtime          Allergies:     Allergies   Allergen Reactions     Estratest H.S. Other (See Comments) and Unknown     Other reaction(s): Other (see comments)  Increase lipids  Outside source did not list reaction  Increase lipids       Hydrocodone      Oxycodone      Propofol Nausea and Vomiting     Outside source states \"injectable and inhaled\"  Outside source states \"injectable and inhaled\"  Outside source states \"injectable and inhaled\"  Outside source states \"injectable and inhaled\"       Reglan      Estrogens Rash     Other reaction(s): Other (see comments)  Outside source did not list reaction  Estrogen patches, from patch only, ( Adhesive )  does tolerate estrogen other forms        Penicillins Nausea and Vomiting and Nausea     Other reaction(s): GI intolerance, GI Upset  Patient states upset stomach.  Patient states upset " stomach.  Patient states upset stomach.  Patient states upset stomach.         Family history:  Family History   Problem Relation Age of Onset     Coronary Artery Disease Mother        Physical Exam:  Vitals:    02/27/23 1347   BP: 134/80   Pulse: 88   SpO2: 99%       GENERAL: Healthy, alert and no distress  EYES: Eyes grossly normal to inspection.  No discharge or erythema, or obvious scleral/conjunctival abnormalities.  RESP: No audible wheeze, cough, or visible cyanosis.  No visible retractions or increased work of breathing.    SKIN: Visible skin clear. No significant rash, abnormal pigmentation or lesions.  NEURO: Cranial nerves grossly intact.  Mentation and speech appropriate for age.  PSYCH: Mentation appears normal, affect normal/bright, judgement and insight intact, normal speech and appearance well-groomed.        ASSESSMENT/PLAN:  The following recommendations were given to the patient. Diagnosis, treatment options, risks, benefits, and alternatives were discussed, and all questions were answered. The patient expressed understanding of the plan for management.     1. Chronic pain syndrome    The patient has a approximately 18-month history of severe right-sided neck pain that is isolated to the area just posterior to her right ear.  This has been worked up by many physicians including neurologist pain management and orthopedic physicians.  She does have a area of severe facet joint arthritis at C1-2 on the right side on her cervical MRI that correlates with this.  Previous injections including Botox trigger point injections cervical epidurals and an RFA have not been helpful.  The only injection that was somewhat helpful was a right C1/2 facet joint injection that was done at [facility] about a year ago.    Mental Health - the patient's mental health concerns, specifically her grief and depression surrounding her 's death 6 months ago, affect her experience of pain and contribute to her clinically  significant distress.    Could consider pain psychology in the future.  A clinical Health Pain Psychologist can help reduce physical and psychosocial triggers or reinforcers of pain by adapting thoughts, feelings and behaviors to reduce symptoms and increase quality of life.  Evidence indicates that the practice of relaxation, meditation, and mindfulness techniques can significantly affect pain levels and overall well being.    She has been taking Dilaudid 2 mg 4 times a day for a couple years now.  This was most recently being prescribed by her primary care physician.  There is some concern for overuse by the patient's family members.  We spent a great deal of time today discussing the use of chronic opioids for chronic pain and why I do not think they are indicated.  Specifically we talked about the development of tolerance over time, opioid induced hyperalgesia, sedation and cognitive impairment, sleep disordered breathing and risk of unintentional overdose and death.  I am recommending a medication called buprenorphine and this was discussed with the patient.  I did explain that the patient would need to wait 12 hours after the last dose of Dilaudid prior to starting this.  I will follow-up with the patient and the daughter in 1 week to discuss dosing changes as well as possible side effects.  I did explain that it does take 3 to 6 months for the body to heal from chronic long-term opioid use and for opiate-induced hyperalgesia to significantly improved.    UDS and contract done -this will need to be done at her next follow-up as the visit took so long that this was overlooked unfortunately today.    - DISCONTINUE ROBAXIN I do not generally recommend muscle relaxants for patients over the age of 60 and this could be contributing to her falls.  In addition I do not believe her pain has a myofascial etiology and this medication therefore should not be useful for her pain.    - Buprenorphine HCl (BELBUCA) 75 MCG  FILM buccal film; Place 1 Film (75 mcg) inside cheek every 12 hours  Dispense: 60 Film; Refill: 0    -Medical cannabis certification was done after today's appointment.  The family was instructed that they would need to pay the yearly fee and complete the forms that are emailed to them.  They are familiar with this as they have done in the past.    2. Other spondylosis, cervical region    All imaging was reviewed as were outside records.     I placed an external referral and faxed it to [facility].  I am recommending that we repeat a right C1-2 facet joint injection.    - PAIN INJECTION EVAL/TREAT/FOLLOW UP; Future    3. Intractable episodic tension-type headache    - Pain Management Referral        MEDICATIONS:     Orders Placed This Encounter   Medications     HYDROmorphone (DILAUDID) 2 MG tablet     Sig: One po q 4-6 hours prn, do not take at the same time as methadone Use dates:11/11/22-12/10/22     Buprenorphine HCl (BELBUCA) 75 MCG FILM buccal film     Sig: Place 1 Film (75 mcg) inside cheek every 12 hours     Dispense:  60 Film     Refill:  0          - Continue other medications without change           FOLLOW UP: ONE WEEK -an appointment was scheduled for 3/7/2023 @ 1:30PM THIS WILL BE A VIDEO VISIT      BILLING TIME DOCUMENTATION:   The total TIME spent on this patient on the date of the encounter/appointment was 98 minutes.      TOTAL TIME includes:   Time spent preparing to see the patient (reviewing records and tests) - 10 min  Time spent face to face (or over the phone) with the patient - 63 min  Time spent ordering tests, medications, procedures and referrals - 10 min  Time spent Referring and communicating with other healthcare professionals - 0 min  Time spent documenting clinical information in Epic - 15 min       SALLY CROWLEY MD   Pain Management

## 2023-02-27 NOTE — PATIENT INSTRUCTIONS
Discontinue Robaxin -you do not need to taper off of this.  It can just be discontinued.    Discontinue Dilaudid and wait 12 hours to start the Belbuca 75mcg.  Please discuss how to use Belbuca with the pharmacist when you  the prescription.  I will put in an external referral [facility] for a repeat RIGHT C1-2 injection.  Medical cannabis certification was done today.   A follow-up video visit has been scheduled for March 7 at 1:30 PM.  We can discuss dosing changes of the buprenorphine as well as possible side effects at that time.    Celina Lizarraga MD     ----------------------------------------------------------------  Clinic Number:  [phone]   Call with any questions about your care and for scheduling assistance.   Calls are returned Monday through Friday between 8 AM and 4:30 PM. We usually get back to you within 2 business days depending on the issue/request.    If we are prescribing your medications:  For opioid medication refills, call the clinic or send a combionic message 7 days in advance.  Please include:  Name of requested medication  Name of the pharmacy.  For non-opioid medications, call your pharmacy directly to request a refill. Please allow 3-4 days to be processed.   Per [place] State Law:  All controlled substance prescriptions must be filled within 30 days of being written.    For those controlled substances allowing refills, pickup must occur within 30 days of last fill.      We believe regular attendance is key to your success in our program!    Any time you are unable to keep your appointment we ask that you call us at least 24 hours in advance to cancel.This will allow us to offer the appointment time to another patient.   Multiple missed appointments may lead to dismissal from the clinic.

## 2023-02-28 NOTE — TELEPHONE ENCOUNTER
Routing to PA team to initiate PA for Belbuca films    Cassia BORJAS, RN Care Coordinator  [facility]  Pain Management

## 2023-02-28 NOTE — TELEPHONE ENCOUNTER
Called Sol. Confirmed PA needed. Started request for PA in separate encounter.       ----------------Mychart Below from pt----------------  This message is being sent by Lucie De La Rosa on behalf of Leonardo De La Rosa.     Hi ,     We need a P.A. for the medicine, can you please advise when this can be done?     Thanks,  Lucie     --------------Mychart below response to pt----------------  Hello,     Thanks for reaching out. We will go ahead and send a request to have ou prior authorization team work on that for you.     Cassia BORJAS, RN Care Coordinator  [facility]  Pain Management      Cassia BORJAS, RN Care Coordinator  [facility]  Pain Management

## 2023-03-01 NOTE — TELEPHONE ENCOUNTER
Called and clarified order    Josie PINON RN Care Coordinator  [facility]

## 2023-03-01 NOTE — TELEPHONE ENCOUNTER
[facility] Call Center    Phone Message    May a detailed message be left on voicemail: yes     Reason for Call: Other: Ibis from [facility] is calling and needs clarification  looking for a facet joint vs fecet nerve injection. Please call back.      Action Taken: Message routed to:  Other: [facility]    Travel Screening: Not Applicable

## 2023-03-01 NOTE — TELEPHONE ENCOUNTER
Received call from patient requesting refill(s) of Buprenorphine HCl (BELBUCA) 75 MCG FILM buccal film     Last dispensed from pharmacy on 02/28/23    Patient's last office/virtual visit by prescribing provider on 02/27/23  Next office/virtual appointment scheduled for 03/07/23    Last urine drug screen date None  Current opioid agreement on file (completed within the last year) No Date of opioid agreement: None    E-prescribe to   [id number] - [place] [address] [address] [address] [place] [address] [address]  [facility] [id number]  Phone: [phone] Fax: [phone]    Will route to nursing [place] for review and preparation of prescription(s).       Rosemary Vega MA  [facility]

## 2023-03-01 NOTE — TELEPHONE ENCOUNTER
Will keep encounter open at this time for pt communication and nursing response.    Racheal PINON RN Care Coordinator  [facility]      Medicine PA  2/28/2023 4:46 PM Reply    To: PAIN NURSE      From: Lucie De La Rosa on behalf of Kurt De La Rosa      Created: 2/28/2023 4:46 PM        *-*-*This message was handled on 3/1/2023 8:28 AM by RACHEAL PALM*-*-*    This message is being sent by Lucie De La Rosa on behalf of Leonardo De La Rosa.     Thanks, do you know if the order has been sent in to [facility]?        ---------------      Medicine PA  3/1/2023 8:28 AM     To: Kurt De La Rosa      From: Racheal Palm RN      Created: 3/1/2023 8:28 AM        Lucie,     Yes, the order was faxed to [facility] yesterday morning. Let us know if you have not heard from them by next week. Otherwise, we will reach out when we hear news on the PA.     Thanks much,  Racheal PINON RN Care Coordinator  [facility]     When responding to this message, please allow 24-48 business hours for a reply.  If you need sooner assistance please call: [facility] at [phone] between the hours of 8:00AM and 4:30PM Monday through Friday.     Note that [employer] is not intended for emergencies, detailed provider communication, or in lieu of an office visit. Medication is not typically adjusted over MyChart communication.               Restore  Close  Cancel

## 2023-03-02 NOTE — TELEPHONE ENCOUNTER
Central Prior Authorization Team   Phone: [phone]    PA Initiation    Medication:   Insurance Company: [employer] - Phone [phone] Fax [phone]  Pharmacy Filling the Rx: [id number] - [place], [place] - [address] [place] [address] E AT [facility] [address] [facility]  Filling Pharmacy Phone: [phone]  Filling Pharmacy Fax:    Start Date: 3/2/2023

## 2023-03-02 NOTE — TELEPHONE ENCOUNTER
PRIOR AUTHORIZATION DENIED    Medication: Belbuca 75MCG films      Denial Date: 3/2/2023    Denial Rational:  Insurance requiring patient to try/fail Tramadol extended release which also may need a prior authorization.  Insurance was made aware that patient has already tried/failed tramadol IR and provider wants patient to stay away from opioids due to possible overuse/abuse but Belbuca was still denied.         Appeal Information: This medication was denied. If physician would like to appeal because patient has contraindication or allergy to covered medication please write letter of medical necessity and route back to PA team to initiate.  If no further action is needed please close encounter thank you.

## 2023-03-02 NOTE — TELEPHONE ENCOUNTER
Will leave encounter open for patient response/chart review by nursing.       ----------------Mychart Below from pt----------------  This message is being sent by Lucie De La Rosa on behalf of Leonardo De La Rosa.     Thanks, is it possible to state that the shot is medically necessary and sent to [facility]? I had explained to  that we had a problem with insurance/[facility] last time. When I spoke to [facility] to schedule, they suggested to get this from the Dr. so they had it when submitting to insurance to avoid possible denial.     Also, what is  s opinion about getting an injection so far up in the neck? Is there a risk of a possible stroke? We have heard this before, so just wanted her opinion.    --------------Mychart below response to pt----------------  Hello,     Unfortunately, while injections can provide beneficial pain relief, they are not actually medically necessary they would be considered elective.  As this is not an injection Dr Lizarraga specifically performs, she recommends that you discuss risks directly with [facility].     Cassia BORJAS, RN Care Coordinator  [facility]  Pain Management    When responding to this message, please allow 24-48 business hours for a reply.  If you need sooner assistance please call: [facility] at [phone] between the hours of 8:00AM and 4:30PM Monday through Friday.    Note that [employer] is not intended for emergencies, detailed provider communication, or in lieu of an office visit. Medication is not typically adjusted over MyChart communication.

## 2023-03-02 NOTE — TELEPHONE ENCOUNTER
Re-routing for PA initiation of:    Buprenorphine HCl (BELBUCA) 75 MCG FILM buccal film 60 Film 0 2/27/2023      Josie PINON RN Care Coordinator  [facility]

## 2023-03-02 NOTE — TELEPHONE ENCOUNTER
I called and resubmitted this for approval.  They stated it could take 72 hours even though it is marked urgent.     Celina Lizarraga MD

## 2023-03-03 NOTE — TELEPHONE ENCOUNTER
Per patient myChart message:  From: Lucie De La Rosa on behalf of Kurt De La Rosa      Created: 3/2/2023 1:04 PM      This message is being sent by Lucie De La Rosa on behalf of Leonardo De La Rosa.     Thank you, can you please advise how long does it take for the PA to get processed for the medication?      _______________    Per the 2/28/23 PA encounter, on 3/2/23 Dr Burton called and resubmitted; it was marked as urgent meaning it should be addressed w/in 72 hours.  _________________________________    Mychart sent to pt:  From: Jacklyn Fuentes RN      Created: 3/3/2023 8:58 AM      Efrem Hicks,  The goal is we will hear back from your insurance regarding coverage for the [facility] sometime early next week.       Kind regards,     PERRI Villasenor-BSN  [facility]

## 2023-03-03 NOTE — TELEPHONE ENCOUNTER
See the 2/28/23 encounter.  The prior authorization for the belbuca is still pending.  Dr. Lizarraga resent some information to insurance.    Jacklyn RN-BSN  [facility]

## 2023-03-03 NOTE — TELEPHONE ENCOUNTER
[facility] Call Center    Phone Message    May a detailed message be left on voicemail: yes     Reason for Call: Other: Lena meredith clinical pharmacist with [employer] called wanting to discuss formulary alternative Tramadol ER. She is requesting call back at [phone].    Action Taken: Message routed to:  Other: Pain    Travel Screening: Not Applicable

## 2023-03-06 NOTE — TELEPHONE ENCOUNTER
Please call the patient and let her know that her belbuca was denied after an appeal was made by myself last Friday afternoon.      I am recommending a different formulation of the same medication.  This will likely also be denied by insurance and will need to be paid for out of pocket.  However, it is affordable.     She will need to break the tablet in half and take 1/2 tablet UNDER the tongue upon waking and around 4pm.     Script Eprescribed to pharmacy  [place] Prescription Monitoring Program checked    Signed Prescriptions:                        Disp   Refills    buprenorphine (SUBUTEX) 2 MG SUBL sublingu*30 tab*0        Sig: Place 0.5 tablets (1 mg) under the tongue 2 times           daily  Authorizing Provider: SALLY CROWLEY MD

## 2023-03-06 NOTE — TELEPHONE ENCOUNTER
This concern is ongoing in separate encounter. Closing to prevent duplication    Josie PINON RN Care Coordinator  [facility]

## 2023-03-06 NOTE — TELEPHONE ENCOUNTER
Addressed in TE    Closing    Josie PINON RN Care Coordinator  [facility]

## 2023-03-07 NOTE — TELEPHONE ENCOUNTER
[facility] Call Center    Phone Message    May a detailed message be left on voicemail: yes     Reason for Call: Other:  Patient's daughter Lucie called stating patient seems to be having negative side effects from the medication. She states patient patient has been throwing up for an hour and half. Writer transferring to red flag triage.    Action Taken: Message routed to:  Other: Pain    Travel Screening: Not Applicable

## 2023-03-07 NOTE — TELEPHONE ENCOUNTER
Did she stop the other opioids for at least 12 hours prior to starting this?      It can cause precipitated withdrawal if she did not.  Does she have a hidden supply?     I have heard of nausea/vomitting with this medication, however, it typically only happens on the first dose.  I would recommend she try another 1mg tomorrow.      If this continues to be a problem we could try to get the patch covered which is lower dose (same medication)     Please put her on my schedule for Thursday afternoon for a video visit.     Celina Lizarraga MD

## 2023-03-07 NOTE — TELEPHONE ENCOUNTER
Routing to provider to review and advise    Medicine PA  3/7/2023 2:46 PM Reply    To: PAIN NURSE      From: Lucie De La Rosa on behalf of Kurt De La Rosa      Created: 3/7/2023 2:46 PM        *-*-*This message has not been handled.*-*-*    This message is being sent by Lucie De La Rosa on behalf of Leonardo De La Rosa.     Efrem Felicianoine,     My mom is not doing well with this medication and has been throwing up with only dose. I don t think this medicine is going to work for her. Can we talk about another option? I did speak with a triage nurse but we got disconnected. I had mention to Dr.Burton Olivera my Mom is extremely sensitive to meds, so we have to be mindful on what is being prescribed to her. Please let me know.        ----------------------------      Medicine PA  3/7/2023 2:57 PM Reply    To: PAIN NURSE      From: Lucie De La Rosa on behalf of Kurt De La Rosa      Created: 3/7/2023 2:57 PM        *-*-*This message has not been handled.*-*-*    This message is being sent by Lucie De La Rosa on behalf of Leonardo De La Rosa.     Also if she has a history from getting sick from codeine and morphine, could it be the reason why she is getting sick from this medicine since it binds from the same receptors?

## 2023-03-07 NOTE — TELEPHONE ENCOUNTER
This concern is being addressed in open myChart encounter and provider is reviewing for reccs to pt and daughter at this time.     Josie PINON RN Care Coordinator  [facility]

## 2023-03-07 NOTE — TELEPHONE ENCOUNTER
"Routing as SHAJI     Spoke with Lucie assured them of below re: n/v with 1st dose    Advised that ALL narcotics need to be removed and pt should no longer be taking as per previous discussion/directions.    Lucie sates \"well this is a sensitive topic so I am not sure. We are just taking her at at word that she has not taken anything\"    Advised on further signs of withdrawal and supportive care for this. Per daughter pt will re-attempt tomorrow with 1mg in divided doses and \"go from there\". Daughter will d/w caregiver on site and send my chart with update in am. Appt for 3/9 scheduled to discuss progress.     Daughter also wanted to update us that she will not be enrolling pt in [facility] at this time as she is concerned about too many med changes at once. And wanted to provide this update ahead of VV.     Josie PINON RN Care Coordinator  [facility]    "

## 2023-03-07 NOTE — TELEPHONE ENCOUNTER
RN was speaking with Lucie and then was disconnected.  RN called and left a voice message that I would try back one more time.  Otherwise, I would route a message to Pain clinic with this call.

## 2023-03-07 NOTE — TELEPHONE ENCOUNTER
Patients daughter just spoke with patient and complaint of ongoing vomiting since starting new medication at 1100 today.  Patient was Rx'd  BELBUCA.  She took her first dose today and has been vomiting ever since.   She is also taking Tylenol and Ibuprofen.  She has not taken her Dilaudid for over 1 week now.  Daughter states that she is extremely sensitive to meds and feels that this is from the new medication.    Recent loss of spouse, so grieving as well.  Recent injection to Cervical facet at [facility] C[facility] region last Friday.  They were to have a virtual with MD today, but pushed it back to see how she did on the new medication.  Not tolerating it at all.   Call was lost to RN,  RN called back to daughter and left a voice message that I would route message to clinic.  RN tried a 2nd time with just voice message. RN did not leave another message.  RN routing to pain clinic    Reason for Disposition    SEVERE vomiting (e.g., 6 or more times/day)  (Exception: Patient sounds well, is drinking liquids, does not sound dehydrated, and vomiting has lasted less than 24 hours.)    Additional Information    Negative: Shock suspected (e.g., cold/pale/clammy skin, too weak to stand, low BP, rapid pulse)    Negative: Difficult to awaken or acting confused (e.g., disoriented, slurred speech)    Negative: Sounds like a life-threatening emergency to the triager    Negative: Vomiting occurs only while coughing    Negative: Pregnant < 20 Weeks and nausea/vomiting began in early pregnancy (i.e., 4-8 weeks pregnant)    Negative: Chest pain    Negative: Headache is main symptom    Negative: Vomiting red blood or black (coffee ground) material    Negative: Vomiting and hernia is more painful or swollen than usual    Negative: Recent head injury (within 3 days)    Negative: Recent abdominal injury (within 7 days)    Negative: Insulin-dependent diabetes and glucose > 240 mg/dL (13 mmol/L)    Negative: Severe pain in one eye    Answer  "Assessment - Initial Assessment Questions  1. VOMITING SEVERITY: \"How many times have you vomited in the past 24 hours?\"      - MILD:  1 - 2 times/day     - MODERATE: 3 - 5 times/day, decreased oral intake without significant weight loss or symptoms of dehydration     - SEVERE: 6 or more times/day, vomits everything or nearly everything, with significant weight loss, symptoms of dehydration     disconnected and unable to reconnect before able to ask this question  2. ONSET: \"When did the vomiting begin?\"       Since 1100 today vomiting after started taking new medication  Belbuca  3. FLUIDS: \"What fluids or food have you vomited up today?\" \"Have you been able to keep any fluids down?\"       disconnected and unable to reconnect before able to ask this question  4. ABDOMINAL PAIN: \"Are your having any abdominal pain?\" If yes : \"How bad is it and what does it feel like?\" (e.g., crampy, dull, intermittent, constant)       disconnected and unable to reconnect before able to ask this question  5. DIARRHEA: \"Is there any diarrhea?\" If Yes, ask: \"How many times today?\"       no  6. CONTACTS: \"Is there anyone else in the family with the same symptoms?\"       7. CAUSE: \"What do you think is causing your vomiting?\"     New medication  Belbuca  8. HYDRATION STATUS: \"Any signs of dehydration?\" (e.g., dry mouth [not only dry lips], too weak to stand) \"When did you last urinate?\"       disconnected and unable to reconnect before able to ask this question  9. OTHER SYMPTOMS: \"Do you have any other symptoms?\" (e.g., fever, headache, vertigo, vomiting blood or coffee grounds, recent head injury)      Off Dilaudid for over a week.  Grief over loss of spouse.  Injection to cervical facet at [facility] last Friday.  10. PREGNANCY: \"Is there any chance you are pregnant?\" \"When was your last menstrual period?\"  n/a    Protocols used: VOMITING-A-OH      "

## 2023-03-07 NOTE — TELEPHONE ENCOUNTER
Spoke with pt and daughter  - the are aware of formulation change to pill/SL tabs and potential OOP cost.    Plan for cassie change to 3/21 to allow for med transition time and injection effects.    Pt and daughter agreeable,.    Josie PINON RN Care Coordinator  [facility]

## 2023-03-07 NOTE — TELEPHONE ENCOUNTER
This concern is open in separate encounter. Closing to prevent duplication    Josie PINON RN Care Coordinator  [facility]

## 2023-03-08 NOTE — TELEPHONE ENCOUNTER
Will keep encounter open at this time for pt communication and nursing response.    Racheal PINON RN Care Coordinator  [facility]        Follow up  3/7/2023 11:18 PM Reply    To: PAIN NURSE      From: Lucie De La Rosa on behalf of Kurt De La Rosa      Created: 3/7/2023 11:18 PM        *-*-*This message was handled on 3/8/2023 10:30 AM by RACHEAL PALM*-*-*    This message is being sent by Lucie De La Rosa on behalf of Leonardo De La Rosa.     Efrem Galindo/,     Please note that things got worse last night and we ended up having to take my Mom to the ER last night due to the fact that she was still throwing up and not able to urinate for about 7 hours. Please refer to the notes from [facility] in [facility] to see the attending physicians notes. We are not continuing this medication as it is not working for her. Please advise. I know you are having a virtual with her tomorrow but just wanted to know your thoughts as this was a scary experience and I won t be there for the virtual.     Thank you,  Lucie         ----------------      Follow up  3/8/2023 10:30 AM     To: Kurt De La Rosa      From: Racheal Palm RN      Created: 3/8/2023 10:30 AM        Dr Zia Varela was able to look at the ER notes and review your message. She is aware you are holding the medication until a discussion tomorrow. We advise for the moment to continue her supportive care, rest,  and fluids.  Dr Lizarraga can discuss a further plan Thursday afternoon.      Thanks much,  Racheal PINON RN Care Coordinator  [facility]

## 2023-03-08 NOTE — ED PROVIDER NOTES
EMERGENCY DEPARTMENT ENCOUNTER      NAME: Leonardo De La Rosa  AGE: 77 year old female  YOB: 1945  MRN: [id number]  EVALUATION DATE & TIME: No admission date for patient encounter.    PCP: Cassie Ortega    ED PROVIDER: Lena Simental PA-C      Chief Complaint   Patient presents with     Urinary Retention         FINAL IMPRESSION:  1. Vomiting    2. Urinary retention        MEDICAL DECISION MAKING:    Pertinent Labs & Imaging studies reviewed. (See chart for details)  77 year old female presents to the Emergency Department for evaluation of urinary retention, abdominal pain and vomiting.  Today around 11 AM after taking half a tablet of her buprenorphine patient started to have urinary retention, abdominal pain and vomiting.  Her symptoms continued to worsen throughout the afternoon she was not able to keep anything down and family was concerned so they presented to the emergency department.  On my evaluation, patient was hypertensive at 187/91 but otherwise vitally stable.  Examination with abdomen that was soft, nondistended with mild tenderness to palpation over the suprapubic region without any rebound or guarding.  Heart was in regular rate and rhythm and lungs were clear to auscultation bilaterally.  Differential diagnosis included, but is not limited to, withdrawal, bowel obstruction, intra-abdominal infection, medication side effect.    Initial bladder scan with over 800 mL of urine in the bladder.  Bermeo catheter was ordered and placed with good urine output.  UA was sent and does not show any signs of infection.  After Bermeo catheter was placed, patient's abdominal pain significantly decreased and she was no longer having any tenderness to palpation on exam.  I did obtain basic labs including CBC, BMP, lipase and LFTs.  CBC without any derangement.  Lipase normal at 50.  LFTs unremarkable.  BMP with sodium of 126 and chloride was low at 90, with the significant amount of vomiting she was  having I did feel this is likely caused by dehydration and gave her a liter of fluids.  We did discussed imaging of the abdomen and pelvis with her symptoms however, with improvement of pain after Bermeo catheter and relatively normal labs patient and daughter declined on advanced imaging at this time.  She did not have significant improvement of her nausea with Zofran and we did give her Phenergan with complete resolution of her nausea.  She was also given a dose of Dilaudid for pain.  She was able to eat and drink here in the emergency department without difficulties and was requesting to be discharged home.  With complete resolution of her symptom, reassuring labs I do feel comfortable discharging the patient home to follow-up as an outpatient.  She does have have an appointment with her primary care provider tomorrow and I did instruct her to keep this appointment.  At this time, I discussed not giving the buprenorphine as she had significant side effects resulting in her emergency department visit.  We did discuss strict return precautions including further uncontrolled vomiting, significant worsening abdominal pain and if this occurs I do feel that she would require advanced imaging of the abdomen and pelvis.  As she had significant improvement with the Phenergan I will discharge her home with this medication to help with her nausea, recommended plenty of fluids and rest.  I discussed all results and plan of care with the patient and daughter and they were in agreement and understanding.  All questions were to the best my ability and she was discharged from the emergency department in stable condition to follow-up with primary care and pain clinic.    ED COURSE:  7:02 PM I met with the patient, obtained history, performed an initial exam, and discussed options and plan for diagnostics and treatment here in the ED.    7:15 PM I staffed the patient with Bere Chase MD.    7:20 PM with pharmacy about  buprenorphine and side effects and possible withdrawal with her history of taking hydrocodone as well.  At this time, pharmacy does not feel patient is likely in withdrawal and recommended symptomatic treatment.  She does believe that her symptoms could be likely due to side effects of buprenorphine.    10:06 PM Patient discharged after being provided with extensive anticipatory guidance and given return precautions, importance of PCP follow-up emphasized.    At the conclusion of the encounter I discussed the results of all of the tests and the disposition. The questions were answered. The patient or family acknowledged understanding and was agreeable with the care plan.     MEDICATIONS GIVEN IN THE EMERGENCY:  Medications   ondansetron (ZOFRAN) injection 4 mg (4 mg Intravenous $Given 3/7/23 2008)   0.9% sodium chloride BOLUS (0 mLs Intravenous Stopped 3/7/23 2208)   promethazine (PHENERGAN) 25 mg in sodium chloride 0.9 % 50 mL intermittent infusion (25 mg Intravenous $Given 3/7/23 2114)   HYDROmorphone (PF) (DILAUDID) injection 0.5 mg (0.5 mg Intravenous $Given 3/7/23 2124)       NEW PRESCRIPTIONS STARTED AT TODAY'S ER VISIT  New Prescriptions    PROMETHAZINE (PHENERGAN) 25 MG TABLET    Take 1 tablet (25 mg) by mouth every 6 hours as needed for nausea            =================================================================    HPI:    Patient information was obtained from: The patient and her daughter    Use of Interpretor: N/A         Leonardo De La Rosa is a 77 year old female with a pertinent history of GERD, alzheimer's disease, hyperlipidemia, osteoporosis who presents to this ED with her daughter for evaluation of vomiting, urinary retention and abdominal pain.  The patient was recently started on buprenorphine for chronic neck and headaches after tapering down and stopping her Dilaudid approximately 6 days ago.  She was feeling at her baseline this morning and took a half a tablet of buprenorphine at 11 AM.   At approximately 3 PM she was having significant urinary retention, abdominal pain and ended up vomiting after drinking Gatorade.  Since then, she had had worsening symptoms, inability to urinate and continued vomiting and family was concerned and presented to the emergency department.  On my evaluation, the patient denies any fevers, chills, chest pain, shortness of breath.  She does state that her neck hurts but mostly her throat from vomiting so much.  She reports that she urinated normally this morning at approximately 1045 prior to taking the medications.  She does have a history of adverse reactions to multiple pain medications including nausea and vomiting, but has never had symptoms of urinary retention.  Prior to the urinary retention, she did not have any urinary symptoms including burning with urination, blood in her urine or urinary frequency.  He had normal bowel movements this morning and has not been complaining of any constipation or diarrhea.  She does not voice any other complaints.      REVIEW OF SYSTEMS:  Negative unless otherwise stated in the above HPI.      PAST MEDICAL HISTORY:  Past Medical History:   Diagnosis Date     Arthritis     Feet Hands and back.     PONV (postoperative nausea and vomiting)     Severe     Postsurgical arthrodesis status 4/23/2013     Status post lumbar spinal fusion 2/24/2020       PAST SURGICAL HISTORY:  Past Surgical History:   Procedure Laterality Date     ARTHRODESIS FOOT  4/22/2013    Procedure: ARTHRODESIS FOOT;  Left Second and Third Tarsal metatarsal Arthrodesis, Bunion Correction, Lapidus Procedure, Weil Osteotomy   ;  Surgeon: Alber Pace MD;  Location: [facility] OR     ARTHROPLASTY KNEE Right 1/18/2022    Procedure: RIGHT TOTAL KNEE ARTHROPLASTY;  Surgeon: Yves Li MD;  Location: [facility] OR     BUNIONECTOMY LAPIDUS WITH TARSAL METATARSAL (TMT) FUSION  4/22/2013    Procedure: BUNIONECTOMY LAPIDUS WITH TARSAL METATARSAL (TMT) FUSION;;  Surgeon:  Alber Pace MD;  Location: [facility] OR     FOOT SURGERY      right bunionectomy.     FOOT SURGERY       HYSTERECTOMY       HYSTERECTOMY TOTAL ABDOMINAL, BILATERAL SALPINGO-OOPHORECTOMY, COMBINED       KNEE SURGERY       ZZC TOTAL KNEE ARTHROPLASTY      left           CURRENT MEDICATIONS:    No current facility-administered medications for this encounter.    Current Outpatient Medications:      promethazine (PHENERGAN) 25 MG tablet, Take 1 tablet (25 mg) by mouth every 6 hours as needed for nausea, Disp: 10 tablet, Rfl: 0     acetaminophen (TYLENOL) 325 MG tablet, Take 2 tablets (650 mg) by mouth every 4 hours as needed for other (mild pain), Disp: 100 tablet, Rfl: 0     ammonium lactate (AMLACTIN) 12 % external cream, Apply topically daily as needed for dry skin, Disp: , Rfl:      azelastine (ASTELIN) 0.1 % nasal spray, Spray 1 spray into both nostrils every morning, Disp: , Rfl:      buprenorphine (SUBUTEX) 2 MG SUBL sublingual tablet, Place 0.5 tablets (1 mg) under the tongue 2 times daily, Disp: 30 tablet, Rfl: 0     Buprenorphine HCl (BELBUCA) 75 MCG FILM buccal film, Place 1 Film (75 mcg) inside cheek every 12 hours, Disp: 60 Film, Rfl: 0     calcium carbonate-vitamin D 600-200 MG-UNIT TABS, Take 1 tablet by mouth daily., Disp: , Rfl:      donepezil (ARICEPT) 10 MG tablet, Take 1 tablet (10 mg) by mouth At Bedtime, Disp: 90 tablet, Rfl: 3     DULoxetine (CYMBALTA) 60 MG capsule, Take 60 mg by mouth daily , Disp: , Rfl:      famotidine (PEPCID) 40 MG tablet, Take 20 mg by mouth every evening , Disp: , Rfl:      ferrous sulfate (FEROSUL) 325 (65 Fe) MG tablet, Take 325 mg by mouth every morning, Disp: , Rfl:      fluticasone (FLONASE) 50 MCG/ACT nasal spray, INSTILL 1 SPRAY INTO EACH NOSTRIL ONCE DAILY AS NEEDED FOR RHINITIS, Disp: , Rfl:      HYDROmorphone (DILAUDID) 2 MG tablet, One po q 4-6 hours prn, do not take at the same time as methadone Use dates:11/11/22-12/10/22, Disp: , Rfl:      levocetirizine  "(XYZAL) 5 MG tablet, Take 5 mg by mouth every morning (Patient not taking: Reported on 2/27/2023), Disp: , Rfl:      losartan (COZAAR) 100 MG tablet, Take 100 mg by mouth daily , Disp: , Rfl:      methocarbamol (ROBAXIN) 500 MG tablet, TAKE 1 TABLET BY MOUTH 3 TIMES A DAY, Disp: , Rfl:      multivitamin w/minerals (THERA-VIT-M) tablet, Take 1 tablet by mouth daily., Disp: , Rfl:      omeprazole (PRILOSEC) 20 MG DR capsule, Take 20 mg by mouth every morning , Disp: , Rfl:      pravastatin (PRAVACHOL) 20 MG tablet, Take 20 mg by mouth daily , Disp: , Rfl:      pregabalin (LYRICA) 75 MG capsule, Take 75 mg by mouth 2 times daily Patient taking 50 mg in am and 75 mg in pm, Disp: , Rfl:      senna-docusate (SENOKOT-S/PERICOLACE) 8.6-50 MG tablet, Take 1-2 tablets by mouth 2 times daily Take while on oral narcotics to prevent or treat constipation., Disp: 30 tablet, Rfl: 0     Vitamin D3 (CHOLECALCIFEROL) 25 mcg (1000 units) tablet, Take 1,000 Units by mouth daily, Disp: , Rfl:       ALLERGIES:  Allergies   Allergen Reactions     Estratest H.S. Other (See Comments) and Unknown     Other reaction(s): Other (see comments)  Increase lipids  Outside source did not list reaction  Increase lipids       Hydrocodone      Oxycodone      Propofol Nausea and Vomiting     Outside source states \"injectable and inhaled\"  Outside source states \"injectable and inhaled\"  Outside source states \"injectable and inhaled\"  Outside source states \"injectable and inhaled\"       Reglan      Estrogens Rash     Other reaction(s): Other (see comments)  Outside source did not list reaction  Estrogen patches, from patch only, ( Adhesive )  does tolerate estrogen other forms        Penicillins Nausea and Vomiting and Nausea     Other reaction(s): GI intolerance, GI Upset  Patient states upset stomach.  Patient states upset stomach.  Patient states upset stomach.  Patient states upset stomach.         FAMILY HISTORY:  Family History   Problem Relation " Age of Onset     Coronary Artery Disease Mother        SOCIAL HISTORY:   Social History     Socioeconomic History     Marital status:    Tobacco Use     Smoking status: Never     Smokeless tobacco: Never   Substance and Sexual Activity     Alcohol use: No     Drug use: No       VITALS:  Patient Vitals for the past 24 hrs:   BP Temp Temp src Pulse Resp SpO2   03/07/23 2200 (!) 194/90 -- -- 88 -- 97 %   03/07/23 2145 -- -- -- 83 -- 95 %   03/07/23 2130 (!) 140/90 -- -- 84 -- 95 %   03/07/23 1843 (!) 187/91 97.1  F (36.2  C) Oral 96 16 100 %       PHYSICAL EXAM    Constitutional: Well developed, Well nourished, NAD  HENT: Normocephalic, Atraumatic, Bilateral external ears normal, mask in place.   Neck: Normal range of motion, No tenderness, Supple, No stridor.  Eyes: Eyes track normally throughout exam, Conjunctiva normal, No discharge.   Respiratory: Normal breath sounds, No respiratory distress, No wheezing, Speaks full sentences easily. No cough.  Cardiovascular: Normal heart rate, Regular rhythm, No murmurs, No rubs, No gallops. Chest wall nontender.  GI: Soft, mild tenderness to palpation in the suprapubic region, No masses, No flank tenderness. No rebound or guarding.  Musculoskeletal: No edema. No cyanosis, No clubbing. Good range of motion in all major joints. No tenderness to palpation or major deformities noted. No tenderness of the CTLS spine.   Integument: Warm, Dry, No erythema, No rash. No petechiae.  Neurologic: Alert & oriented x 3, Normal motor function, Normal sensory function, No focal deficits noted. Normal gait.  Psychiatric: Affect normal, Judgment normal, Mood normal. Cooperative.    LAB:  All pertinent labs reviewed and interpreted.  Recent Results (from the past 24 hour(s))   UA with Microscopic reflex to Culture    Collection Time: 03/07/23  7:47 PM    Specimen: Urine, Bermeo Catheter   Result Value Ref Range    Color Urine Colorless Colorless, Straw, Light Yellow, Yellow    Appearance  Urine Clear Clear    Glucose Urine Negative Negative mg/dL    Bilirubin Urine Negative Negative    Ketones Urine Negative Negative mg/dL    Specific Gravity Urine 1.011 1.001 - 1.030    Blood Urine Negative Negative    pH Urine 7.0 5.0 - 7.0    Protein Albumin Urine 70 (A) Negative mg/dL    Urobilinogen Urine <2.0 <2.0 mg/dL    Nitrite Urine Negative Negative    Leukocyte Esterase Urine Negative Negative    RBC Urine 1 <=2 /HPF    WBC Urine <1 <=5 /HPF    Squamous Epithelials Urine <1 <=1 /HPF   CBC (+ platelets, no diff)    Collection Time: 03/07/23  8:07 PM   Result Value Ref Range    WBC Count 8.6 4.0 - 11.0 10e3/uL    RBC Count 4.68 3.80 - 5.20 10e6/uL    Hemoglobin 13.9 11.7 - 15.7 g/dL    Hematocrit 42.0 35.0 - 47.0 %    MCV 90 78 - 100 fL    MCH 29.7 26.5 - 33.0 pg    MCHC 33.1 31.5 - 36.5 g/dL    RDW 13.0 10.0 - 15.0 %    Platelet Count 319 150 - 450 10e3/uL   Basic metabolic panel    Collection Time: 03/07/23  8:07 PM   Result Value Ref Range    Sodium 126 (L) 136 - 145 mmol/L    Potassium 3.9 3.4 - 5.3 mmol/L    Chloride 90 (L) 98 - 107 mmol/L    Carbon Dioxide (CO2) 24 22 - 29 mmol/L    Anion Gap 12 7 - 15 mmol/L    Urea Nitrogen 11.7 8.0 - 23.0 mg/dL    Creatinine 0.54 0.51 - 0.95 mg/dL    Calcium 9.8 8.8 - 10.2 mg/dL    Glucose 116 (H) 70 - 99 mg/dL    GFR Estimate >90 >60 mL/min/1.73m2   Lipase    Collection Time: 03/07/23  8:07 PM   Result Value Ref Range    Lipase 50 13 - 60 U/L   Hepatic function panel    Collection Time: 03/07/23  8:07 PM   Result Value Ref Range    Protein Total 8.3 6.4 - 8.3 g/dL    Albumin 4.9 3.5 - 5.2 g/dL    Bilirubin Total 0.4 <=1.2 mg/dL    Alkaline Phosphatase 86 35 - 104 U/L    AST 25 10 - 35 U/L    ALT 22 10 - 35 U/L    Bilirubin Direct <0.20 0.00 - 0.30 mg/dL         RADIOLOGY:  Reviewed all pertinent imaging. Please see official radiology report.  No orders to display       Lena Simental PA-C  Emergency Medicine  [facility]  3/7/2023      Lena Simental,  CHARBEL  03/08/23 0009

## 2023-03-08 NOTE — ED PROVIDER NOTES
I, Bere Chase MD have reviewed the documentation, personally taken the patient's history, performed an exam and agree with the physical finds, diagnosis and management plan.  I saw this patient with Lena Simental PA-C.    ED Course as of 03/07/23 2155   Tue Mar 07, 2023   1915 I discussed the case with Lena Simental PA-C.   1925 I consulted with the pharmacist about the patient because she was recently switched to Subutex which does not have naltrexone in it but I wondering if it could have put her into withdrawal with this vomiting.  I suspect the urinary retention is just a side effect of the Subutex.  The pharmacist did a little research and came back and talk to me and it can put you into withdrawal.  Typically would just give a higher dose of Subutex but since I think she is having other side effects related to it we may just give her some Dilaudid and see if that helps.   2017 Patient's urine does not show any infection.   2049 Patient is a 77-year-old female who comes in today for evaluation of nausea and vomiting and abdominal pain.  She had some urinary retention and a Bermeo catheter has been placed.  Clear yellow urine has drained and she has had over a liter out since she has been here.  She is feeling a lot better.  Her abdomen is soft and nontender.  She has no renal failure.  She is still little bit nauseated and would like to try something else for nausea some and order some Phenergan for her.  Sodium is little low at 126 and so I did order some IV fluids for her.  I think once the fluids and Phenergan are and she can work on getting discharged home.  Her and her daughter are both in agreement with that plan and they are comfortable with that.   2123 Patient is asking for some Dilaudid now.  I think that is fine.  We will get it ordered.       I personally saw the patient and performed a substantive portion of the visit including all aspects of the medical decision making.       Rena  Bere Farias MD  03/07/23 3599

## 2023-03-08 NOTE — ED NOTES
"Patient discharged from ED. Discharge paperwork discussed, no questions at this time and patient agreeable about discharge. AVS and prescriptions in hand. Refused to take night bag, only wanted leg bag as night bag was a \"tripping hazard\"  "

## 2023-03-08 NOTE — ED TRIAGE NOTES
Pt arrived via triage. Pt reports taking a new medication today at 11 am and then about noon started vomiting. Pt has also not voided since 11am today and is experiencing abd pain.     Triage Assessment     Row Name 03/07/23 1579       Triage Assessment (Adult)    Airway WDL WDL       Respiratory WDL    Respiratory WDL WDL       Skin Circulation/Temperature WDL    Skin Circulation/Temperature WDL WDL       Cardiac WDL    Cardiac WDL WDL       Peripheral/Neurovascular WDL    Peripheral Neurovascular WDL WDL       Cognitive/Neuro/Behavioral WDL    Cognitive/Neuro/Behavioral WDL WDL

## 2023-03-08 NOTE — DISCHARGE INSTRUCTIONS
You were seen and evaluated here in the emergency department for your urinary retention and vomiting.  Labs are fortunately reassuring and your symptoms improved after Bermeo catheter and antinausea medications here.  I will discharge you home with some Phenergan to take as needed for nausea.  You have follow-up with your primary care provider tomorrow and I recommend discussing with them pain management for your chronic neck pain as clearly the buprenorphine was not agreeable with you.    We will send you home with a leg bag for your Bermeo catheter and recommend calling urology tomorrow to get follow-up within the next 3 to 5 days.    Return with worsening abdominal pain, fevers, uncontrolled vomiting or any other concerning symptoms.

## 2023-03-09 NOTE — PROGRESS NOTES
Kurt is a 77 year old who is being evaluated via a billable video visit.    Is Pt currently in [place]? Yes    NOTE:  If Pt is not in [place], Appointment needs to be canceled and rescheduled.    How would you like to obtain your AVS? [place]  If the video visit is dropped, the invitation should be resent by: Text to cell phone: [phone]  Will anyone else be joining your video visit? No       DELIA [facility]   Pain Management Center    Video-Visit Details  Type of service:  Video Visit   Video Start Time: 2:01 PM  Video End Time:2:33 PM  Originating Location (pt. Location): Home    Distant Location (provider location):  On-site  Platform used for Video Visit: [facility]      Date of visit: 3/9/2023    Chief complaint:   Chief Complaint   Patient presents with     Pain       Interval history:  Leonardo De La Rosa is a 77 year old female last seen by me for INITIAL CONSULT on 2/27/2023. Today is her first FOLLOW UP.       Since her last visit, Leonardo De La Rosa reports:    - The patient continues to have right-sided neck pain that radiates to her ear and occasionally causes headaches on that side.  The pain is severe and began about a year and a half ago.  - The patient is present for today's video visit by herself.  Unfortunately partway through the video visit the connection became bad and we switched to a telephone visit.  - She was excited about the possibility that buprenorphine could be helpful for her pain.  After taking it she had an awful experience.  - She took Subutex 1mg or half a tablet at 11am on March 7.  About an hour later she started vomiting and discontinued for the next 8 hours.  She could not hold down any fluids and had not been able to produce urine so her daughter took her to the emergency room.  They gave her Dilaudid in the emergency room and her nausea and vomiting resolved.    - She denies any other symptoms  "like a rash or the feeling that her throat was closing or itching.  - She is committed to never taking any another medication in the opiate class.  It was an awful experience for her.  - She states her prescription for Dilaudid was from the [place] pain clinic and from a physician in [place].  She states she had some left over but she is now out of medication.   - She is currently alternating Tylenol and ibuprofen throughout the day.  She takes 3 Tylenol Extra Strength throughout the day and a total of 600 mg of ibuprofen throughout the day.  This does a pretty good job of reducing her pain level.  She was previously given Celebrex by someone she cannot remember and it was told she can no longer take this medication.  Yesterday she had a ER follow-up with her primary care doctor who recommended she try Celebrex again.    - She had her injection at [facility] (RIGHT C1-2) Dr. Randhawa on 3/3/2023.  It reduced at least 30% of her pain.    - She has consulted with multiple neurosurgeons in the past and she is not a surgical candidate. \"  It is too high\"  -She has seen many different orthopedic providers, neurologists and pain providers in the past.  Most recently she went to a pain provider in [place] while out there on vacation because the pain was so severe.  At that time he performed a right-sided C2-3 radiofrequency ablation that only served to increase her pain.  - Her  passed away from liver failure about 6 months ago and this has been hard on her and the family.  She continues to grieve.  -Her daughter reported at the initial consult that there has also been some concern for ongoing dementia and she is being evaluated by neurology for this.    - Her daughter also reported at her initial consult that there has been many falls in the last 6 months.  The most severe of these falls resulted in a wrist fracture.    - At the initial consult the patient stated that she has been using Dilaudid on a daily " "basis for almost 2 years now.    She was taking Dilaudid 2 mg 4 times a day.  The patient reported that this is very helpful and she \"does not know what she would do without it \"  -She was certified for medical cannabis at her initial consult however the family has not completed this process online because they did not want to make multiple medication changes at the same time.  -The patient has a therapist that she sees on a monthly basis.  - Neurologist is currently Dr. Terry and she has an appointment coming up with [facility] neurology to get a second opinion.   - She has 3 daughters.  She lives with her oldest daughter.  One of her daughters lives in [place] and the other 2 are close by in [place].      [place]  REVIEWED TODAY: YES  SUBUTEX 2MG #30 3/6/2023  AMBIEN 5MG #30 3/3/2023  DILAUDID 2MG #100 1/26/2023  LYRICA 75MG #180 1/25/2023  METHADONE 5MG #30 11/11/2022    UDS and CONTRACT  - not done    MEDICATIONS FOR PAIN:   TYLENOL 500MG TID PRN  IBUPROFEN 200MG TID PRN  CYMBALTA 60MG DAILY  LYRICA 75mg BID  CELEBREX 200MG BID    Topicals - diclofenac (Voltaren gel), lidocaine (Lidoderm) and CBD    INJECTIONS/SURGERY:  RIGHT C1-2 facet joint injection @ [facility] 3/3/2023 & 2/2022  BOTOX July 2022 - [facility]  trigger point injections at [facility]  Right C2-3 medial branch block to RFA  Cervical epidural steroid injection at [facility]    IMAGING:   MRI LUMBAR SPINE 5/23/2022  1.  Postoperative change of L5 through sacral fusion and laminectomy.  2.  Multilevel degenerative change.  3.  No high-grade stenoses.     CTA HEAD AND NECK 12/5/2022  HEAD CT:   1.  No CT evidence for acute intracranial process.   2.  Minor right frontal scalp contusion without associated fracture.   3.  Brain atrophy and presumed chronic microvascular ischemic changes as above.      HEAD CTA:   1.  No significant stenosis, aneurysm, or high flow vascular malformation identified.      NECK CTA:   1.  No hemodynamically " significant stenosis in the neck vessels by NASCET criteria.   2.  No evidence for dissection.     CT FACIAL BONE 12/5/2022  1.  No acute maxillofacial fracture or malalignment.   2.  Suggestion of mild right periorbital and premalar soft tissue swelling.     MRI CERVICAL SPINE 12/31/2021         CT CERVICAL SPINE 11/3/2022:        EMG RESULTS:      EMG 5/31/2022  This is a normal nerve conduction and EMG study of bilateral lower extremities     EMG 7/13/2021  1.  This is a normal electrodiagnostic study of bilateral lower extremities  2.  Specifically there is no electrodiagnostic evidence of a lumbar radiculopathy, plexopathy or peripheral apathy     Labs:   NEGATIVE rheumatology workup     Medications:  Current Outpatient Medications   Medication Sig Dispense Refill     acetaminophen (TYLENOL) 325 MG tablet Take 2 tablets (650 mg) by mouth every 4 hours as needed for other (mild pain) 100 tablet 0     ammonium lactate (AMLACTIN) 12 % external cream Apply topically daily as needed for dry skin       azelastine (ASTELIN) 0.1 % nasal spray Spray 1 spray into both nostrils every morning       buprenorphine (SUBUTEX) 2 MG SUBL sublingual tablet Place 0.5 tablets (1 mg) under the tongue 2 times daily 30 tablet 0     Buprenorphine HCl (BELBUCA) 75 MCG FILM buccal film Place 1 Film (75 mcg) inside cheek every 12 hours 60 Film 0     calcium carbonate-vitamin D 600-200 MG-UNIT TABS Take 1 tablet by mouth daily.       donepezil (ARICEPT) 10 MG tablet Take 1 tablet (10 mg) by mouth At Bedtime 90 tablet 3     DULoxetine (CYMBALTA) 60 MG capsule Take 60 mg by mouth daily        famotidine (PEPCID) 40 MG tablet Take 20 mg by mouth every evening        ferrous sulfate (FEROSUL) 325 (65 Fe) MG tablet Take 325 mg by mouth every morning       fluticasone (FLONASE) 50 MCG/ACT nasal spray INSTILL 1 SPRAY INTO EACH NOSTRIL ONCE DAILY AS NEEDED FOR RHINITIS       HYDROmorphone (DILAUDID) 2 MG tablet One po q 4-6 hours prn, do not take  at the same time as methadone Use dates:11/11/22-12/10/22       levocetirizine (XYZAL) 5 MG tablet Take 5 mg by mouth every morning (Patient not taking: Reported on 2/27/2023)       losartan (COZAAR) 100 MG tablet Take 100 mg by mouth daily        methocarbamol (ROBAXIN) 500 MG tablet TAKE 1 TABLET BY MOUTH 3 TIMES A DAY       multivitamin w/minerals (THERA-VIT-M) tablet Take 1 tablet by mouth daily.       omeprazole (PRILOSEC) 20 MG DR capsule Take 20 mg by mouth every morning        pravastatin (PRAVACHOL) 20 MG tablet Take 20 mg by mouth daily        pregabalin (LYRICA) 75 MG capsule Take 75 mg by mouth 2 times daily Patient taking 50 mg in am and 75 mg in pm       promethazine (PHENERGAN) 25 MG tablet Take 1 tablet (25 mg) by mouth every 6 hours as needed for nausea 10 tablet 0     senna-docusate (SENOKOT-S/PERICOLACE) 8.6-50 MG tablet Take 1-2 tablets by mouth 2 times daily Take while on oral narcotics to prevent or treat constipation. 30 tablet 0     Vitamin D3 (CHOLECALCIFEROL) 25 mcg (1000 units) tablet Take 1,000 Units by mouth daily           Physical Exam:  not currently breastfeeding.    GENERAL: Healthy, alert and no distress  EYES: Eyes grossly normal to inspection.  No discharge or erythema, or obvious scleral/conjunctival abnormalities.  RESP: No audible wheeze, cough, or visible cyanosis.  No visible retractions or increased work of breathing.    SKIN: Visible skin clear. No significant rash, abnormal pigmentation or lesions.  NEURO: Cranial nerves grossly intact.  Mentation and speech appropriate for age.  PSYCH: Mentation appears normal, affect normal/bright, judgement and insight intact, normal speech and appearance well-groomed.       ASSESSMENT/PLAN:   Leonardo De La Rosa is a 77 year old female has a past medical history of possible dementia, GERD, osteoporosis, and mild aortic stenosis.  She has a mental health history significant for recent loss of her spouse and is being seen at the [facility]  clinic for chronic right sided neck pain.      1. Chronic pain syndrome     The patient has a approximately 18-month history of severe right-sided neck pain that is isolated to the area just posterior to her right ear.  This has been worked up by many physicians including neurologists,  pain management and orthopedic physicians.  She does have a area of severe facet joint arthritis at C1-2 on the right side on her cervical MRI that correlates with this.      Mental Health - the patient's mental health concerns, specifically her grief and depression surrounding her 's death 6 months ago, affect her experience of pain and contribute to her clinically significant distress.     Could consider pain psychology in the future.  A clinical Health Pain Psychologist can help reduce physical and psychosocial triggers or reinforcers of pain by adapting thoughts, feelings and behaviors to reduce symptoms and increase quality of life.  Evidence indicates that the practice of relaxation, meditation, and mindfulness techniques can significantly affect pain levels and overall well being.     She has been taking Dilaudid 2 mg 4 times a day for a couple years now.  This was most recently being prescribed by her primary care physician.  We spent a great deal of time at her initial consult discussing the use of chronic opioids for chronic pain and why I do not think they are indicated.  Specifically we talked about the development of tolerance over time, opioid induced hyperalgesia, sedation and cognitive impairment, sleep disordered breathing and risk of unintentional overdose and death.    I recommended a medication called buprenorphine which is in the opioid class but does not cause tolerance and does not result in respiratory depression.  I wrote a prescription for Belbuca and this was not covered by her insurance.  I then wrote a prescription for Subutex which the patient tried and unfortunately had an adverse reaction to.  I  am not recommending she restart Dilaudid and if we are to consider medication management in the future I would recommend a trial of the Butrans patch as a next step.  That is buprenorphine but in a much lower dosage with a slow absorption.  I am not willing to continue prescribing Dilaudid ongoing.  I did tell the patient that today during the appointment.    - The patient has discontinued use of Robaxin and has not noticed much of a difference in her pain.  I do not generally recommend muscle relaxants for patients over the age of 60 and this could be contributing to her falls.  In addition I do not believe her pain has a myofascial etiology and this medication therefore should not be useful for her pain.     -Medical cannabis certification was done after the patient's initial consult.  The family was instructed that they would need to pay the yearly fee and complete the forms that are emailed to them.  They are familiar with this as they have done in the past.  They have not done this yet as they were waiting for the buprenorphine medication trial first.  We could consider a trial of medical marijuana at this time.    She should continue her Cymbalta 60 mg daily and the Lyrica 75 mg twice daily.     2. Other spondylosis, cervical region  Previous injections including Botox injections, trigger point injections, cervical epidurals and an RFA have not been helpful.  The only injection that was somewhat helpful was a right C1/2 facet joint injection that was done at [facility] about a year ago.  She had this repeated 6 days ago and so far it is providing pretty good pain relief.  She is reporting a reduction of about 30%.  We typically expect maximum effect from injections at about 2 weeks out.  I am hopeful that she will continue to get further reductions in her pain.  We can repeat this injection ongoing as needed every 3 months.    We discussed use of Celebrex for pain.  Apparently a doctor at some point had told her  not to take this on a daily basis.  I am okay with her using Celebrex twice daily ongoing for her arthritis pain.  I refilled a prescription for Celebrex 200 mg tablets today.    - celecoxib (CELEBREX) 200 MG capsule; Take 1 capsule (200 mg) by mouth 2 times daily  Dispense: 60 capsule; Refill: 1      3. Allergic reaction, subsequent encounter  I received a [employer] message from the patient's daughter with a photo of an after visit summary from another clinic stating she had an allergy to buprenorphine.  I reviewed the [employer] system and an allergy for buprenorphine is not noted I also searched on care everywhere and there was no mention of a buprenorphine allergy.  I briefly discussed with the patient today the difference between an adverse reaction to a medication and an allergic reaction.  With an allergic reaction you get hives, itching and occasionally closing of the throat.  What she experienced with nausea and vomiting was an adverse reaction and not an allergy.  It would be incredibly rare for someone to have an allergy to buprenorphine.  There have only been a couple case reports in the 20 years that this medication has been out there of confirmed allergic reactions to this medication.  I do not believe that the patient has an allergy to buprenorphine and I will not be adding this to her allergy list.  If the family insists we could send her to an allergy specialist to be tested for this specifically.    The patient's reaction could have been precipitated withdrawal.  Her symptoms match what we would anticipate in a patient having precipitated withdrawal.  In addition her symptoms were relieved by addition of more Dilaudid in the ER.  However, the patient adamantly denies having had taken Dilaudid in the 12 hours prior to starting Subutex.        MEDICATIONS:   Orders Placed This Encounter   Medications     celecoxib (CELEBREX) 200 MG capsule     Sig: Take 1 capsule (200 mg) by mouth 2 times daily      Dispense:  60 capsule     Refill:  1       FOLLOW UP: The patient has a follow-up scheduled for March 21.  This will be a video visit.        BILLING TIME DOCUMENTATION:   The total TIME spent on this patient on the date of the encounter/appointment was 68 minutes.      TOTAL TIME includes:   Time spent preparing to see the patient (reviewing records and tests) - 12 min  Time spent face to face (or over the phone) with the patient - 32 min  Time spent ordering tests, medications, procedures and referrals - 2 min  Time spent Referring and communicating with other healthcare professionals - 0 min  Time spent documenting clinical information in Epic - 22 min      SALLY CROWLEY MD   Pain Management

## 2023-03-09 NOTE — PATIENT INSTRUCTIONS
Recommend a trial of Celebrex 200 mg twice daily.  You can use this in addition to the Tylenol you are already taking 500 mg 3 times a day.  We discussed that I do not recommend long-term use of traditional opioids like Dilaudid for treatment of long-term pain.  We discussed the difference between an allergic reaction and an adverse reaction and I believe what you had was an adverse reaction and not an allergy.  We could consider trial of medical cannabis as a next up.  We could also consider trial of a Butrans patch which is another formulation of buprenorphine however it is very slowly absorbed and really low dose.  We have a follow-up scheduled for March 21, 2023.  Celina Lizarraga MD     ----------------------------------------------------------------  Clinic Number:  [phone]   Call with any questions about your care and for scheduling assistance.   Calls are returned Monday through Friday between 8 AM and 4:30 PM. We usually get back to you within 2 business days depending on the issue/request.    If we are prescribing your medications:  For opioid medication refills, call the clinic or send a [employer] message 7 days in advance.  Please include:  Name of requested medication  Name of the pharmacy.  For non-opioid medications, call your pharmacy directly to request a refill. Please allow 3-4 days to be processed.   Per [place] State Law:  All controlled substance prescriptions must be filled within 30 days of being written.    For those controlled substances allowing refills, pickup must occur within 30 days of last fill.      We believe regular attendance is key to your success in our program!    Any time you are unable to keep your appointment we ask that you call us at least 24 hours in advance to cancel.This will allow us to offer the appointment time to another patient.   Multiple missed appointments may lead to dismissal from the clinic.

## 2023-03-10 NOTE — TELEPHONE ENCOUNTER
These conceres were addressed in OV 3/9     Closing    Josie PINON RN Care Coordinator  [facility]

## 2023-03-16 NOTE — TELEPHONE ENCOUNTER
Will leave encounter open for patient response/chart review by nursing.     Below sent to pt  Hello,     The video link will be sent to Kurt and once connected separate a call will be made to Lucie. We are unable to video link both at the same time.    Cassia BORJAS, RN Care Coordinator  [facility]  Pain [facility]

## 2023-03-20 NOTE — PROGRESS NOTES
Kurt is a 77 year old who is being evaluated via a billable video visit.      How would you like to obtain your AVS? MyChart  If the video visit is dropped, the invitation should be resent by: Other e-mail: [email or web address]  Will anyone else be joining your video visit? Yes: Lucie  . How would they like to receive their invitation? Text to cell phone: [phone]  Is Pt currently in [place]? Yes    NOTE:  If Pt is not in [place], Appointment needs to be canceled and rescheduled.    Rosemary Vega MA  [facility]        Video-Visit Details    Type of service:  Video Visit   Video Start Time: 1:31 PM  Video End Time:1:59 PM  Originating Location (pt. Location): Home    Distant Location (provider location):  On-site   Platform used for Video Visit: [facility]      Date of visit: 3/21/2023    Chief complaint:   Chief Complaint   Patient presents with     Pain       Interval history:  Leonardo De La Rosa is a 77 year old female last seen by me for INITIAL CONSULT on 2/27/2023. Her last FOLLOW UP was on 3/9/2023.       Since her last visit, Leonardo De La Rosa reports:    -The patient is present for today's video visit with her daughter, Lucie.    -Her daughter states her pain is much improved since having the injection done at [facility].  She has noticed a difference in her mother.    -Ros she is primarily concerned with a rash that she is getting when exposed to UV light.  This first started about 6 months ago after getting her gel nails done.  She has an appointment tomorrow with her primary care provider to discuss a [facility] pharmacy referral to go through her medications.    -She is currently taking Celebrex on a daily basis and has noticed that this is also help with her pain.  - Dr. Carlin Randhawa from [facility] did her injection on 3/3/2023 and she thinks he did a great job.  She states he told her she may have to come back prior to  "3 months.  She initially got only 30% pain relief from the injection but after a couple weeks her pain level has now been reduced more than 60%.  The patient is very worried that the severe pain will return.  - The patients pain is right-sided neck pain that radiates to her ear and occasionally causes headaches on that side.  The pain is severe and began about a year and a half ago without any known accident or injury.  - She was excited about the possibility that buprenorphine could be helpful for her pain.  However after taking it she had an awful experience and does not wish to try this medication again. She took Subutex 1mg or half a tablet at 11am on March 7.  About an hour later she started vomiting and discontinued for the next 8 hours.  She could not hold down any fluids and had not been able to produce urine so her daughter took her to the emergency room.  They gave her Dilaudid in the emergency room and her nausea and vomiting resolved.  She denied any other symptoms like a rash or the feeling that her throat was closing or itching.  - She states her prescription for Dilaudid was from the [facility] and from a physician in [place].  She states she had some left over but she is now out of medication.   - She is currently alternating Tylenol and ibuprofen throughout the day.  She takes 3 Tylenol Extra Strength throughout the day and a total of 600 mg of ibuprofen throughout the day.  This does a pretty good job of reducing her pain level.  She was previously given Celebrex by someone she cannot remember and it was told she can no longer take this medication.  Yesterday she had a ER follow-up with her primary care doctor who recommended she try Celebrex again.    - She has consulted with multiple neurosurgeons in the past and she is not a surgical candidate. \"  It is too high\"  -She has seen many different orthopedic providers, neurologists and pain providers in the past.   - Her  passed " away from liver failure about 6 months ago and this has been hard on her and the family.  She continues to grieve.  - Her daughter reported at her initial consult that there has been many falls in the last 6 months.  The most severe of these falls resulted in a wrist fracture.    -She was certified for medical cannabis at her initial consult however the family has not completed this process online because they did not want to make multiple medication changes at the same time.  -The patient has a therapist that she sees on a monthly basis.  - Neurologist is currently Dr. Terry and she has an appointment coming up with [facility] neurology to get a second opinion.   - She has 3 daughters.  She lives with her oldest daughter.  One of her daughters lives in [place] and the other 2 are close by in [place].      [place]  REVIEWED TODAY: YES  SUBUTEX 2MG #30 3/6/2023  AMBIEN 5MG #30 3/3/2023  DILAUDID 2MG #100 1/26/2023  LYRICA 75MG #180 1/25/2023  METHADONE 5MG #30 11/11/2022    UDS and CONTRACT DONE on - not done    MEDICATIONS FOR PAIN:   TYLENOL 500MG TID PRN  IBUPROFEN 200MG TID PRN  CYMBALTA 60MG DAILY  LYRICA 75mg BID  CELEBREX 200MG BID    Topicals - diclofenac (Voltaren gel), lidocaine (Lidoderm) and CBD    INJECTIONS/SURGERY:  RIGHT C1-2 facet joint injection @ [facility] 3/3/2023 & 2/2022  BOTOX July 2022 - [place]  trigger point injections at [facility]  Right C2-3 medial branch block to RFA - made pain worse  Cervical epidural steroid injection at [facility]    IMAGING:   MRI LUMBAR SPINE 5/23/2022  1.  Postoperative change of L5 through sacral fusion and laminectomy.  2.  Multilevel degenerative change.  3.  No high-grade stenoses.     CTA HEAD AND NECK 12/5/2022  HEAD CT:   1.  No CT evidence for acute intracranial process.   2.  Minor right frontal scalp contusion without associated fracture.   3.  Brain atrophy and presumed chronic microvascular ischemic changes as above.      HEAD CTA:   1.  No  significant stenosis, aneurysm, or high flow vascular malformation identified.      NECK CTA:   1.  No hemodynamically significant stenosis in the neck vessels by NASCET criteria.   2.  No evidence for dissection.     CT FACIAL BONE 12/5/2022  1.  No acute maxillofacial fracture or malalignment.   2.  Suggestion of mild right periorbital and premalar soft tissue swelling.     MRI CERVICAL SPINE 12/31/2021         CT CERVICAL SPINE 11/3/2022:      EMG RESULTS:    EMG 5/31/2022  This is a normal nerve conduction and EMG study of bilateral lower extremities     EMG 7/13/2021  1.  This is a normal electrodiagnostic study of bilateral lower extremities  2.  Specifically there is no electrodiagnostic evidence of a lumbar radiculopathy, plexopathy or peripheral apathy     Labs:   NEGATIVE rheumatology workup     Medications:  Current Outpatient Medications   Medication Sig Dispense Refill     acetaminophen (TYLENOL) 325 MG tablet Take 2 tablets (650 mg) by mouth every 4 hours as needed for other (mild pain) 100 tablet 0     ammonium lactate (AMLACTIN) 12 % external cream Apply topically daily as needed for dry skin       azelastine (ASTELIN) 0.1 % nasal spray Spray 1 spray into both nostrils every morning       calcium carbonate-vitamin D 600-200 MG-UNIT TABS Take 1 tablet by mouth daily.       celecoxib (CELEBREX) 200 MG capsule Take 1 capsule (200 mg) by mouth 2 times daily 60 capsule 1     donepezil (ARICEPT) 10 MG tablet Take 1 tablet (10 mg) by mouth At Bedtime 90 tablet 3     DULoxetine (CYMBALTA) 60 MG capsule Take 60 mg by mouth daily        famotidine (PEPCID) 40 MG tablet Take 20 mg by mouth every evening        ferrous sulfate (FEROSUL) 325 (65 Fe) MG tablet Take 325 mg by mouth every morning       fluticasone (FLONASE) 50 MCG/ACT nasal spray INSTILL 1 SPRAY INTO EACH NOSTRIL ONCE DAILY AS NEEDED FOR RHINITIS       losartan (COZAAR) 100 MG tablet Take 100 mg by mouth daily        multivitamin w/minerals  (THERA-VIT-M) tablet Take 1 tablet by mouth daily.       omeprazole (PRILOSEC) 20 MG DR capsule Take 20 mg by mouth every morning        pravastatin (PRAVACHOL) 20 MG tablet Take 20 mg by mouth daily        pregabalin (LYRICA) 75 MG capsule Take 75 mg by mouth 2 times daily Patient taking 50 mg in am and 75 mg in pm       promethazine (PHENERGAN) 25 MG tablet Take 1 tablet (25 mg) by mouth every 6 hours as needed for nausea 10 tablet 0     senna-docusate (SENOKOT-S/PERICOLACE) 8.6-50 MG tablet Take 1-2 tablets by mouth 2 times daily Take while on oral narcotics to prevent or treat constipation. 30 tablet 0     Vitamin D3 (CHOLECALCIFEROL) 25 mcg (1000 units) tablet Take 1,000 Units by mouth daily           Physical Exam:  not currently breastfeeding.    GENERAL: Healthy, alert and no distress  EYES: Eyes grossly normal to inspection.  No discharge or erythema, or obvious scleral/conjunctival abnormalities.  RESP: No audible wheeze, cough, or visible cyanosis.  No visible retractions or increased work of breathing.    SKIN: Visible skin clear. No significant rash, abnormal pigmentation or lesions.  NEURO: Cranial nerves grossly intact.  Mentation and speech appropriate for age.  PSYCH: Mentation appears normal, affect normal/bright, judgement and insight intact, normal speech and appearance well-groomed.       ASSESSMENT/PLAN:   Leonardo De La Rosa is a 77 year old female has a past medical history of possible dementia, GERD, osteoporosis, and mild aortic stenosis.  She has a mental health history significant for recent loss of her spouse and is being seen at the pain clinic for chronic right sided neck pain.      1. Chronic pain syndrome     The patient has a approximately 18-month history of severe right-sided neck pain that is isolated to the area just posterior to her right ear.  This has been worked up by many physicians including neurologists,  pain management and orthopedic physicians.  She does have a area of  severe facet joint arthritis at C1-2 on the right side on her cervical MRI that correlates with this.      Mental Health - the patient's mental health concerns, specifically her grief and depression surrounding her 's death 6 months ago, affect her experience of pain and contribute to her clinically significant distress. Could consider pain psychology in the future.       I am not recommending she restart Dilaudid and if we are to consider medication management in the future I would recommend a trial of the Butrans patch as a next step.  That is buprenorphine but in a much lower dosage with a slow absorption.  I am not willing to continue prescribing Dilaudid ongoing.  I did tell the patient that today during the appointment.    -Medical cannabis certification was done after the patient's initial consult.  The family was instructed that they would need to pay the yearly fee and complete the forms that are emailed to them.  They are familiar with this as they have done in the past.  They have not done this yet as they were waiting for the buprenorphine medication trial first.  We could consider a trial of medical marijuana at this time.    She should continue her Cymbalta 60 mg daily and the Lyrica 75 mg twice daily. These are being prescribed by her PCP.      2. Other spondylosis, cervical region  Previous injections including Botox injections, trigger point injections, cervical epidurals and an RFA have not been helpful.  The only injection that was helpful was a right C1/2 facet joint injection that was done at [facility].  She had this repeated 3/3/2023  and so far it is providing pretty good pain relief.  She is reporting a reduction of about 75% now.  We can repeat this injection ongoing as needed every 3 months and sooner if needed after the first injection.    Surgery is not an option and ongoing injections are medically necessary.     -Continue celecoxib (CELEBREX) 200 MG capsule; Take 1 capsule (200 mg)  by mouth 2 times daily  Dispense: 60 capsule; Refill: 1    She has an appointment with her PCP Dr. Ortega on Monday and they are going to review her medications and see if one of these could be causing her UV related rash.  The patient states she looked up every medication she is on and none of them indicated it was a side effect.         MEDICATIONS:   No orders of the defined types were placed in this encounter.      FOLLOW UP:  Every 6 months - 9/5/2023      BILLING TIME DOCUMENTATION:   The total TIME spent on this patient on the date of the encounter/appointment was 48 minutes.      TOTAL TIME includes:   Time spent preparing to see the patient (reviewing records and tests) - 8 min  Time spent face to face (or over the phone) with the patient - 28 min  Time spent ordering tests, medications, procedures and referrals - 0 min  Time spent Referring and communicating with other healthcare professionals - 0 min  Time spent documenting clinical information in Epic - 12 min        SALLY CROWLEY MD   Pain Management

## 2023-03-21 NOTE — PATIENT INSTRUCTIONS
----------------------------------------------------------------  [facility] Number:  [phone]   Call with any questions about your care and for scheduling assistance.   Calls are returned Monday through Friday between 8 AM and 4:30 PM. We usually get back to you within 2 business days depending on the issue/request.    If we are prescribing your medications:  For opioid medication refills, call the clinic or send a [employer] message 7 days in advance.  Please include:  Name of requested medication  Name of the pharmacy.  For non-opioid medications, call your pharmacy directly to request a refill. Please allow 3-4 days to be processed.   Per [place] State Law:  All controlled substance prescriptions must be filled within 30 days of being written.    For those controlled substances allowing refills, pickup must occur within 30 days of last fill.      We believe regular attendance is key to your success in our program!    Any time you are unable to keep your appointment we ask that you call us at least 24 hours in advance to cancel.This will allow us to offer the appointment time to another patient.   Multiple missed appointments may lead to dismissal from the clinic.

## 2023-03-21 NOTE — NURSING NOTE
PEG Score 2/27/2023 3/21/2023   PEG Total Score 9.33 1           Rosemary Vega MA  [facility] [place]

## 2023-03-29 NOTE — TELEPHONE ENCOUNTER
[facility] Call Center    Phone Message    May a detailed message be left on voicemail: yes     Reason for Call: Ibis from [facility] is requesting that Celina Lizarraga fax an order to them for Pt to have another injection.  This would be a repeat injection.  Please fax to [phone].

## 2023-03-29 NOTE — TELEPHONE ENCOUNTER
OV note 3/31/23: Dr. Carlin Randhawa from [facility] did her injection on 3/3/2023 and she thinks he did a great job.  She states he told her she may have to come back prior to 3 months.  She initially got only 30% pain relief from the injection but after a couple weeks her pain level has now been reduced more than 60%.  The patient is very worried that the severe pain will return. We can repeat this injection ongoing as needed every 3 months and sooner if needed after the first injection.    This message is being sent by Lucie De La Rosa on behalf of Leonardo De La Rosa.     Efrem Galindo,     When speaking with  last week via the virtual visit, I did mention that  said that some patients do need a follow up injection shortly after the initial shot, and then subsequently the pattern would be the 3 months and she was ok with this. We also talked about the medical  necessity  and how the order needed to be written so it could get approved via insurance. Let me know what she says after you speak to her to make sure we are on the same page.     Thanks,  Lucie

## 2023-03-29 NOTE — TELEPHONE ENCOUNTER
Routing to MA team, please fax order singed today to # below. Thanks!    Josie PINON RN Care Coordinator  [facility]

## 2023-03-29 NOTE — TELEPHONE ENCOUNTER
Last inj March 3rd    Will review with Dr Lizarraga. Advised [facility] staff no orders will be forthcoming until MD can review with pt.    Josie PINON RN Care Coordinator  [facility]

## 2023-03-29 NOTE — TELEPHONE ENCOUNTER
Routing to provider to review and advise      You  Proxy for Kurt De La Rosa (Lucie De La Rosa) Just now (9:51 AM)     CP  Kurt and Lucie,     We normally recommend that areas are treated with injection therapy once every three months (12 weeks). We will send a request to Dr Lizarraga to review and see what she thinks.      It is possible that even if she is ok with repeating the injection again insurance may not cover it again so soon. We would advise you to check on the coverage for your mom's plan.     Thanks much,  Josie PINON RN Care Coordinator  [facility]    This [employer] message has not been read.     Lucie De La Rosa (proxy for Kurt De La Rosa)  P Pain Nurse (supporting Zia, Celina LEVIN MD) 52 minutes ago (8:59 AM)       This message is being sent by Lucie De La Rosa on behalf of Leonardo De La Rosa.     Hi ,     It seems like Mom is starting to experience pain in her neck again, so want to be proactive in scheduling another shot. Can you please send in another order to [facility] for her to see  for an injection?     Thank you,  Lucie

## 2023-03-30 NOTE — TELEPHONE ENCOUNTER
Orders faxed to number below.    Chantal Kang [facility]   Pain Management [place]

## 2023-03-30 NOTE — TELEPHONE ENCOUNTER
Hello-    The referral for injection was faxed to [facility] this afternoon. Thanks!    Cassia BORJAS, RN Care Coordinator  [facility]  Pain [facility]

## 2023-06-26 NOTE — TELEPHONE ENCOUNTER
Order placed and faxed to [facility].     Will route to nursing pool so they can let the patient know.     Celina Lizarraga MD

## 2023-06-26 NOTE — TELEPHONE ENCOUNTER
Rouing for orders if appropirate    Lucie De La Rosa (proxy for Kurt De La Rosa)  P Pain Nurse (supporting Celina Lizarraga MD) 21 hours ago (11:19 AM)       This message is being sent by Lucie De La Rosa on behalf of Leonardo De La Rosa.     Hi ,     Hope all is well. It s coming up about the 3 month jodi for my Mom s next injection. I wasn t sure if you had to send an order every time for this? Please let me know. Her last injection was on April 12th, so just wanted to get the order in and get an appt scheduled. If you do have to put an order in, can you please have them call me to schedule it?     Thanks,  Lucie PINON RN Care Coordinator  [facility]

## 2023-06-30 NOTE — TELEPHONE ENCOUNTER
Update from Lucie     You  Proxy for Kurt De La Rosa (Lucie De La Rosa) Just now (2:23 PM)     MARIA ESTHER Faulkner,     So incredibly sorry to hear the news of your mom's passing. We will relay the news to Dr Lizarraga as well.     We all wish your family peace and comfort during this time.     Take care,  Josie    This MyChart message has not been read.     Lucie De La Rosa (proxy for Kurt De La Rosa)  P Pain Nurse (supporting Celina Lizarraga MD) 17 minutes ago (2:05 PM)       This message is being sent by Lucie De La Rosa on behalf of Leonardo De La Rosa.     ,     I wanted to inform you that my Mom passed away earlier this week. Thank you for taking care of her.     Regards,  Lucie

## (undated) DEVICE — BONE CEMENT MIXEVAC III HI VAC KIT  0206-015-000

## (undated) DEVICE — PACK TOTAL KNEE SOP15TKFSD

## (undated) DEVICE — MANIFOLD NEPTUNE 4 PORT 700-20

## (undated) DEVICE — PREP CHLORAPREP 26ML TINTED ORANGE  260815

## (undated) DEVICE — BLADE SAW SAGITTAL STRK 25X90X1.37MM 4H SYS 6 6125-137-090

## (undated) DEVICE — BLADE SAW SAGITTAL STRK 18X90X1.27MM HD SYS 6 6118-127-090

## (undated) DEVICE — DRSG KERLIX FLUFFS X5

## (undated) DEVICE — SU VICRYL 0 CP-1 27" J467H

## (undated) DEVICE — WRAP EZY KNEE

## (undated) DEVICE — CAST PADDING 6" UNSTERILE 9046

## (undated) DEVICE — SUCTION TIP YANKAUER W/O VENT K86

## (undated) DEVICE — SU ETHIBOND 0 CTX CR  8X18" CX31D

## (undated) DEVICE — SOLUTION WOUND CLEANSING 3/4OZ 10% PVP EA-L3011FB-50

## (undated) DEVICE — LINEN TOWEL PACK X5 5464

## (undated) DEVICE — DRAPE SHEET REV FOLD 3/4 9349

## (undated) DEVICE — CAST PADDING 6" STERILE 9046S

## (undated) DEVICE — GLOVE PROTEXIS POWDER FREE 7.5 ORTHOPEDIC 2D73ET75

## (undated) DEVICE — DRSG XEROFORM 5X9" 8884431605

## (undated) DEVICE — DRAIN ROUND W/RESERV KIT JACKSON PRATT 10FR 400ML SU130-402D

## (undated) DEVICE — STPL SKIN 35W 6.9MM  PXW35

## (undated) DEVICE — BLADE SAW SAGITTAL STRK 18X90X1.37MM HD SYS 6 6118-137-090

## (undated) DEVICE — HOOD FLYTE W/PEELAWAY 408-800-100

## (undated) DEVICE — DRAPE STERI U 1015

## (undated) DEVICE — SUCTION IRR SYSTEM W/O TIP INTERPULSE HANDPIECE 0210-100-000

## (undated) DEVICE — ESU GROUND PAD UNIVERSAL W/O CORD

## (undated) DEVICE — BONE CLEANING TIP INTERPULSE  0210-010-000

## (undated) DEVICE — SU VICRYL 2-0 CP-1 27" UND J266H

## (undated) DEVICE — SOL NACL 0.9% IRRIG 1000ML BOTTLE 2F7124

## (undated) DEVICE — NDL SPINAL 18GA 3.5" 405184

## (undated) DEVICE — SOL WATER IRRIG 1000ML BOTTLE 2F7114

## (undated) DEVICE — GLOVE PROTEXIS POWDER FREE 7.0 ORTHOPEDIC 2D73ET70

## (undated) DEVICE — GLOVE PROTEXIS BLUE W/NEU-THERA 7.0  2D73EB70

## (undated) DEVICE — SOL NACL 0.9% IRRIG 3000ML BAG 2B7477

## (undated) DEVICE — GLOVE PROTEXIS BLUE W/NEU-THERA 8.0  2D73EB80

## (undated) DEVICE — SYR 50ML LL W/O NDL 309653

## (undated) RX ORDER — TRANEXAMIC ACID 650 MG/1
TABLET ORAL
Status: DISPENSED
Start: 2022-01-18

## (undated) RX ORDER — PROPOFOL 10 MG/ML
INJECTION, EMULSION INTRAVENOUS
Status: DISPENSED
Start: 2022-01-18

## (undated) RX ORDER — FENTANYL CITRATE 0.05 MG/ML
INJECTION, SOLUTION INTRAMUSCULAR; INTRAVENOUS
Status: DISPENSED
Start: 2022-01-18

## (undated) RX ORDER — SCOLOPAMINE TRANSDERMAL SYSTEM 1 MG/1
PATCH, EXTENDED RELEASE TRANSDERMAL
Status: DISPENSED
Start: 2022-01-18

## (undated) RX ORDER — LIDOCAINE HYDROCHLORIDE 20 MG/ML
INJECTION, SOLUTION EPIDURAL; INFILTRATION; INTRACAUDAL; PERINEURAL
Status: DISPENSED
Start: 2022-01-18

## (undated) RX ORDER — DEXAMETHASONE SODIUM PHOSPHATE 4 MG/ML
INJECTION, SOLUTION INTRA-ARTICULAR; INTRALESIONAL; INTRAMUSCULAR; INTRAVENOUS; SOFT TISSUE
Status: DISPENSED
Start: 2022-01-18

## (undated) RX ORDER — ONDANSETRON 2 MG/ML
INJECTION INTRAMUSCULAR; INTRAVENOUS
Status: DISPENSED
Start: 2022-01-18

## (undated) RX ORDER — HYDROMORPHONE HYDROCHLORIDE 2 MG/1
TABLET ORAL
Status: DISPENSED
Start: 2022-01-18

## (undated) RX ORDER — FENTANYL CITRATE 50 UG/ML
INJECTION, SOLUTION INTRAMUSCULAR; INTRAVENOUS
Status: DISPENSED
Start: 2022-01-18

## (undated) RX ORDER — HYDROMORPHONE HCL IN WATER/PF 6 MG/30 ML
PATIENT CONTROLLED ANALGESIA SYRINGE INTRAVENOUS
Status: DISPENSED
Start: 2022-01-18

## (undated) RX ORDER — CEFAZOLIN SODIUM 2 G/100ML
INJECTION, SOLUTION INTRAVENOUS
Status: DISPENSED
Start: 2022-01-18